# Patient Record
Sex: MALE | Race: WHITE | NOT HISPANIC OR LATINO
[De-identification: names, ages, dates, MRNs, and addresses within clinical notes are randomized per-mention and may not be internally consistent; named-entity substitution may affect disease eponyms.]

---

## 2017-09-18 ENCOUNTER — APPOINTMENT (OUTPATIENT)
Dept: INTERNAL MEDICINE | Facility: CLINIC | Age: 67
End: 2017-09-18

## 2017-12-09 ENCOUNTER — APPOINTMENT (OUTPATIENT)
Dept: INTERNAL MEDICINE | Facility: CLINIC | Age: 67
End: 2017-12-09
Payer: MEDICARE

## 2017-12-09 VITALS
HEART RATE: 90 BPM | HEIGHT: 72 IN | BODY MASS INDEX: 37.25 KG/M2 | SYSTOLIC BLOOD PRESSURE: 102 MMHG | RESPIRATION RATE: 18 BRPM | TEMPERATURE: 97.5 F | WEIGHT: 275 LBS | OXYGEN SATURATION: 91 % | DIASTOLIC BLOOD PRESSURE: 52 MMHG

## 2017-12-09 PROCEDURE — 99214 OFFICE O/P EST MOD 30 MIN: CPT | Mod: 25

## 2017-12-09 PROCEDURE — G0008: CPT

## 2017-12-09 PROCEDURE — 90662 IIV NO PRSV INCREASED AG IM: CPT

## 2017-12-09 PROCEDURE — 36415 COLL VENOUS BLD VENIPUNCTURE: CPT

## 2017-12-11 LAB
25(OH)D3 SERPL-MCNC: 20.1 NG/ML
ALBUMIN SERPL ELPH-MCNC: 3.7 G/DL
ALP BLD-CCNC: 128 U/L
ALT SERPL-CCNC: 19 U/L
ANION GAP SERPL CALC-SCNC: 19 MMOL/L
AST SERPL-CCNC: 16 U/L
BASOPHILS # BLD AUTO: 0.02 K/UL
BASOPHILS NFR BLD AUTO: 0.2 %
BILIRUB SERPL-MCNC: 0.3 MG/DL
BUN SERPL-MCNC: 18 MG/DL
CALCIUM SERPL-MCNC: 10.1 MG/DL
CHLORIDE SERPL-SCNC: 93 MMOL/L
CHOLEST SERPL-MCNC: 184 MG/DL
CHOLEST/HDLC SERPL: 3.8 RATIO
CO2 SERPL-SCNC: 27 MMOL/L
CREAT SERPL-MCNC: 1.01 MG/DL
EOSINOPHIL # BLD AUTO: 0.34 K/UL
EOSINOPHIL NFR BLD AUTO: 3.9
GLUCOSE SERPL-MCNC: 87 MG/DL
HBA1C MFR BLD HPLC: 5.2 %
HCT VFR BLD CALC: 39.6 %
HDLC SERPL-MCNC: 49 MG/DL
HGB BLD-MCNC: 12.8 G/DL
IMM GRANULOCYTES NFR BLD AUTO: 0.6 %
LDLC SERPL CALC-MCNC: 113 MG/DL
LYMPHOCYTES # BLD AUTO: 1.59 K/UL
LYMPHOCYTES NFR BLD AUTO: 18.2 %
MAN DIFF?: NORMAL
MCHC RBC-ENTMCNC: 27.9 PG
MCHC RBC-ENTMCNC: 32.3 GM/DL
MCV RBC AUTO: 86.5 FL
MONOCYTES # BLD AUTO: 0.69 K/UL
MONOCYTES NFR BLD AUTO: 7.9 %
NEUTROPHILS # BLD AUTO: 6.05 K/UL
NEUTROPHILS NFR BLD AUTO: 69.2 %
PLATELET # BLD AUTO: 356 K/UL
POTASSIUM SERPL-SCNC: 3.9 MMOL/L
PROT SERPL-MCNC: 7.9 G/DL
RBC # BLD: 4.58 M/UL
RBC # FLD: 13.5 %
SODIUM SERPL-SCNC: 139 MMOL/L
TRIGL SERPL-MCNC: 112 MG/DL
TSH SERPL-ACNC: 2.84 UIU/ML
WBC # FLD AUTO: 8.74 K/UL

## 2018-01-15 ENCOUNTER — RX RENEWAL (OUTPATIENT)
Age: 68
End: 2018-01-15

## 2018-01-31 ENCOUNTER — APPOINTMENT (OUTPATIENT)
Dept: INTERNAL MEDICINE | Facility: CLINIC | Age: 68
End: 2018-01-31
Payer: MEDICARE

## 2018-01-31 ENCOUNTER — RX RENEWAL (OUTPATIENT)
Age: 68
End: 2018-01-31

## 2018-01-31 VITALS
RESPIRATION RATE: 18 BRPM | WEIGHT: 272 LBS | HEART RATE: 70 BPM | SYSTOLIC BLOOD PRESSURE: 128 MMHG | OXYGEN SATURATION: 90 % | BODY MASS INDEX: 36.84 KG/M2 | DIASTOLIC BLOOD PRESSURE: 64 MMHG | HEIGHT: 72 IN

## 2018-01-31 PROCEDURE — 99214 OFFICE O/P EST MOD 30 MIN: CPT | Mod: 25

## 2018-01-31 PROCEDURE — 36415 COLL VENOUS BLD VENIPUNCTURE: CPT

## 2018-01-31 RX ORDER — LORATADINE 5 MG/5 ML
10 SOLUTION, ORAL ORAL
Refills: 0 | Status: DISCONTINUED | COMMUNITY
End: 2018-01-31

## 2018-01-31 RX ORDER — ACLIDINIUM BROMIDE 400 UG/1
400 POWDER, METERED RESPIRATORY (INHALATION)
Refills: 0 | Status: DISCONTINUED | COMMUNITY
End: 2017-12-09

## 2018-02-01 LAB
ANION GAP SERPL CALC-SCNC: 15 MMOL/L
BUN SERPL-MCNC: 20 MG/DL
CALCIUM SERPL-MCNC: 9.5 MG/DL
CHLORIDE SERPL-SCNC: 96 MMOL/L
CO2 SERPL-SCNC: 28 MMOL/L
CREAT SERPL-MCNC: 1.14 MG/DL
GLUCOSE SERPL-MCNC: 91 MG/DL
MAGNESIUM SERPL-MCNC: 1.7 MG/DL
POTASSIUM SERPL-SCNC: 4.5 MMOL/L
SODIUM SERPL-SCNC: 139 MMOL/L

## 2018-02-02 ENCOUNTER — RESULT REVIEW (OUTPATIENT)
Age: 68
End: 2018-02-02

## 2018-02-12 ENCOUNTER — MEDICATION RENEWAL (OUTPATIENT)
Age: 68
End: 2018-02-12

## 2018-02-14 ENCOUNTER — MEDICATION RENEWAL (OUTPATIENT)
Age: 68
End: 2018-02-14

## 2018-02-20 ENCOUNTER — MEDICATION RENEWAL (OUTPATIENT)
Age: 68
End: 2018-02-20

## 2018-02-23 ENCOUNTER — RX RENEWAL (OUTPATIENT)
Age: 68
End: 2018-02-23

## 2018-03-21 ENCOUNTER — MEDICATION RENEWAL (OUTPATIENT)
Age: 68
End: 2018-03-21

## 2018-03-26 ENCOUNTER — APPOINTMENT (OUTPATIENT)
Dept: INTERNAL MEDICINE | Facility: CLINIC | Age: 68
End: 2018-03-26
Payer: MEDICARE

## 2018-03-26 VITALS
SYSTOLIC BLOOD PRESSURE: 130 MMHG | OXYGEN SATURATION: 93 % | DIASTOLIC BLOOD PRESSURE: 74 MMHG | HEIGHT: 72 IN | RESPIRATION RATE: 20 BRPM | TEMPERATURE: 98.6 F | HEART RATE: 81 BPM

## 2018-03-26 PROCEDURE — 99214 OFFICE O/P EST MOD 30 MIN: CPT

## 2018-03-26 RX ORDER — CEPHALEXIN 250 MG/1
250 TABLET ORAL
Qty: 14 | Refills: 0 | Status: COMPLETED | COMMUNITY
Start: 2018-03-26 | End: 2018-04-02

## 2018-05-02 ENCOUNTER — RX RENEWAL (OUTPATIENT)
Age: 68
End: 2018-05-02

## 2018-05-14 ENCOUNTER — MEDICATION RENEWAL (OUTPATIENT)
Age: 68
End: 2018-05-14

## 2018-05-14 ENCOUNTER — RX RENEWAL (OUTPATIENT)
Age: 68
End: 2018-05-14

## 2018-05-24 ENCOUNTER — RX RENEWAL (OUTPATIENT)
Age: 68
End: 2018-05-24

## 2018-05-29 ENCOUNTER — MEDICATION RENEWAL (OUTPATIENT)
Age: 68
End: 2018-05-29

## 2018-06-04 ENCOUNTER — MEDICATION RENEWAL (OUTPATIENT)
Age: 68
End: 2018-06-04

## 2018-06-26 ENCOUNTER — APPOINTMENT (OUTPATIENT)
Dept: INTERNAL MEDICINE | Facility: CLINIC | Age: 68
End: 2018-06-26
Payer: MEDICARE

## 2018-06-26 VITALS
HEART RATE: 80 BPM | DIASTOLIC BLOOD PRESSURE: 78 MMHG | SYSTOLIC BLOOD PRESSURE: 132 MMHG | HEIGHT: 72 IN | BODY MASS INDEX: 37.25 KG/M2 | TEMPERATURE: 97.6 F | OXYGEN SATURATION: 94 % | WEIGHT: 275 LBS

## 2018-06-26 DIAGNOSIS — R21 RASH AND OTHER NONSPECIFIC SKIN ERUPTION: ICD-10-CM

## 2018-06-26 DIAGNOSIS — L74.0 MILIARIA RUBRA: ICD-10-CM

## 2018-06-26 DIAGNOSIS — L03.115 CELLULITIS OF RIGHT LOWER LIMB: ICD-10-CM

## 2018-06-26 PROCEDURE — 90670 PCV13 VACCINE IM: CPT

## 2018-06-26 PROCEDURE — 36415 COLL VENOUS BLD VENIPUNCTURE: CPT

## 2018-06-26 PROCEDURE — 99214 OFFICE O/P EST MOD 30 MIN: CPT | Mod: 25

## 2018-06-26 PROCEDURE — G0009: CPT

## 2018-06-26 RX ORDER — HALOBETASOL PROPIONATE 0.5 MG/G
0.05 OINTMENT TOPICAL
Qty: 50 | Refills: 0 | Status: COMPLETED | COMMUNITY
Start: 2018-05-02

## 2018-06-26 RX ORDER — CEPHALEXIN 250 MG/1
250 CAPSULE ORAL
Qty: 14 | Refills: 0 | Status: COMPLETED | COMMUNITY
Start: 2018-03-26

## 2018-06-26 RX ORDER — NYSTATIN 100000 [USP'U]/G
100000 CREAM TOPICAL TWICE DAILY
Qty: 2 | Refills: 3 | Status: DISCONTINUED | COMMUNITY
Start: 2017-12-09 | End: 2018-06-26

## 2018-06-26 RX ORDER — L. ACIDOPHILUS/L.BULGARICUS 1MM CELL
TABLET ORAL
Qty: 30 | Refills: 0 | Status: DISCONTINUED | COMMUNITY
Start: 2018-03-26 | End: 2018-06-26

## 2018-06-26 RX ORDER — NYSTATIN 100000 1/G
100000 POWDER TOPICAL
Qty: 1 | Refills: 2 | Status: DISCONTINUED | COMMUNITY
Start: 2018-01-31 | End: 2018-06-26

## 2018-06-26 RX ORDER — MUPIROCIN 20 MG/G
2 OINTMENT TOPICAL
Qty: 22 | Refills: 0 | Status: COMPLETED | COMMUNITY
Start: 2018-04-11

## 2018-06-26 RX ORDER — CLOTRIMAZOLE AND BETAMETHASONE DIPROPIONATE 10; .5 MG/G; MG/G
1-0.05 CREAM TOPICAL
Qty: 60 | Refills: 0 | Status: COMPLETED | COMMUNITY
Start: 2018-05-30

## 2018-06-26 RX ORDER — KETOCONAZOLE 20 MG/G
2 CREAM TOPICAL
Qty: 60 | Refills: 0 | Status: COMPLETED | COMMUNITY
Start: 2018-04-11

## 2018-06-26 NOTE — PLAN
[FreeTextEntry1] : 68 y.o. man with multiple medical comorbidities now clinically stable \par  - Continue with all current treatments.\par

## 2018-06-26 NOTE — REVIEW OF SYSTEMS
[Shortness Of Breath] : shortness of breath [Dyspnea on Exertion] : dyspnea on exertion [Joint Pain] : joint pain [Joint Stiffness] : joint stiffness [Joint Swelling] : joint swelling [Skin Rash] : skin rash [Negative] : Heme/Lymph [Muscle Pain] : no muscle pain [Muscle Weakness] : no muscle weakness [Back Pain] : no back pain [FreeTextEntry9] : Arthritis in the hands and wrist b/l, fingers "lock up" relieved with Aleve; Left decreased ROM [de-identified] : See HPI

## 2018-06-26 NOTE — HEALTH RISK ASSESSMENT
[No falls in past year] : Patient reported no falls in the past year [0] : 2) Feeling down, depressed, or hopeless: Not at all (0) [] : No [[id number]] : 0

## 2018-06-26 NOTE — HISTORY OF PRESENT ILLNESS
[Spouse] : spouse [FreeTextEntry1] : Follow up for chronic medical conditions  [de-identified] : Patient presents for follow up for his chronic medical conditions. He reports compliance with all prescribed medical therapy and dietary restrictions. No complaints at present.

## 2018-06-27 LAB
ANION GAP SERPL CALC-SCNC: 14 MMOL/L
BASOPHILS # BLD AUTO: 0.02 K/UL
BASOPHILS NFR BLD AUTO: 0.2 %
BUN SERPL-MCNC: 29 MG/DL
CALCIUM SERPL-MCNC: 9.7 MG/DL
CHLORIDE SERPL-SCNC: 95 MMOL/L
CO2 SERPL-SCNC: 29 MMOL/L
CREAT SERPL-MCNC: 1.14 MG/DL
EOSINOPHIL # BLD AUTO: 0.24 K/UL
EOSINOPHIL NFR BLD AUTO: 2.7 %
GLUCOSE SERPL-MCNC: 105 MG/DL
HCT VFR BLD CALC: 42.6 %
HGB BLD-MCNC: 12.9 G/DL
IMM GRANULOCYTES NFR BLD AUTO: 0.3 %
LYMPHOCYTES # BLD AUTO: 1.49 K/UL
LYMPHOCYTES NFR BLD AUTO: 16.7 %
MAN DIFF?: NORMAL
MCHC RBC-ENTMCNC: 26.8 PG
MCHC RBC-ENTMCNC: 30.3 GM/DL
MCV RBC AUTO: 88.6 FL
MONOCYTES # BLD AUTO: 0.54 K/UL
MONOCYTES NFR BLD AUTO: 6 %
NEUTROPHILS # BLD AUTO: 6.62 K/UL
NEUTROPHILS NFR BLD AUTO: 74.1 %
PLATELET # BLD AUTO: 303 K/UL
POTASSIUM SERPL-SCNC: 4.8 MMOL/L
RBC # BLD: 4.81 M/UL
RBC # FLD: 14.5 %
SODIUM SERPL-SCNC: 138 MMOL/L
WBC # FLD AUTO: 8.94 K/UL

## 2018-07-30 ENCOUNTER — MEDICATION RENEWAL (OUTPATIENT)
Age: 68
End: 2018-07-30

## 2018-07-31 ENCOUNTER — MEDICATION RENEWAL (OUTPATIENT)
Age: 68
End: 2018-07-31

## 2018-08-02 ENCOUNTER — RX RENEWAL (OUTPATIENT)
Age: 68
End: 2018-08-02

## 2018-08-15 ENCOUNTER — MEDICATION RENEWAL (OUTPATIENT)
Age: 68
End: 2018-08-15

## 2018-08-16 ENCOUNTER — RX RENEWAL (OUTPATIENT)
Age: 68
End: 2018-08-16

## 2018-09-05 ENCOUNTER — RX RENEWAL (OUTPATIENT)
Age: 68
End: 2018-09-05

## 2018-09-11 ENCOUNTER — RX RENEWAL (OUTPATIENT)
Age: 68
End: 2018-09-11

## 2018-09-12 ENCOUNTER — MEDICATION RENEWAL (OUTPATIENT)
Age: 68
End: 2018-09-12

## 2018-09-12 RX ORDER — MOMETASONE FUROATE 1 MG/G
0.1 CREAM TOPICAL
Qty: 1 | Refills: 0 | Status: ACTIVE | COMMUNITY
Start: 2017-12-09 | End: 1900-01-01

## 2018-09-26 ENCOUNTER — APPOINTMENT (OUTPATIENT)
Dept: INTERNAL MEDICINE | Facility: CLINIC | Age: 68
End: 2018-09-26
Payer: MEDICARE

## 2018-09-26 VITALS
SYSTOLIC BLOOD PRESSURE: 128 MMHG | HEART RATE: 94 BPM | TEMPERATURE: 97.6 F | BODY MASS INDEX: 39.14 KG/M2 | HEIGHT: 72 IN | DIASTOLIC BLOOD PRESSURE: 70 MMHG | WEIGHT: 289 LBS | RESPIRATION RATE: 18 BRPM | OXYGEN SATURATION: 93 %

## 2018-09-26 DIAGNOSIS — J44.9 CHRONIC OBSTRUCTIVE PULMONARY DISEASE, UNSPECIFIED: ICD-10-CM

## 2018-09-26 PROCEDURE — G0008: CPT | Mod: 59

## 2018-09-26 PROCEDURE — 90662 IIV NO PRSV INCREASED AG IM: CPT

## 2018-09-26 PROCEDURE — 90471 IMMUNIZATION ADMIN: CPT | Mod: GY

## 2018-09-26 PROCEDURE — 36415 COLL VENOUS BLD VENIPUNCTURE: CPT

## 2018-09-26 PROCEDURE — 99215 OFFICE O/P EST HI 40 MIN: CPT | Mod: 25

## 2018-09-26 PROCEDURE — 90715 TDAP VACCINE 7 YRS/> IM: CPT | Mod: GY

## 2018-09-26 NOTE — COUNSELING
[Weight management counseling provided] : Weight management [Target Wt Loss Goal ___] : Target weight loss goal [unfilled] lbs [Low Fat Diet] : Low fat diet [Decrease Portions] : Decrease food portions [Low Salt Diet] : Low salt diet [Walking] : Walking [None] : None [Good understanding] : Patient has a good understanding of lifestyle changes and the steps needed to achieve self management goals

## 2018-09-26 NOTE — HISTORY OF PRESENT ILLNESS
[FreeTextEntry1] : Follow up for chronic medical conditions  [de-identified] : Patient presents for follow up for his chronic medical conditions. He reports compliance with all prescribed medical therapy and poor compliance with dietary restrictions. Patient reports decrease physical activity.

## 2018-09-26 NOTE — REVIEW OF SYSTEMS
[Shortness Of Breath] : shortness of breath [Dyspnea on Exertion] : dyspnea on exertion [Joint Pain] : joint pain [Joint Stiffness] : joint stiffness [Joint Swelling] : joint swelling [Skin Rash] : skin rash [Negative] : Heme/Lymph [Muscle Pain] : no muscle pain [Muscle Weakness] : no muscle weakness [Back Pain] : no back pain [FreeTextEntry9] : Arthritis in the hands and wrist b/l, fingers "lock up" relieved with Aleve; Left decreased ROM [de-identified] : See HPI

## 2018-09-28 LAB
ANION GAP SERPL CALC-SCNC: 16 MMOL/L
BUN SERPL-MCNC: 18 MG/DL
CALCIUM SERPL-MCNC: 9.3 MG/DL
CHLORIDE SERPL-SCNC: 98 MMOL/L
CO2 SERPL-SCNC: 28 MMOL/L
CREAT SERPL-MCNC: 1.01 MG/DL
GLUCOSE SERPL-MCNC: 93 MG/DL
POTASSIUM SERPL-SCNC: 4.4 MMOL/L
SODIUM SERPL-SCNC: 142 MMOL/L

## 2018-10-29 ENCOUNTER — RX RENEWAL (OUTPATIENT)
Age: 68
End: 2018-10-29

## 2018-11-19 ENCOUNTER — RX RENEWAL (OUTPATIENT)
Age: 68
End: 2018-11-19

## 2018-11-20 ENCOUNTER — RX RENEWAL (OUTPATIENT)
Age: 68
End: 2018-11-20

## 2018-12-03 ENCOUNTER — RX RENEWAL (OUTPATIENT)
Age: 68
End: 2018-12-03

## 2018-12-11 ENCOUNTER — RX RENEWAL (OUTPATIENT)
Age: 68
End: 2018-12-11

## 2019-01-09 ENCOUNTER — APPOINTMENT (OUTPATIENT)
Dept: INTERNAL MEDICINE | Facility: CLINIC | Age: 69
End: 2019-01-09
Payer: MEDICARE

## 2019-01-09 VITALS
SYSTOLIC BLOOD PRESSURE: 110 MMHG | RESPIRATION RATE: 18 BRPM | HEIGHT: 72 IN | OXYGEN SATURATION: 92 % | HEART RATE: 86 BPM | TEMPERATURE: 97.8 F | DIASTOLIC BLOOD PRESSURE: 74 MMHG

## 2019-01-09 DIAGNOSIS — Z72.3 LACK OF PHYSICAL EXERCISE: ICD-10-CM

## 2019-01-09 PROCEDURE — 36415 COLL VENOUS BLD VENIPUNCTURE: CPT

## 2019-01-09 PROCEDURE — 99214 OFFICE O/P EST MOD 30 MIN: CPT | Mod: 25

## 2019-01-09 RX ORDER — ESOMEPRAZOLE MAGNESIUM 20 MG/1
20 CAPSULE, DELAYED RELEASE ORAL
Qty: 30 | Refills: 0 | Status: DISCONTINUED | COMMUNITY
Start: 2017-11-20 | End: 2019-01-09

## 2019-01-09 NOTE — PHYSICAL EXAM
[No Acute Distress] : no acute distress [Well Nourished] : well nourished [Well Developed] : well developed [Well-Appearing] : well-appearing [Normal Sclera/Conjunctiva] : normal sclera/conjunctiva [EOMI] : extraocular movements intact [Normal Outer Ear/Nose] : the outer ears and nose were normal in appearance [Normal Oropharynx] : the oropharynx was normal [No JVD] : no jugular venous distention [Supple] : supple [No Lymphadenopathy] : no lymphadenopathy [Thyroid Normal, No Nodules] : the thyroid was normal and there were no nodules present [No Respiratory Distress] : no respiratory distress  [Clear to Auscultation] : lungs were clear to auscultation bilaterally [No Accessory Muscle Use] : no accessory muscle use [Normal Rate] : normal rate  [Regular Rhythm] : with a regular rhythm [Normal S1, S2] : normal S1 and S2 [No Murmur] : no murmur heard [No Carotid Bruits] : no carotid bruits [No Abdominal Bruit] : a ~M bruit was not heard ~T in the abdomen [No Varicosities] : no varicosities [Pedal Pulses Present] : the pedal pulses are present [No Extremity Clubbing/Cyanosis] : no extremity clubbing/cyanosis [No Palpable Aorta] : no palpable aorta [Soft] : abdomen soft [Non Tender] : non-tender [No Masses] : no abdominal mass palpated [No HSM] : no HSM [Normal Bowel Sounds] : normal bowel sounds [Normal Supraclavicular Nodes] : no supraclavicular lymphadenopathy [Normal Posterior Cervical Nodes] : no posterior cervical lymphadenopathy [Normal Anterior Cervical Nodes] : no anterior cervical lymphadenopathy [No CVA Tenderness] : no CVA  tenderness [No Spinal Tenderness] : no spinal tenderness [No Joint Swelling] : no joint swelling [Grossly Normal Strength/Tone] : grossly normal strength/tone [No Rash] : no rash [Coordination Grossly Intact] : coordination grossly intact [No Focal Deficits] : no focal deficits [Speech Grossly Normal] : speech grossly normal [Memory Grossly Normal] : memory grossly normal [Normal Affect] : the affect was normal [Alert and Oriented x3] : oriented to person, place, and time [Normal Mood] : the mood was normal [Normal Insight/Judgement] : insight and judgment were intact [de-identified] : Obese [de-identified] : Supplemental O2 in place via nasal cannula [de-identified] : Patient on portable oxygen;  [de-identified] : Abdomen distended  [de-identified] : Generalized rash over left upper extremity. Right foot erythema between 4-5th toes. warmth.

## 2019-01-09 NOTE — HISTORY OF PRESENT ILLNESS
[FreeTextEntry1] : Follow up for chronic medical conditions  [de-identified] : Patient presents for follow up for his chronic medical conditions. He reports compliance with all prescribed medical therapy and dietary restrictions. He states that he is still attempting to lose weight. No complaints at present.

## 2019-01-09 NOTE — HEALTH RISK ASSESSMENT
[No falls in past year] : Patient reported no falls in the past year [0] : 2) Feeling down, depressed, or hopeless: Not at all (0) [] : No [[id number]] : 0

## 2019-01-16 LAB
ALBUMIN SERPL ELPH-MCNC: 4.4 G/DL
ALP BLD-CCNC: 136 U/L
ALT SERPL-CCNC: 11 U/L
ANION GAP SERPL CALC-SCNC: 14 MMOL/L
AST SERPL-CCNC: 13 U/L
BASOPHILS # BLD AUTO: 0.02 K/UL
BASOPHILS NFR BLD AUTO: 0.2 %
BILIRUB SERPL-MCNC: 0.2 MG/DL
BUN SERPL-MCNC: 24 MG/DL
CALCIUM SERPL-MCNC: 9.7 MG/DL
CHLORIDE SERPL-SCNC: 96 MMOL/L
CHOLEST SERPL-MCNC: 161 MG/DL
CHOLEST/HDLC SERPL: 2.3 RATIO
CO2 SERPL-SCNC: 30 MMOL/L
CREAT SERPL-MCNC: 0.99 MG/DL
EOSINOPHIL # BLD AUTO: 0.34 K/UL
EOSINOPHIL NFR BLD AUTO: 3.7 %
GLUCOSE SERPL-MCNC: 88 MG/DL
HCT VFR BLD CALC: 42 %
HDLC SERPL-MCNC: 70 MG/DL
HGB BLD-MCNC: 12.9 G/DL
IMM GRANULOCYTES NFR BLD AUTO: 0.4 %
LDLC SERPL CALC-MCNC: 76 MG/DL
LYMPHOCYTES # BLD AUTO: 1.98 K/UL
LYMPHOCYTES NFR BLD AUTO: 21.8 %
MAN DIFF?: NORMAL
MCHC RBC-ENTMCNC: 26.3 PG
MCHC RBC-ENTMCNC: 30.7 GM/DL
MCV RBC AUTO: 85.5 FL
MONOCYTES # BLD AUTO: 0.75 K/UL
MONOCYTES NFR BLD AUTO: 8.3 %
NEUTROPHILS # BLD AUTO: 5.95 K/UL
NEUTROPHILS NFR BLD AUTO: 65.6 %
PLATELET # BLD AUTO: 336 K/UL
POTASSIUM SERPL-SCNC: 5 MMOL/L
PROT SERPL-MCNC: 7.9 G/DL
RBC # BLD: 4.91 M/UL
RBC # FLD: 14.6 %
SODIUM SERPL-SCNC: 140 MMOL/L
TRIGL SERPL-MCNC: 73 MG/DL
WBC # FLD AUTO: 9.08 K/UL

## 2019-02-07 ENCOUNTER — MEDICATION RENEWAL (OUTPATIENT)
Age: 69
End: 2019-02-07

## 2019-04-09 ENCOUNTER — MEDICATION RENEWAL (OUTPATIENT)
Age: 69
End: 2019-04-09

## 2019-04-23 ENCOUNTER — APPOINTMENT (OUTPATIENT)
Dept: INTERNAL MEDICINE | Facility: CLINIC | Age: 69
End: 2019-04-23
Payer: MEDICARE

## 2019-04-23 VITALS
BODY MASS INDEX: 39.55 KG/M2 | HEART RATE: 84 BPM | SYSTOLIC BLOOD PRESSURE: 124 MMHG | RESPIRATION RATE: 18 BRPM | TEMPERATURE: 97.5 F | DIASTOLIC BLOOD PRESSURE: 70 MMHG | HEIGHT: 72 IN | OXYGEN SATURATION: 91 % | WEIGHT: 292 LBS

## 2019-04-23 VITALS — WEIGHT: 289 LBS | BODY MASS INDEX: 39.2 KG/M2

## 2019-04-23 PROCEDURE — 99214 OFFICE O/P EST MOD 30 MIN: CPT | Mod: 25

## 2019-04-23 PROCEDURE — 36415 COLL VENOUS BLD VENIPUNCTURE: CPT

## 2019-04-23 NOTE — HISTORY OF PRESENT ILLNESS
[Spouse] : spouse [FreeTextEntry1] : Follow up for chronic medical conditions  [de-identified] : Patient presents for follow up for his chronic medical conditions. He reports compliance with all prescribed medical therapy and poor compliance with dietary restrictions. No complaints at present.

## 2019-04-23 NOTE — PHYSICAL EXAM
[No Acute Distress] : no acute distress [Well Nourished] : well nourished [Well Developed] : well developed [Well-Appearing] : well-appearing [Normal Sclera/Conjunctiva] : normal sclera/conjunctiva [EOMI] : extraocular movements intact [Normal Outer Ear/Nose] : the outer ears and nose were normal in appearance [Normal Oropharynx] : the oropharynx was normal [No JVD] : no jugular venous distention [No Lymphadenopathy] : no lymphadenopathy [Supple] : supple [Thyroid Normal, No Nodules] : the thyroid was normal and there were no nodules present [No Respiratory Distress] : no respiratory distress  [Clear to Auscultation] : lungs were clear to auscultation bilaterally [No Accessory Muscle Use] : no accessory muscle use [Normal Rate] : normal rate  [Regular Rhythm] : with a regular rhythm [Normal S1, S2] : normal S1 and S2 [No Murmur] : no murmur heard [No Abdominal Bruit] : a ~M bruit was not heard ~T in the abdomen [No Carotid Bruits] : no carotid bruits [No Varicosities] : no varicosities [Pedal Pulses Present] : the pedal pulses are present [No Palpable Aorta] : no palpable aorta [No Extremity Clubbing/Cyanosis] : no extremity clubbing/cyanosis [Soft] : abdomen soft [Non Tender] : non-tender [No Masses] : no abdominal mass palpated [No HSM] : no HSM [Normal Supraclavicular Nodes] : no supraclavicular lymphadenopathy [Normal Bowel Sounds] : normal bowel sounds [Normal Posterior Cervical Nodes] : no posterior cervical lymphadenopathy [Normal Anterior Cervical Nodes] : no anterior cervical lymphadenopathy [No Joint Swelling] : no joint swelling [No CVA Tenderness] : no CVA  tenderness [No Spinal Tenderness] : no spinal tenderness [Grossly Normal Strength/Tone] : grossly normal strength/tone [No Rash] : no rash [Coordination Grossly Intact] : coordination grossly intact [No Focal Deficits] : no focal deficits [Speech Grossly Normal] : speech grossly normal [Memory Grossly Normal] : memory grossly normal [Normal Affect] : the affect was normal [Alert and Oriented x3] : oriented to person, place, and time [Normal Mood] : the mood was normal [Normal Insight/Judgement] : insight and judgment were intact [de-identified] : Obese [de-identified] : Supplemental O2 in place via nasal cannula [de-identified] : Abdomen distended  [de-identified] : Patient on portable oxygen;

## 2019-04-23 NOTE — HEALTH RISK ASSESSMENT
[No falls in past year] : Patient reported no falls in the past year [0] : 2) Feeling down, depressed, or hopeless: Not at all (0) [] : No [[id number]] : 0

## 2019-04-23 NOTE — COUNSELING
[Weight management counseling provided] : Weight management [Healthy eating counseling provided] : healthy eating [Activity counseling provided] : activity [Decrease Portions] : Decrease food portions [Target Wt Loss Goal ___] : Target weight loss goal [unfilled] lbs [___ min/wk activity recommended] : [unfilled] min/wk activity recommended [None] : None [Walking] : Walking [Good understanding] : Patient has a good understanding of lifestyle changes and the steps needed to achieve self management goals

## 2019-04-26 LAB
ALBUMIN SERPL ELPH-MCNC: 4.4 G/DL
ALP BLD-CCNC: 137 U/L
ALT SERPL-CCNC: 19 U/L
ANION GAP SERPL CALC-SCNC: 22 MMOL/L
AST SERPL-CCNC: 16 U/L
BILIRUB SERPL-MCNC: 0.2 MG/DL
BUN SERPL-MCNC: 21 MG/DL
CALCIUM SERPL-MCNC: 10 MG/DL
CHLORIDE SERPL-SCNC: 93 MMOL/L
CHOLEST SERPL-MCNC: 149 MG/DL
CHOLEST/HDLC SERPL: 2.1 RATIO
CO2 SERPL-SCNC: 27 MMOL/L
CREAT SERPL-MCNC: 1.04 MG/DL
GLUCOSE SERPL-MCNC: 103 MG/DL
HDLC SERPL-MCNC: 71 MG/DL
LDLC SERPL CALC-MCNC: 59 MG/DL
POTASSIUM SERPL-SCNC: 4.8 MMOL/L
PROT SERPL-MCNC: 7.9 G/DL
SODIUM SERPL-SCNC: 142 MMOL/L
TRIGL SERPL-MCNC: 93 MG/DL

## 2019-04-30 ENCOUNTER — RX RENEWAL (OUTPATIENT)
Age: 69
End: 2019-04-30

## 2019-06-03 ENCOUNTER — RX RENEWAL (OUTPATIENT)
Age: 69
End: 2019-06-03

## 2019-06-07 ENCOUNTER — MEDICATION RENEWAL (OUTPATIENT)
Age: 69
End: 2019-06-07

## 2019-06-21 ENCOUNTER — MEDICATION RENEWAL (OUTPATIENT)
Age: 69
End: 2019-06-21

## 2019-06-24 ENCOUNTER — RX RENEWAL (OUTPATIENT)
Age: 69
End: 2019-06-24

## 2019-06-26 ENCOUNTER — APPOINTMENT (OUTPATIENT)
Dept: INTERNAL MEDICINE | Facility: CLINIC | Age: 69
End: 2019-06-26
Payer: MEDICARE

## 2019-06-26 ENCOUNTER — OTHER (OUTPATIENT)
Age: 69
End: 2019-06-26

## 2019-06-26 VITALS — OXYGEN SATURATION: 93 %

## 2019-06-26 VITALS — DIASTOLIC BLOOD PRESSURE: 72 MMHG | OXYGEN SATURATION: 93 % | SYSTOLIC BLOOD PRESSURE: 124 MMHG

## 2019-06-26 VITALS
TEMPERATURE: 97.7 F | OXYGEN SATURATION: 86 % | HEIGHT: 72 IN | WEIGHT: 292 LBS | BODY MASS INDEX: 39.55 KG/M2 | HEART RATE: 84 BPM

## 2019-06-26 DIAGNOSIS — R07.89 OTHER CHEST PAIN: ICD-10-CM

## 2019-06-26 PROCEDURE — 99214 OFFICE O/P EST MOD 30 MIN: CPT | Mod: 25

## 2019-06-26 PROCEDURE — 93000 ELECTROCARDIOGRAM COMPLETE: CPT

## 2019-06-26 RX ORDER — AMLODIPINE BESYLATE 10 MG/1
10 TABLET ORAL
Qty: 90 | Refills: 1 | Status: DISCONTINUED | COMMUNITY
Start: 2018-09-05 | End: 2019-06-26

## 2019-06-26 NOTE — PHYSICAL EXAM
[No Acute Distress] : no acute distress [Well Nourished] : well nourished [Well Developed] : well developed [Well-Appearing] : well-appearing [Normal Sclera/Conjunctiva] : normal sclera/conjunctiva [EOMI] : extraocular movements intact [Normal Outer Ear/Nose] : the outer ears and nose were normal in appearance [Normal Oropharynx] : the oropharynx was normal [No JVD] : no jugular venous distention [Supple] : supple [No Lymphadenopathy] : no lymphadenopathy [Thyroid Normal, No Nodules] : the thyroid was normal and there were no nodules present [No Respiratory Distress] : no respiratory distress  [Clear to Auscultation] : lungs were clear to auscultation bilaterally [No Accessory Muscle Use] : no accessory muscle use [Normal Rate] : normal rate  [Regular Rhythm] : with a regular rhythm [Normal S1, S2] : normal S1 and S2 [No Murmur] : no murmur heard [No Carotid Bruits] : no carotid bruits [No Abdominal Bruit] : a ~M bruit was not heard ~T in the abdomen [No Varicosities] : no varicosities [Pedal Pulses Present] : the pedal pulses are present [No Extremity Clubbing/Cyanosis] : no extremity clubbing/cyanosis [No Palpable Aorta] : no palpable aorta [Soft] : abdomen soft [Non Tender] : non-tender [No Masses] : no abdominal mass palpated [No HSM] : no HSM [Normal Bowel Sounds] : normal bowel sounds [Normal Supraclavicular Nodes] : no supraclavicular lymphadenopathy [Normal Posterior Cervical Nodes] : no posterior cervical lymphadenopathy [Normal Anterior Cervical Nodes] : no anterior cervical lymphadenopathy [No CVA Tenderness] : no CVA  tenderness [No Spinal Tenderness] : no spinal tenderness [No Joint Swelling] : no joint swelling [Grossly Normal Strength/Tone] : grossly normal strength/tone [No Rash] : no rash [Coordination Grossly Intact] : coordination grossly intact [No Focal Deficits] : no focal deficits [Speech Grossly Normal] : speech grossly normal [Memory Grossly Normal] : memory grossly normal [Normal Affect] : the affect was normal [Alert and Oriented x3] : oriented to person, place, and time [Normal Mood] : the mood was normal [Normal Insight/Judgement] : insight and judgment were intact [de-identified] : Obese [de-identified] : Supplemental O2 in place via nasal cannula [de-identified] : Patient on portable oxygen;  [de-identified] : Abdomen distended

## 2019-06-26 NOTE — HISTORY OF PRESENT ILLNESS
[FreeTextEntry8] : Patient presents for evaluation for substernal chest pain of 4-5 days in duration. Pain is 4/10, aching in quality, non-radiating, no discernible alleviating factors, aggravated with coffee. Patient states that he had similar symptoms 6-8 months ago for 1 day. Today, he reports being asymptomatic. Pain is non-reproducible. No other complaints at present.

## 2019-06-26 NOTE — HEALTH RISK ASSESSMENT
[No falls in past year] : Patient reported no falls in the past year [No] : No [0] : 1) Little interest or pleasure doing things: Not at all (0) [] : No [[id number]] : 0

## 2019-06-26 NOTE — PLAN
[FreeTextEntry1] : - Will obtain lab work (CMP, CBC, Mag, TSH, T3 and T4)\par - Cardiology evaluation \par CHADSVASC score of 2

## 2019-07-02 ENCOUNTER — MOBILE ON CALL (OUTPATIENT)
Age: 69
End: 2019-07-02

## 2019-07-05 ENCOUNTER — INPATIENT (INPATIENT)
Facility: HOSPITAL | Age: 69
LOS: 11 days | Discharge: HOME | DRG: 225 | End: 2019-07-17
Attending: STUDENT IN AN ORGANIZED HEALTH CARE EDUCATION/TRAINING PROGRAM | Admitting: INTERNAL MEDICINE
Payer: MEDICARE

## 2019-07-05 VITALS
DIASTOLIC BLOOD PRESSURE: 79 MMHG | SYSTOLIC BLOOD PRESSURE: 125 MMHG | TEMPERATURE: 98 F | HEART RATE: 92 BPM | WEIGHT: 289.25 LBS | HEIGHT: 72 IN | OXYGEN SATURATION: 97 % | RESPIRATION RATE: 20 BRPM

## 2019-07-05 DIAGNOSIS — I10 ESSENTIAL (PRIMARY) HYPERTENSION: ICD-10-CM

## 2019-07-05 DIAGNOSIS — I49.9 CARDIAC ARRHYTHMIA, UNSPECIFIED: ICD-10-CM

## 2019-07-05 DIAGNOSIS — J44.9 CHRONIC OBSTRUCTIVE PULMONARY DISEASE, UNSPECIFIED: ICD-10-CM

## 2019-07-05 DIAGNOSIS — R55 SYNCOPE AND COLLAPSE: ICD-10-CM

## 2019-07-05 LAB
ALBUMIN SERPL ELPH-MCNC: 3.9 G/DL — SIGNIFICANT CHANGE UP (ref 3.3–5)
ALP SERPL-CCNC: 125 U/L — HIGH (ref 40–120)
ALT FLD-CCNC: 61 U/L — HIGH (ref 10–45)
ANION GAP SERPL CALC-SCNC: 14 MMOL/L — SIGNIFICANT CHANGE UP (ref 5–17)
APTT BLD: 26.1 SEC — LOW (ref 27.5–36.3)
AST SERPL-CCNC: 34 U/L — SIGNIFICANT CHANGE UP (ref 10–40)
BILIRUB SERPL-MCNC: 0.3 MG/DL — SIGNIFICANT CHANGE UP (ref 0.2–1.2)
BLD GP AB SCN SERPL QL: NEGATIVE — SIGNIFICANT CHANGE UP
BUN SERPL-MCNC: 31 MG/DL — HIGH (ref 7–23)
CALCIUM SERPL-MCNC: 9.2 MG/DL — SIGNIFICANT CHANGE UP (ref 8.4–10.5)
CHLORIDE SERPL-SCNC: 95 MMOL/L — LOW (ref 96–108)
CO2 SERPL-SCNC: 29 MMOL/L — SIGNIFICANT CHANGE UP (ref 22–31)
CREAT SERPL-MCNC: 0.93 MG/DL — SIGNIFICANT CHANGE UP (ref 0.5–1.3)
GLUCOSE SERPL-MCNC: 81 MG/DL — SIGNIFICANT CHANGE UP (ref 70–99)
HBA1C BLD-MCNC: 5.6 % — SIGNIFICANT CHANGE UP (ref 4–5.6)
HCT VFR BLD CALC: 44.3 % — SIGNIFICANT CHANGE UP (ref 39–50)
HGB BLD-MCNC: 13.9 G/DL — SIGNIFICANT CHANGE UP (ref 13–17)
INR BLD: 1.22 RATIO — HIGH (ref 0.88–1.16)
MAGNESIUM SERPL-MCNC: 2.2 MG/DL — SIGNIFICANT CHANGE UP (ref 1.6–2.6)
MCHC RBC-ENTMCNC: 26 PG — LOW (ref 27–34)
MCHC RBC-ENTMCNC: 31.4 GM/DL — LOW (ref 32–36)
MCV RBC AUTO: 82.7 FL — SIGNIFICANT CHANGE UP (ref 80–100)
PHOSPHATE SERPL-MCNC: 2.3 MG/DL — LOW (ref 2.5–4.5)
PLATELET # BLD AUTO: 345 K/UL — SIGNIFICANT CHANGE UP (ref 150–400)
POTASSIUM SERPL-MCNC: 4 MMOL/L — SIGNIFICANT CHANGE UP (ref 3.5–5.3)
POTASSIUM SERPL-SCNC: 4 MMOL/L — SIGNIFICANT CHANGE UP (ref 3.5–5.3)
PROT SERPL-MCNC: 8.3 G/DL — SIGNIFICANT CHANGE UP (ref 6–8.3)
PROTHROM AB SERPL-ACNC: 14 SEC — HIGH (ref 10–12.9)
RBC # BLD: 5.36 M/UL — SIGNIFICANT CHANGE UP (ref 4.2–5.8)
RBC # FLD: 13.5 % — SIGNIFICANT CHANGE UP (ref 10.3–14.5)
RH IG SCN BLD-IMP: POSITIVE — SIGNIFICANT CHANGE UP
SODIUM SERPL-SCNC: 138 MMOL/L — SIGNIFICANT CHANGE UP (ref 135–145)
TSH SERPL-MCNC: 2.26 UIU/ML — SIGNIFICANT CHANGE UP (ref 0.27–4.2)
WBC # BLD: 12.9 K/UL — HIGH (ref 3.8–10.5)
WBC # FLD AUTO: 12.9 K/UL — HIGH (ref 3.8–10.5)

## 2019-07-05 PROCEDURE — 99233 SBSQ HOSP IP/OBS HIGH 50: CPT

## 2019-07-05 RX ORDER — AMLODIPINE BESYLATE 2.5 MG/1
10 TABLET ORAL DAILY
Refills: 0 | Status: DISCONTINUED | OUTPATIENT
Start: 2019-07-05 | End: 2019-07-07

## 2019-07-05 RX ORDER — TAMSULOSIN HYDROCHLORIDE 0.4 MG/1
0.8 CAPSULE ORAL AT BEDTIME
Refills: 0 | Status: DISCONTINUED | OUTPATIENT
Start: 2019-07-05 | End: 2019-07-17

## 2019-07-05 RX ORDER — APIXABAN 2.5 MG/1
5 TABLET, FILM COATED ORAL EVERY 12 HOURS
Refills: 0 | Status: DISCONTINUED | OUTPATIENT
Start: 2019-07-05 | End: 2019-07-07

## 2019-07-05 RX ORDER — BUDESONIDE AND FORMOTEROL FUMARATE DIHYDRATE 160; 4.5 UG/1; UG/1
2 AEROSOL RESPIRATORY (INHALATION)
Refills: 0 | Status: DISCONTINUED | OUTPATIENT
Start: 2019-07-05 | End: 2019-07-17

## 2019-07-05 RX ORDER — HEPARIN SODIUM 5000 [USP'U]/ML
5000 INJECTION INTRAVENOUS; SUBCUTANEOUS EVERY 6 HOURS
Refills: 0 | Status: DISCONTINUED | OUTPATIENT
Start: 2019-07-05 | End: 2019-07-05

## 2019-07-05 RX ORDER — HEPARIN SODIUM 5000 [USP'U]/ML
10000 INJECTION INTRAVENOUS; SUBCUTANEOUS ONCE
Refills: 0 | Status: DISCONTINUED | OUTPATIENT
Start: 2019-07-05 | End: 2019-07-05

## 2019-07-05 RX ORDER — PREGABALIN 225 MG/1
1000 CAPSULE ORAL DAILY
Refills: 0 | Status: DISCONTINUED | OUTPATIENT
Start: 2019-07-05 | End: 2019-07-17

## 2019-07-05 RX ORDER — PANTOPRAZOLE SODIUM 20 MG/1
40 TABLET, DELAYED RELEASE ORAL
Refills: 0 | Status: DISCONTINUED | OUTPATIENT
Start: 2019-07-05 | End: 2019-07-17

## 2019-07-05 RX ORDER — METOPROLOL TARTRATE 50 MG
25 TABLET ORAL
Refills: 0 | Status: DISCONTINUED | OUTPATIENT
Start: 2019-07-05 | End: 2019-07-10

## 2019-07-05 RX ORDER — TIOTROPIUM BROMIDE 18 UG/1
1 CAPSULE ORAL; RESPIRATORY (INHALATION) DAILY
Refills: 0 | Status: DISCONTINUED | OUTPATIENT
Start: 2019-07-05 | End: 2019-07-17

## 2019-07-05 RX ORDER — FINASTERIDE 5 MG/1
1 TABLET, FILM COATED ORAL
Qty: 0 | Refills: 0 | DISCHARGE

## 2019-07-05 RX ORDER — APIXABAN 2.5 MG/1
5 TABLET, FILM COATED ORAL EVERY 12 HOURS
Refills: 0 | Status: DISCONTINUED | OUTPATIENT
Start: 2019-07-05 | End: 2019-07-05

## 2019-07-05 RX ORDER — ALBUTEROL 90 UG/1
2 AEROSOL, METERED ORAL EVERY 6 HOURS
Refills: 0 | Status: DISCONTINUED | OUTPATIENT
Start: 2019-07-05 | End: 2019-07-17

## 2019-07-05 RX ORDER — FUROSEMIDE 40 MG
40 TABLET ORAL DAILY
Refills: 0 | Status: DISCONTINUED | OUTPATIENT
Start: 2019-07-05 | End: 2019-07-17

## 2019-07-05 RX ORDER — ATORVASTATIN CALCIUM 80 MG/1
10 TABLET, FILM COATED ORAL AT BEDTIME
Refills: 0 | Status: DISCONTINUED | OUTPATIENT
Start: 2019-07-05 | End: 2019-07-17

## 2019-07-05 RX ORDER — FINASTERIDE 5 MG/1
5 TABLET, FILM COATED ORAL DAILY
Refills: 0 | Status: DISCONTINUED | OUTPATIENT
Start: 2019-07-05 | End: 2019-07-17

## 2019-07-05 RX ORDER — HEPARIN SODIUM 5000 [USP'U]/ML
INJECTION INTRAVENOUS; SUBCUTANEOUS
Qty: 25000 | Refills: 0 | Status: DISCONTINUED | OUTPATIENT
Start: 2019-07-05 | End: 2019-07-05

## 2019-07-05 RX ADMIN — BUDESONIDE AND FORMOTEROL FUMARATE DIHYDRATE 2 PUFF(S): 160; 4.5 AEROSOL RESPIRATORY (INHALATION) at 21:34

## 2019-07-05 RX ADMIN — APIXABAN 5 MILLIGRAM(S): 2.5 TABLET, FILM COATED ORAL at 21:04

## 2019-07-05 RX ADMIN — TAMSULOSIN HYDROCHLORIDE 0.8 MILLIGRAM(S): 0.4 CAPSULE ORAL at 21:04

## 2019-07-05 RX ADMIN — Medication 25 MILLIGRAM(S): at 18:07

## 2019-07-05 RX ADMIN — ATORVASTATIN CALCIUM 10 MILLIGRAM(S): 80 TABLET, FILM COATED ORAL at 21:04

## 2019-07-05 RX ADMIN — Medication 62.5 MILLIMOLE(S): at 20:14

## 2019-07-05 NOTE — H&P ADULT - ASSESSMENT
70 y/o M w/ pmhx of HTN, HLD, MORRO (on CPAP), recently diagnosed afib (on cardizem and Eliquis) and Gout presented to [facility] after having one syncopal episode at home on 6/29.  At [facility], Trops negative and pt had ? vtach vs SVT episode (No EKG available) and now transferred to [facility] for possible EPS.

## 2019-07-05 NOTE — H&P ADULT - PROBLEM SELECTOR PLAN 2
Hx of COPD and MORRO  mild expiratory wheezing R>L  Continue home neb tx  - Cont. Advair, proventil and spiriva

## 2019-07-05 NOTE — H&P ADULT - NSHPREVIEWOFSYSTEMS_GEN_ALL_CORE
CONSTITUTIONAL: No weakness, fevers or chills  EYES/ENT: No visual changes;  No vertigo or throat pain   NECK: No pain or stiffness  RESPIRATORY: No cough, wheezing, hemoptysis; No shortness of breath  CARDIOVASCULAR: No chest pain or palpitations  GASTROINTESTINAL: No abdominal or epigastric pain. No nausea, vomiting, or hematemesis; No diarrhea or constipation. No melena or hematochezia.  GENITOURINARY: No dysuria, frequency or hematuria  NEUROLOGICAL: No numbness or weakness  SKIN: No itching, rashes  All other negative                                CAPILLARY BLOOD GLUCOSE CONSTITUTIONAL: No weakness, fevers or chills  EYES/ENT: No visual changes;  No vertigo or throat pain   NECK: No pain or stiffness  RESPIRATORY: No cough, wheezing, hemoptysis; No shortness of breath  CARDIOVASCULAR: No chest pain or palpitations  GASTROINTESTINAL: No abdominal or epigastric pain. No nausea, vomiting, or hematemesis; No diarrhea or constipation. No melena or hematochezia.  GENITOURINARY: No dysuria, frequency or hematuria  NEUROLOGICAL: No numbness or weakness  SKIN: No itching, rashes  All other negative

## 2019-07-05 NOTE — H&P ADULT - HISTORY OF PRESENT ILLNESS
70 y/o M w/ pmhx of HTN, HLD, MORRO (on CPAP), recently diagnosed afib (on cardizem and Eliquis) and Gout presented to [place] after having one syncopal episode at home on 6/29.  At [facility], Trops negative and pt had ? vtach vs SVT episode (No EKG available) and now transferred to [facility] for possible EPS. Denies CP, SOB, HA, dizziness or fatigue 68 y/o M w/ pmhx of HTN, HLD, MORRO (on CPAP, Home O2 2L), recently diagnosed afib (on cardizem and Eliquis) and Gout presented to [facility] after having one syncopal episode at home on 6/29.  At [facility], Trops negative and pt had ? vtach vs SVT episode (No EKG available) and now transferred to [facility] for possible EPS. Denies CP, SOB, HA, dizziness or fatigue

## 2019-07-05 NOTE — H&P ADULT - NSHPSOCIALHISTORY_GEN_ALL_CORE
Denies Drinkg  Denies smoking  Denies recreational drug uses Denies Drinkg  Quit smoking 18 years ago when he was diagnosed w/ COPD (Home o2 2L 24hrs a day 7days a week )  Denies recreational drug uses

## 2019-07-05 NOTE — H&P ADULT - NSHPPHYSICALEXAM_GEN_ALL_CORE
PHYSICAL EXAM  Appearance: Normal, NAD  HEAD:  Atraumatic, Normocephalic  EYES:  PERRLA, conjunctiva and sclera clear  NECK: Supple, No JVD  CHEST/LUNG: expiratory wheezing on right >left  HEART: Normal S1, S2. No murmurs, rubs, or gallops  ABDOMEN: + Bowel sounds, Soft, NT, ND   EXTREMITIES:  2+ Peripheral Pulses, No clubbing, cyanosis, or edema  NEUROLOGY: non-focal, aaox3  Lymphatic: No lymphadenopathy  SKIN: No rashes or lesions

## 2019-07-05 NOTE — H&P ADULT - NSICDXPASTMEDICALHX_GEN_ALL_CORE_FT
PAST MEDICAL HISTORY:  COPD (chronic obstructive pulmonary disease)     Essential hypertension     HTN (hypertension)     Simple chronic bronchitis

## 2019-07-05 NOTE — H&P ADULT - NSHPLABSRESULTS_GEN_ALL_CORE
PENDING   ECHO (7/1) LVEF 60-65% Normal LV systolic function with moderate concentric LV hypertrophy Mitral valve midly sclerotic. Pulmonary vein doppler and tissue doppler suggest grade II diastrolic dysfunction with elevated left arterial pressure

## 2019-07-05 NOTE — H&P ADULT - PROBLEM SELECTOR PLAN 1
s/p syncopal episode  w/ hx of recently diagnosed w/ afib   CT brain done at [facility] showed chronic encephalomalacia of frontal lobes No acute changes  currently afib on tele w/ vent rate 'S  - Tele  - EP consult for possible EP STUDY  - Cont. Diltiazem 180 mg po daily s/p syncopal episode  w/ hx of recently diagnosed w/ afib   CT brain done at [facility] showed chronic encephalomalacia of frontal lobes No acute changes  currently afib on tele w/ vent rate 'S  - Tele  - EP consult for possible EP STUDY  - Cont. BB   - Eliquis to hep gtt for ischemic work up Monday by Dr. uHdson (7/8)

## 2019-07-05 NOTE — CHART NOTE - NSCHARTNOTEFT_GEN_A_CORE
====================  CCU MIDNIGHT ROUNDS  ====================    ALICE JUNIOR  [id number]    ====================  SUMMARY: HPI:  70 y/o M w/ pmhx of HTN, HLD, MORRO (on CPAP, Home O2 2L), recently diagnosed afib (on cardizem and Eliquis) and Gout presented to [facility] after having one syncopal episode at home on 6/29.  At [facility], Trops negative and pt had ? vtach vs SVT episode (No EKG available) and now transferred to [facility] for possible EPS. Denies CP, SOB, HA, dizziness or fatigue (05 Jul 2019 12:41)    ====================        ====================  NEW EVENTS:  ====================  Remains rate controlled AF, no events on tele.      ====================  VITALS (Last 12 hrs):  ====================    T(C): 36.4 (07-05-19 @ 19:00), Max: 36.7 (07-05-19 @ 11:33)  HR: 96 (07-05-19 @ 19:00) (81 - 113)  BP: 92/63 (07-05-19 @ 19:00) (92/63 - 129/75)  BP(mean): 70 (07-05-19 @ 19:00) (70 - 96)  RR: 18 (07-05-19 @ 19:00) (15 - 28)  SpO2: 95% (07-05-19 @ 19:00) (92% - 100%)      TELEMETRY: AF rate 80-110s      I&O's Summary      ====================  PLAN:  ====================    #Ventricular tachycardia  - s/p syncopal episode w/ hx of recently diagnosed afib  - CT brain done at [facility] showed chronic encephalomalacia of frontal lobes, no acute changes  - currently afib on tele w/ vent rate 's  - EP consult for possible EP study  - Cont metoprolol 25mg bid  - Cont Eliquis for now until 10am Sunday with plan to switch to heparin gtt 10pm Sunday for ischemic work up on Monday with Dr. Hudson (7/8)    #COPD  - Cont Advair, Proventil and Spiriva  - bipap overnight for MORRO    Chu Stauffer, CCU PA-C  #[id number] ====================  CCU MIDNIGHT ROUNDS  ====================    ALICE JUNIOR  [id number]    ====================  SUMMARY: HPI:  70 y/o M w/ pmhx of HTN, HLD, MORRO (on CPAP, Home O2 2L), recently diagnosed afib (on cardizem and Eliquis) and Gout presented to [place] after having one syncopal episode at home on 6/29.  At [place], Trops negative and pt had ? vtach vs SVT episode (No EKG available) and now transferred to [facility] for possible EPS. Denies CP, SOB, HA, dizziness or fatigue (05 Jul 2019 12:41)    ====================        ====================  NEW EVENTS:  ====================  Remains rate controlled AF, no events on tele.      ====================  VITALS (Last 12 hrs):  ====================    T(C): 36.4 (07-05-19 @ 19:00), Max: 36.7 (07-05-19 @ 11:33)  HR: 96 (07-05-19 @ 19:00) (81 - 113)  BP: 92/63 (07-05-19 @ 19:00) (92/63 - 129/75)  BP(mean): 70 (07-05-19 @ 19:00) (70 - 96)  RR: 18 (07-05-19 @ 19:00) (15 - 28)  SpO2: 95% (07-05-19 @ 19:00) (92% - 100%)      TELEMETRY: AF rate 80-110s      I&O's Summary      ====================  PLAN:  ====================    #Ventricular tachycardia  - s/p syncopal episode w/ hx of recently diagnosed afib  - CT brain done at [facility] showed chronic encephalomalacia of frontal lobes, no acute changes  - currently afib on tele w/ vent rate 's  - EP consult for possible EP study  - Cont metoprolol 25mg bid  - F/u echo  - Cont Eliquis for now until 10am Sunday with plan to switch to heparin gtt 10pm Sunday for ischemic work up on Monday with Dr. Hudson (7/8)    #COPD  - Cont Advair, Proventil and Spiriva  - bipap overnight for MORRO    Chu Stauffer, CCU PA-C  #[id number]

## 2019-07-06 DIAGNOSIS — R06.02 SHORTNESS OF BREATH: ICD-10-CM

## 2019-07-06 DIAGNOSIS — I47.2 VENTRICULAR TACHYCARDIA: ICD-10-CM

## 2019-07-06 DIAGNOSIS — R55 SYNCOPE AND COLLAPSE: ICD-10-CM

## 2019-07-06 LAB
ALBUMIN SERPL ELPH-MCNC: 3.3 G/DL — SIGNIFICANT CHANGE UP (ref 3.3–5)
ALP SERPL-CCNC: 111 U/L — SIGNIFICANT CHANGE UP (ref 40–120)
ALT FLD-CCNC: 75 U/L — HIGH (ref 10–45)
ANION GAP SERPL CALC-SCNC: 12 MMOL/L — SIGNIFICANT CHANGE UP (ref 5–17)
APTT BLD: 28.7 SEC — SIGNIFICANT CHANGE UP (ref 27.5–36.3)
AST SERPL-CCNC: 54 U/L — HIGH (ref 10–40)
BILIRUB SERPL-MCNC: 0.2 MG/DL — SIGNIFICANT CHANGE UP (ref 0.2–1.2)
BUN SERPL-MCNC: 36 MG/DL — HIGH (ref 7–23)
CALCIUM SERPL-MCNC: 8.4 MG/DL — SIGNIFICANT CHANGE UP (ref 8.4–10.5)
CHLORIDE SERPL-SCNC: 98 MMOL/L — SIGNIFICANT CHANGE UP (ref 96–108)
CHOLEST SERPL-MCNC: 117 MG/DL — SIGNIFICANT CHANGE UP (ref 10–199)
CO2 SERPL-SCNC: 31 MMOL/L — SIGNIFICANT CHANGE UP (ref 22–31)
CREAT SERPL-MCNC: 0.91 MG/DL — SIGNIFICANT CHANGE UP (ref 0.5–1.3)
GLUCOSE SERPL-MCNC: 110 MG/DL — HIGH (ref 70–99)
HCT VFR BLD CALC: 39.2 % — SIGNIFICANT CHANGE UP (ref 39–50)
HCV AB S/CO SERPL IA: 0.19 S/CO — SIGNIFICANT CHANGE UP (ref 0–0.99)
HCV AB SERPL-IMP: SIGNIFICANT CHANGE UP
HDLC SERPL-MCNC: 55 MG/DL — SIGNIFICANT CHANGE UP
HGB BLD-MCNC: 12.9 G/DL — LOW (ref 13–17)
INR BLD: 1.19 RATIO — HIGH (ref 0.88–1.16)
LIPID PNL WITH DIRECT LDL SERPL: 48 MG/DL — SIGNIFICANT CHANGE UP
MAGNESIUM SERPL-MCNC: 2.3 MG/DL — SIGNIFICANT CHANGE UP (ref 1.6–2.6)
MCHC RBC-ENTMCNC: 27.1 PG — SIGNIFICANT CHANGE UP (ref 27–34)
MCHC RBC-ENTMCNC: 32.8 GM/DL — SIGNIFICANT CHANGE UP (ref 32–36)
MCV RBC AUTO: 82.7 FL — SIGNIFICANT CHANGE UP (ref 80–100)
PHOSPHATE SERPL-MCNC: 4 MG/DL — SIGNIFICANT CHANGE UP (ref 2.5–4.5)
PLATELET # BLD AUTO: 333 K/UL — SIGNIFICANT CHANGE UP (ref 150–400)
POTASSIUM SERPL-MCNC: 4.3 MMOL/L — SIGNIFICANT CHANGE UP (ref 3.5–5.3)
POTASSIUM SERPL-SCNC: 4.3 MMOL/L — SIGNIFICANT CHANGE UP (ref 3.5–5.3)
PROT SERPL-MCNC: 6.7 G/DL — SIGNIFICANT CHANGE UP (ref 6–8.3)
PROTHROM AB SERPL-ACNC: 13.6 SEC — HIGH (ref 10–12.9)
RBC # BLD: 4.74 M/UL — SIGNIFICANT CHANGE UP (ref 4.2–5.8)
RBC # FLD: 13.4 % — SIGNIFICANT CHANGE UP (ref 10.3–14.5)
SODIUM SERPL-SCNC: 141 MMOL/L — SIGNIFICANT CHANGE UP (ref 135–145)
TOTAL CHOLESTEROL/HDL RATIO MEASUREMENT: 2.1 RATIO — LOW (ref 3.4–9.6)
TRIGL SERPL-MCNC: 68 MG/DL — SIGNIFICANT CHANGE UP (ref 10–149)
WBC # BLD: 11.5 K/UL — HIGH (ref 3.8–10.5)
WBC # FLD AUTO: 11.5 K/UL — HIGH (ref 3.8–10.5)

## 2019-07-06 PROCEDURE — 93306 TTE W/DOPPLER COMPLETE: CPT | Mod: 26

## 2019-07-06 PROCEDURE — 71045 X-RAY EXAM CHEST 1 VIEW: CPT | Mod: 26

## 2019-07-06 PROCEDURE — 99233 SBSQ HOSP IP/OBS HIGH 50: CPT

## 2019-07-06 PROCEDURE — 93010 ELECTROCARDIOGRAM REPORT: CPT

## 2019-07-06 RX ORDER — CHLORHEXIDINE GLUCONATE 213 G/1000ML
1 SOLUTION TOPICAL DAILY
Refills: 0 | Status: DISCONTINUED | OUTPATIENT
Start: 2019-07-06 | End: 2019-07-17

## 2019-07-06 RX ADMIN — APIXABAN 5 MILLIGRAM(S): 2.5 TABLET, FILM COATED ORAL at 10:35

## 2019-07-06 RX ADMIN — FINASTERIDE 5 MILLIGRAM(S): 5 TABLET, FILM COATED ORAL at 11:51

## 2019-07-06 RX ADMIN — APIXABAN 5 MILLIGRAM(S): 2.5 TABLET, FILM COATED ORAL at 22:08

## 2019-07-06 RX ADMIN — BUDESONIDE AND FORMOTEROL FUMARATE DIHYDRATE 2 PUFF(S): 160; 4.5 AEROSOL RESPIRATORY (INHALATION) at 06:21

## 2019-07-06 RX ADMIN — PANTOPRAZOLE SODIUM 40 MILLIGRAM(S): 20 TABLET, DELAYED RELEASE ORAL at 06:25

## 2019-07-06 RX ADMIN — Medication 25 MILLIGRAM(S): at 18:26

## 2019-07-06 RX ADMIN — BUDESONIDE AND FORMOTEROL FUMARATE DIHYDRATE 2 PUFF(S): 160; 4.5 AEROSOL RESPIRATORY (INHALATION) at 17:20

## 2019-07-06 RX ADMIN — TIOTROPIUM BROMIDE 1 CAPSULE(S): 18 CAPSULE ORAL; RESPIRATORY (INHALATION) at 13:15

## 2019-07-06 RX ADMIN — ATORVASTATIN CALCIUM 10 MILLIGRAM(S): 80 TABLET, FILM COATED ORAL at 22:08

## 2019-07-06 RX ADMIN — Medication 40 MILLIGRAM(S): at 06:25

## 2019-07-06 RX ADMIN — Medication 25 MILLIGRAM(S): at 06:24

## 2019-07-06 RX ADMIN — AMLODIPINE BESYLATE 10 MILLIGRAM(S): 2.5 TABLET ORAL at 06:24

## 2019-07-06 RX ADMIN — PREGABALIN 1000 MICROGRAM(S): 225 CAPSULE ORAL at 11:51

## 2019-07-06 RX ADMIN — TAMSULOSIN HYDROCHLORIDE 0.8 MILLIGRAM(S): 0.4 CAPSULE ORAL at 22:08

## 2019-07-06 NOTE — PROGRESS NOTE ADULT - SUBJECTIVE AND OBJECTIVE BOX
Admission date:  CHIEF COMPLAINT:  HPI:  70 y/o M w/ pmhx of HTN, HLD, MORRO (on CPAP, Home O2 2L), recently diagnosed afib (on cardizem and Eliquis) and Gout presented to [facility] after having one syncopal episode at home on .  At [place], Trops negative and pt had ? vtach vs SVT episode (No EKG available) and now transferred to [facility] for possible EPS. Denies CP, SOB, HA, dizziness or fatigue (2019 12:41)    INTERVAL HISTORY:    REVIEW OF SYSTEMS:    CONSTITUTIONAL: No weakness, fevers or chills  EYES/ENT: No visual changes;  No vertigo or throat pain   NECK: No pain or stiffness  RESPIRATORY: No cough, wheezing, hemoptysis; No shortness of breath  CARDIOVASCULAR: No chest pain or palpitations  GASTROINTESTINAL: No abdominal or epigastric pain. No nausea, vomiting, or hematemesis; No diarrhea or constipation. No melena or hematochezia.  GENITOURINARY: No dysuria, frequency or hematuria  NEUROLOGICAL: No numbness or weakness  SKIN: No itching, rashes      MEDICATIONS  (STANDING):  amLODIPine   Tablet 10 milliGRAM(s) Oral daily  apixaban 5 milliGRAM(s) Oral every 12 hours  atorvastatin 10 milliGRAM(s) Oral at bedtime  buDESOnide 160 MICROgram(s)/formoterol 4.5 MICROgram(s) Inhaler 2 Puff(s) Inhalation two times a day  cyanocobalamin 1000 MICROGram(s) Oral daily  finasteride 5 milliGRAM(s) Oral daily  furosemide    Tablet 40 milliGRAM(s) Oral daily  metoprolol tartrate 25 milliGRAM(s) Oral two times a day  pantoprazole    Tablet 40 milliGRAM(s) Oral before breakfast  tamsulosin 0.8 milliGRAM(s) Oral at bedtime  tiotropium 18 MICROgram(s) Capsule 1 Capsule(s) Inhalation daily    MEDICATIONS  (PRN):  ALBUTerol    90 MICROgram(s) HFA Inhaler 2 Puff(s) Inhalation every 6 hours PRN Shortness of Breath and/or Wheezing      Objective:  ICU Vital Signs Last 24 Hrs  T(C): 37 (2019 04:00), Max: 37.1 (2019 00:00)  T(F): 98.6 (2019 04:00), Max: 98.8 (2019 00:00)  HR: 62 (2019 06:22) (62 - 113)  BP: 106/56 (2019 06:00) (82/58 - 129/89)  BP(mean): 73 (2019 06:00) (66 - 96)  ABP: --  ABP(mean): --  RR: 13 (2019 06:00) (11 - 28)  SpO2: 97% (2019 06:22) (92% - 100%)      07- @ 07:01  -  - @ 07:00  --------------------------------------------------------  IN: 740 mL / OUT: 750 mL / NET: -10 mL      Daily Height in cm: 182.88 (2019 11:33)    Daily Weight in k.3 (2019 03:00)    PHYSICAL EXAM:    General: WN/WD NAD  Neurology: Awake, nonfocal, MATHIS x 4  Eyes: Scleras clear, PERRLA/ EOMI, Gross vision intact  ENT:Gross hearing intact, grossly patent pharynx, no stridor  Neck: Neck supple, trachea midline, No JVD,   Respiratory: CTA B/L, No wheezing, rales, rhonchi  CV: RRR, S1S2, no murmurs, rubs or gallops  Abdominal: Soft, NT, ND +BS,   Extremities: No edema, + peripheral pulses  Skin: No Rashes, Hematoma, Ecchymosis  Lymphatic: No Neck, axilla, groin LAD  Psych: Oriented x 3, normal affect  Incisions:   Tubes:      TELEMETRY:     EKG:   < from: 12 Lead ECG (16 @ 18:35) >  Ventricular Rate 83 BPM    Atrial Rate 83 BPM    P-R Interval 174 ms    QRS Duration 98 ms     ms    QTc 455 ms    P Axis 23 degrees    R Axis 52 degrees    T Axis 50 degrees    Diagnosis Line NORMAL SINUS RHYTHM  NORMAL ECG      IMAGING:  < from: Transthoracic Echocardiogram (16 @ 23:58) >  Conclusions:  1. Endocardium not well visualized; grossly normal left  ventricular systolic function.  Endocardial visualization  enhanced with intravenous injection of echo contrast  (Definity).  2. Mild-moderate  diastolic dysfunction.  3. The right ventricle is not well visualized; grossly  normal right ventricular systolic function.  4. Pericardial fat pad seen.  *** No previous Echo exam.    < end of copied text >    Labs:                          12.9   11.5  )-----------( 333      ( 2019 04:31 )             39.2     07-06    141  |  98  |  36<H>  ----------------------------<  110<H>  4.3   |  31  |  0.91    Ca    8.4      2019 04:31  Phos  4.0     07-  Mg     2.3     07-06    TPro  6.7  /  Alb  3.3  /  TBili  0.2  /  DBili  x   /  AST  54<H>  /  ALT  75<H>  /  AlkPhos  111  07-06    LIVER FUNCTIONS - ( 2019 04:31 )  Alb: 3.3 g/dL / Pro: 6.7 g/dL / ALK PHOS: 111 U/L / ALT: 75 U/L / AST: 54 U/L / GGT: x           PT/INR - ( 2019 04:31 )   PT: 13.6 sec;   INR: 1.19 ratio         PTT - ( 2019 04:31 )  PTT:28.7 sec      HEALTH ISSUES - PROBLEM Dx:  HTN (hypertension): HTN (hypertension)  COPD (chronic obstructive pulmonary disease): COPD (chronic obstructive pulmonary disease)  Syncope: Syncope

## 2019-07-06 NOTE — PROGRESS NOTE ADULT - SUBJECTIVE AND OBJECTIVE BOX
CHIEF COMPLAINT: 69 year male  h/o HTN former smoker , COPD, new onset AFIB , syncope, WCT @200 BPM, cardiomyopathy      PAST MEDICAL & SURGICAL HISTORY:  COPD (chronic obstructive pulmonary disease)  HTN (hypertension)  Simple chronic bronchitis  Essential hypertension  No significant past surgical history  No significant past surgical history      HPI: 69 year male , HTN,  former cigarette smoker, COPD, no prior cardiac evaluation, presented to primary care physician ( Dr Pooja Gupta) with progressive dyspnea, found to be in atrial fibrillation with rapid ventricular response, started on cardizem and eliquis, presented to [facility]   episode of syncope. Patient was exiting bathroom and thereafter , heard a " buzz" in his head, and then had abrupt loss of consciousness Found in AFIB HR  -160 mmHg, rule out MI, troponin negative, BNP mildly elevated  (approx 495 )  and CXR  bilateral interstital prominence, consistent with  CHF. Telemetry on  demonstrated approx 50 sec wide complex tachycardia, regularized at 200 BPM, then a few seconds of atrial fibrillation then again an episode WCT @ 200 BPM @ 40 sec, then muliple shorter runs noted ( strips placed in front of chart). I was asked to evaluate. Cardizem discontinued and patient was started on metoprolol, Patient was noted tachypnic at rest, and treated with Lasix 40 IV 1 12, and prednisone with improvement in respiratory status, followup CXR improved. ECHO with definity at [place] noted with anterior septal / anterior apical hypokinesis. Transferred to [facility], echo demonstrating EF 45%, septal hypokinesis. Patient is scheduled for [facility] with Dr Islas on Monday, EPS evaluation.  ( I will be out of town as of  afternoon- Dr Cox aware and covering )                PREVIOUS DIAGNOSTIC TESTING:      ECHO  FINDINGS:    STRESS  FINDINGS:    CATHETERIZATION  FINDINGS:    ELECTROPHYSIOLOGY STUDY  FINDINGS:    CAROTID ULTRASOUND:  FINDINGS    VENOUS DUPLEX SCAN:  FINDINGS:    CHEST CT PULMONARY ANGIO with IV Contrast:  FINDINGS:  MEDICATIONS  (STANDING):  amLODIPine   Tablet 10 milliGRAM(s) Oral daily  apixaban 5 milliGRAM(s) Oral every 12 hours  atorvastatin 10 milliGRAM(s) Oral at bedtime  buDESOnide 160 MICROgram(s)/formoterol 4.5 MICROgram(s) Inhaler 2 Puff(s) Inhalation two times a day  chlorhexidine 2% Cloths 1 Application(s) Topical daily  cyanocobalamin 1000 MICROGram(s) Oral daily  finasteride 5 milliGRAM(s) Oral daily  furosemide    Tablet 40 milliGRAM(s) Oral daily  metoprolol tartrate 25 milliGRAM(s) Oral two times a day  pantoprazole    Tablet 40 milliGRAM(s) Oral before breakfast  tamsulosin 0.8 milliGRAM(s) Oral at bedtime  tiotropium 18 MICROgram(s) Capsule 1 Capsule(s) Inhalation daily    MEDICATIONS  (PRN):  ALBUTerol    90 MICROgram(s) HFA Inhaler 2 Puff(s) Inhalation every 6 hours PRN Shortness of Breath and/or Wheezing      FAMILY HISTORY:  No pertinent family history in first degree relatives  No pertinent family history in first degree relatives      SOCIAL HISTORY:    CIGARETTES:    ALCOHOL:    REVIEW OF SYSTEMS:    CONSTITUTIONAL: No fever, weight loss, chills, shakes, or fatigue  EYES: No eye pain, visual disturbances, or discharge  ENMT:  No difficulty hearing, tinnitus, vertigo; No sinus or throat pain  NECK: No pain or stiffness  BREASTS: No pain, masses, or nipple discharge  RESPIRATORY: No cough, wheezing, hemoptysis, . Chronic dyspnea, improved  CARDIOVASCULAR: No chest pain,   GASTROINTESTINAL: No abdominal  or epigastric pain, nausea, vomiting, hematemesis, diarrhea, constipation, melena or bright red blood.  GENITOURINARY: No dysuria, nocturia, hematuria, or urinary incontinence  NEUROLOGICAL: No headaches, memory loss, slurred speech, limb weakness, loss of strength, numbness, or tremors  SKIN: No itching, burning, rashes, or lesions   LYMPH NODES: No enlarged glands  ENDOCRINE: No heat or cold intolerance, or hair loss  MUSCULOSKELETAL: No joint pain or swelling, muscle, back, or extremity pain  PSYCHIATRIC: No depression, anxiety, or difficulty sleeping  HEME/LYMPH: No easy bruising or bleeding gums  ALLERY AND IMMUNOLOGIC: No hives or rash.      Vital Signs Last 24 Hrs  T(C): 36.6 (2019 17:00), Max: 37.1 (2019 00:00)  T(F): 97.8 (2019 17:00), Max: 98.8 (2019 00:00)  HR: 79 (2019 18:00) (62 - 97)  BP: 103/60 (2019 18:00) (82/58 - 132/90)  BP(mean): 73 (2019 18:00) (62 - 106)  RR: 16 (2019 18:00) (11 - 28)  SpO2: 97% (2019 18:00) (91% - 98%)  Daily     Daily Weight in k.3 (2019 03:00)     @ 07: @ 07:00  --------------------------------------------------------  IN: 740 mL / OUT: 750 mL / NET: -10 mL     @ :  -   @ 18:28  --------------------------------------------------------  IN: 800 mL / OUT: 625 mL / NET: 175 mL          PHYSICAL EXAM:    GENERAL: In no apparent distress, well nourished, and hydrated.  HEAD:  Atraumatic, Normocephalic  EYES: EOMI, PERRLA, conjunctiva and sclera clear  ENMT: No tonsillar erythema, exudates, or enlargement; Moist mucous membranes, Good dentition, No lesions  NECK: Supple and normal thyroid.  No JVD or carotid bruit.  Carotid pulse is 2+ bilaterally.  HEART:  irregularly irregular No murmurs, rubs, or gallops.  PULMONARY: Clear to auscultation and perfusion.  No rales, wheezing, or rhonchi bilaterally.  ABDOMEN: Soft, Nontender, Nondistended; Bowel sounds present  EXTREMITIES:   No clubbing, cyanosis, or edema  LYMPH: No lymphadenopathy noted  NEUROLOGICAL: Grossly nonfocal          INTERPRETATION OF TELEMETRY:    ECG:    I&O's Detail    2019 07:01  -  2019 07:00  --------------------------------------------------------  IN:    IV PiggyBack: 250 mL    Oral Fluid: 490 mL  Total IN: 740 mL    OUT:    Voided: 750 mL  Total OUT: 750 mL    Total NET: -10 mL      2019 07:  -  2019 18:28  --------------------------------------------------------  IN:    Oral Fluid: 800 mL  Total IN: 800 mL    OUT:    Voided: 625 mL  Total OUT: 625 mL    Total NET: 175 mL          LABS:                        12.9   11.5  )-----------( 333      ( 2019 04:31 )             39.2     -    141  |  98  |  36<H>  ----------------------------<  110<H>  4.3   |  31  |  0.91    Ca    8.4      2019 04:31  Phos  4.0       Mg     2.3         TPro  6.7  /  Alb  3.3  /  TBili  0.2  /  DBili  x   /  AST  54<H>  /  ALT  75<H>  /  AlkPhos  111          PT/INR - ( 2019 04:31 )   PT: 13.6 sec;   INR: 1.19 ratio         PTT - ( 2019 04:31 )  PTT:28.7 sec    BNP  I&O's Detail    2019 07:  -  2019 07:00  --------------------------------------------------------  IN:    IV PiggyBack: 250 mL    Oral Fluid: 490 mL  Total IN: 740 mL    OUT:    Voided: 750 mL  Total OUT: 750 mL    Total NET: -10 mL      2019 07:  -  2019 18:28  --------------------------------------------------------  IN:    Oral Fluid: 800 mL  Total IN: 800 mL    OUT:    Voided: 625 mL  Total OUT: 625 mL    Total NET: 175 mL        Daily     Daily Weight in k.3 (2019 03:00)    RADIOLOGY & ADDITIONAL STUDIES:

## 2019-07-06 NOTE — PROGRESS NOTE ADULT - ASSESSMENT
69 year male with history of HTN ,  COPD smoker, AFIB persistent of relatively new onset, progressive dyspnea,  with syncope.  Wide complex tachycardia, concern for VT in setting of regional wall motion abnormality, suggestive of underlying CAD. Improved on beta blocker and steroids, as well as furosemide

## 2019-07-06 NOTE — PROGRESS NOTE ADULT - PROBLEM SELECTOR PLAN 1
s/p syncopal episode  w/ hx of recently diagnosed w/ afib   CT brain done at [place] showed chronic encephalomalacia of frontal lobes No acute changes  currently afib on tele w/ vent rate 'S  - Tele  - EP consult for possible EP STUDY  - Cont. BB   - Eliquis to hep gtt for ischemic work up Monday by Dr. Hudson (7/8)

## 2019-07-06 NOTE — PROGRESS NOTE ADULT - PROBLEM SELECTOR PLAN 1
Right and left heart cath, continue metoprolol, last dose eliquis 5 mg tonight. last dose Eliquis tonight , start heparin in AM if possible, EPS to decide further evaluation thereafter

## 2019-07-06 NOTE — CHART NOTE - NSCHARTNOTEFT_GEN_A_CORE
CCU Transfer Note    Transfer from: CCU    Transfer to:    (  x) Telemetry        Accepting Physician:     Signout given to:     CCU COURSE:  70 y/o M w/ pmhx of HTN, HLD, MORRO (on CPAP), recently diagnosed afib (on cardizem and Eliquis) and Gout presented to [facility] after having one syncopal episode at home on 6/29.  At [facility], Trops negative and pt had ? vtach vs SVT episode (No EKG available) and now transferred to [facility] for possible EPS and R/LHC.      REVIEW OF SYSTEMS:    CONSTITUTIONAL: No weakness, fevers or chills  EYES/ENT: No visual changes;  No vertigo or throat pain   NECK: No pain or stiffness  RESPIRATORY: No cough, wheezing, hemoptysis; No shortness of breath  CARDIOVASCULAR: No chest pain or palpitations  GASTROINTESTINAL: No abdominal or epigastric pain. No nausea, vomiting, or hematemesis; No diarrhea or constipation. No melena or hematochezia.  GENITOURINARY: No dysuria, frequency or hematuria  NEUROLOGICAL: No numbness or weakness  SKIN: No itching, rashes      PAST MEDICAL & SURGICAL HISTORY:  COPD (chronic obstructive pulmonary disease)  HTN (hypertension)  Simple chronic bronchitis  Essential hypertension  No significant past surgical history  No significant past surgical history      Vital Signs Last 24 Hrs  T(C): 36.7 (06 Jul 2019 13:17), Max: 37.1 (06 Jul 2019 00:00)  T(F): 98.1 (06 Jul 2019 13:17), Max: 98.8 (06 Jul 2019 00:00)  HR: 80 (06 Jul 2019 17:16) (62 - 113)  BP: 115/59 (06 Jul 2019 16:00) (82/58 - 132/90)  BP(mean): 77 (06 Jul 2019 16:00) (66 - 106)  RR: 21 (06 Jul 2019 16:00) (11 - 28)  SpO2: 91% (06 Jul 2019 17:16) (91% - 98%)  I&O's Summary    05 Jul 2019 07:01  -  06 Jul 2019 07:00  --------------------------------------------------------  IN: 740 mL / OUT: 750 mL / NET: -10 mL    06 Jul 2019 07:01  -  06 Jul 2019 17:40  --------------------------------------------------------  IN: 520 mL / OUT: 375 mL / NET: 145 mL      Allergies    No Known Allergies    Intolerances      MEDICATIONS  (STANDING):  amLODIPine   Tablet 10 milliGRAM(s) Oral daily  apixaban 5 milliGRAM(s) Oral every 12 hours  atorvastatin 10 milliGRAM(s) Oral at bedtime  buDESOnide 160 MICROgram(s)/formoterol 4.5 MICROgram(s) Inhaler 2 Puff(s) Inhalation two times a day  chlorhexidine 2% Cloths 1 Application(s) Topical daily  cyanocobalamin 1000 MICROGram(s) Oral daily  finasteride 5 milliGRAM(s) Oral daily  furosemide    Tablet 40 milliGRAM(s) Oral daily  metoprolol tartrate 25 milliGRAM(s) Oral two times a day  pantoprazole    Tablet 40 milliGRAM(s) Oral before breakfast  tamsulosin 0.8 milliGRAM(s) Oral at bedtime  tiotropium 18 MICROgram(s) Capsule 1 Capsule(s) Inhalation daily    MEDICATIONS  (PRN):  ALBUTerol    90 MICROgram(s) HFA Inhaler 2 Puff(s) Inhalation every 6 hours PRN Shortness of Breath and/or Wheezing      Adult Advanced Hemodynamics Last 24 Hrs  CVP(mm Hg): --  CVP(cm H2O): --  CO: --  CI: --  PA: --  PA(mean): --  PCWP: --  SVR: --  SVRI: --  PVR: --  PVRI: --                              12.9   11.5  )-----------( 333      ( 06 Jul 2019 04:31 )             39.2     07-06    141  |  98  |  36<H>  ----------------------------<  110<H>  4.3   |  31  |  0.91    Ca    8.4      06 Jul 2019 04:31  Phos  4.0     07-06  Mg     2.3     07-06    TPro  6.7  /  Alb  3.3  /  TBili  0.2  /  DBili  x   /  AST  54<H>  /  ALT  75<H>  /  AlkPhos  111  07-06    PT/INR - ( 06 Jul 2019 04:31 )   PT: 13.6 sec;   INR: 1.19 ratio         PTT - ( 06 Jul 2019 04:31 )  PTT:28.7 sec      PHYSICAL EXAM:    General: WN/WD NAD  Neurology: Awake, nonfocal, MATHIS x 4  Eyes: Scleras clear, PERRLA/ EOMI, Gross vision intact  ENT:Gross hearing intact, grossly patent pharynx, no stridor  Neck: Neck supple, trachea midline, No JVD,   Respiratory: CTA B/L, No wheezing, rales, rhonchi  CV: RRR, S1S2, no murmurs, rubs or gallops  Abdominal: Soft, NT, ND +BS,   Extremities: No edema, + peripheral pulses  Skin: No Rashes, Hematoma, Ecchymosis  Lymphatic: No Neck, axilla, groin LAD  Psych: Oriented x 3, normal affect      Lactate:    Ekg:  < from: 12 Lead ECG (07.06.19 @ 08:10) >    Ventricular Rate 72 BPM    Atrial Rate 357 BPM    QRS Duration 84 ms    Q-T Interval 404 ms    QTC Calculation(Bezet) 442 ms    R Axis 92 degrees    T Axis 87 degrees    Diagnosis Line ATRIAL FIBRILLATION  RIGHTWARD AXIS  NONSPECIFIC T WAVE ABNORMALITY , PROBABLY DIGITALIS EFFECT  ABNORMAL ECG    < end of copied text >      Telemetry:  Echo:  < from: TTE with Doppler (w/Cont) (07.06.19 @ 10:06) >    Conclusions: EF 43%  1. The aortic valve leaflets are mildly thickened.  There  is aortic sclerosis but no stenosis. No aortic valve  regurgitation seen.  2. The left ventricular size is normal  3. There is septal hypokineisis.   The LVEF is about 45%.  4. The right ventricle appears moderately dilated with  mildly reduced function.  5. There is no significantmitral or tricuspid  regurgitation.  6. There is no pericardial effusion.    < end of copied text >    Cardiac Cath:  Stress Test:  Imaging:    ASSESSMENT & PLAN:     · Assessment	  70 y/o M w/ pmhx of HTN, HLD, MORRO (on CPAP), recently diagnosed afib (on cardizem and Eliquis) and Gout presented to [facility] after having one syncopal episode at home on 6/29.  At [facility], Trops negative and pt had ? vtach vs SVT episode (No EKG available) and now transferred to [facility] for possible EPS.     Problem/Plan - 1:  ·  Problem: Syncope.  Plan: s/p syncopal episode  w/ hx of recently diagnosed w/ afib   CT brain done at [facility] showed chronic encephalomalacia of frontal lobes No acute changes  currently afib on tele w/ vent rate 'S  - Tele  - EP consult for possible EP STUDY  - Cont. BB   - Eliquis to hep gtt for ischemic work up Monday by Dr. Hudson (7/8).   - 7/6 TTE EF 45%,  There is aortic sclerosis but no stenosis. There is septal hypokinesis.   The right ventricle appears moderately dilated with mildly reduced function.       Problem/Plan - 2:  ·  Problem: COPD (chronic obstructive pulmonary disease).  Plan: Hx of COPD and MORRO  mild expiratory wheezing R>L  Continue home neb tx  - Cont. Advair, proventil and spiriva.      Problem/Plan - 3:  ·  Problem: HTN (hypertension).       FOR FOLLOW UP:  - EP consult for possible EP STUDY  - Continue Eliquis until Sunday morning 10am then to hep gtt for 7/7/19 10pm for 7/8 [facility]  Monday by Dr. Hudson (7/8). CCU Transfer Note    Transfer from: CCU    Transfer to:    (  x) Telemetry        Accepting Physician: Haleigh Michelle MD    Signout given to: Haleigh Michelle MD    CCU COURSE:  68 y/o M w/ pmhx of HTN, HLD, MORRO (on CPAP), recently diagnosed afib (on cardizem and Eliquis) and Gout presented to [place] after having one syncopal episode at home on 6/29.  At [place], Trops negative and pt had ? vtach vs SVT episode (No EKG available) and now transferred to [facility] for possible EPS and R/LHC.      REVIEW OF SYSTEMS:    CONSTITUTIONAL: No weakness, fevers or chills  EYES/ENT: No visual changes;  No vertigo or throat pain   NECK: No pain or stiffness  RESPIRATORY: No cough, wheezing, hemoptysis; No shortness of breath  CARDIOVASCULAR: No chest pain or palpitations  GASTROINTESTINAL: No abdominal or epigastric pain. No nausea, vomiting, or hematemesis; No diarrhea or constipation. No melena or hematochezia.  GENITOURINARY: No dysuria, frequency or hematuria  NEUROLOGICAL: No numbness or weakness  SKIN: No itching, rashes      PAST MEDICAL & SURGICAL HISTORY:  COPD (chronic obstructive pulmonary disease)  HTN (hypertension)  Simple chronic bronchitis  Essential hypertension  No significant past surgical history  No significant past surgical history      Vital Signs Last 24 Hrs  T(C): 36.7 (06 Jul 2019 13:17), Max: 37.1 (06 Jul 2019 00:00)  T(F): 98.1 (06 Jul 2019 13:17), Max: 98.8 (06 Jul 2019 00:00)  HR: 80 (06 Jul 2019 17:16) (62 - 113)  BP: 115/59 (06 Jul 2019 16:00) (82/58 - 132/90)  BP(mean): 77 (06 Jul 2019 16:00) (66 - 106)  RR: 21 (06 Jul 2019 16:00) (11 - 28)  SpO2: 91% (06 Jul 2019 17:16) (91% - 98%)  I&O's Summary    05 Jul 2019 07:01  -  06 Jul 2019 07:00  --------------------------------------------------------  IN: 740 mL / OUT: 750 mL / NET: -10 mL    06 Jul 2019 07:01  -  06 Jul 2019 17:40  --------------------------------------------------------  IN: 520 mL / OUT: 375 mL / NET: 145 mL      Allergies    No Known Allergies    Intolerances      MEDICATIONS  (STANDING):  amLODIPine   Tablet 10 milliGRAM(s) Oral daily  apixaban 5 milliGRAM(s) Oral every 12 hours  atorvastatin 10 milliGRAM(s) Oral at bedtime  buDESOnide 160 MICROgram(s)/formoterol 4.5 MICROgram(s) Inhaler 2 Puff(s) Inhalation two times a day  chlorhexidine 2% Cloths 1 Application(s) Topical daily  cyanocobalamin 1000 MICROGram(s) Oral daily  finasteride 5 milliGRAM(s) Oral daily  furosemide    Tablet 40 milliGRAM(s) Oral daily  metoprolol tartrate 25 milliGRAM(s) Oral two times a day  pantoprazole    Tablet 40 milliGRAM(s) Oral before breakfast  tamsulosin 0.8 milliGRAM(s) Oral at bedtime  tiotropium 18 MICROgram(s) Capsule 1 Capsule(s) Inhalation daily    MEDICATIONS  (PRN):  ALBUTerol    90 MICROgram(s) HFA Inhaler 2 Puff(s) Inhalation every 6 hours PRN Shortness of Breath and/or Wheezing      Adult Advanced Hemodynamics Last 24 Hrs  CVP(mm Hg): --  CVP(cm H2O): --  CO: --  CI: --  PA: --  PA(mean): --  PCWP: --  SVR: --  SVRI: --  PVR: --  PVRI: --                              12.9   11.5  )-----------( 333      ( 06 Jul 2019 04:31 )             39.2     07-06    141  |  98  |  36<H>  ----------------------------<  110<H>  4.3   |  31  |  0.91    Ca    8.4      06 Jul 2019 04:31  Phos  4.0     07-06  Mg     2.3     07-06    TPro  6.7  /  Alb  3.3  /  TBili  0.2  /  DBili  x   /  AST  54<H>  /  ALT  75<H>  /  AlkPhos  111  07-06    PT/INR - ( 06 Jul 2019 04:31 )   PT: 13.6 sec;   INR: 1.19 ratio         PTT - ( 06 Jul 2019 04:31 )  PTT:28.7 sec      PHYSICAL EXAM:    General: WN/WD NAD  Neurology: Awake, nonfocal, MATHIS x 4  Eyes: Scleras clear, PERRLA/ EOMI, Gross vision intact  ENT:Gross hearing intact, grossly patent pharynx, no stridor  Neck: Neck supple, trachea midline, No JVD,   Respiratory: CTA B/L, No wheezing, rales, rhonchi  CV: RRR, S1S2, no murmurs, rubs or gallops  Abdominal: Soft, NT, ND +BS,   Extremities: No edema, + peripheral pulses  Skin: No Rashes, Hematoma, Ecchymosis  Lymphatic: No Neck, axilla, groin LAD  Psych: Oriented x 3, normal affect      Lactate:    Ekg:  < from: 12 Lead ECG (07.06.19 @ 08:10) >    Ventricular Rate 72 BPM    Atrial Rate 357 BPM    QRS Duration 84 ms    Q-T Interval 404 ms    QTC Calculation(Bezet) 442 ms    R Axis 92 degrees    T Axis 87 degrees    Diagnosis Line ATRIAL FIBRILLATION  RIGHTWARD AXIS  NONSPECIFIC T WAVE ABNORMALITY , PROBABLY DIGITALIS EFFECT  ABNORMAL ECG    < end of copied text >      Telemetry:  Echo:  < from: TTE with Doppler (w/Cont) (07.06.19 @ 10:06) >    Conclusions: EF 43%  1. The aortic valve leaflets are mildly thickened.  There  is aortic sclerosis but no stenosis. No aortic valve  regurgitation seen.  2. The left ventricular size is normal  3. There is septal hypokineisis.   The LVEF is about 45%.  4. The right ventricle appears moderately dilated with  mildly reduced function.  5. There is no significantmitral or tricuspid  regurgitation.  6. There is no pericardial effusion.    < end of copied text >    Cardiac Cath:  Stress Test:  Imaging:    ASSESSMENT & PLAN:     · Assessment	  68 y/o M w/ pmhx of HTN, HLD, MORRO (on CPAP), recently diagnosed afib (on cardizem and Eliquis) and Gout presented to [place] after having one syncopal episode at home on 6/29.  At [place], Trops negative and pt had ? vtach vs SVT episode (No EKG available) and now transferred to [facility] for possible EPS.     Problem/Plan - 1:  ·  Problem: Syncope.  Plan: s/p syncopal episode  w/ hx of recently diagnosed w/ afib   CT brain done at [place] showed chronic encephalomalacia of frontal lobes No acute changes  currently afib on tele w/ vent rate 'S  - Tele  - EP consult for possible EP STUDY  - Cont. BB   - Eliquis to hep gtt for ischemic work up Monday by Dr. Hudson (7/8).   - 7/6 TTE EF 45%,  There is aortic sclerosis but no stenosis. There is septal hypokinesis.   The right ventricle appears moderately dilated with mildly reduced function.       Problem/Plan - 2:  ·  Problem: COPD (chronic obstructive pulmonary disease).  Plan: Hx of COPD and MORRO  mild expiratory wheezing R>L  Continue home neb tx  - Cont. Advair, proventil and spiriva.      Problem/Plan - 3:  ·  Problem: HTN (hypertension).       FOR FOLLOW UP:  - EP consult for possible EP STUDY  - Continue Eliquis until Sunday morning 10am then to hep gtt for 7/7/19 10pm for 7/8 [facility]  Monday by Dr. Hudson (7/8). CCU Transfer Note    Transfer from: CCU    Transfer to:    (  x) Telemetry        Accepting Physician: Haleigh Michelle MD    Signout given to: Haleigh Michelle MD    CCU COURSE:  70 y/o M w/ pmhx of HTN, HLD, MORRO (on CPAP), recently diagnosed afib (on cardizem and Eliquis) and Gout presented to [place] after having one syncopal episode at home on 6/29.  At [place], Trops negative and pt had ? vtach vs SVT episode (No EKG available) and now transferred to [facility] for possible EPS and R/LHC.      REVIEW OF SYSTEMS:    CONSTITUTIONAL: No weakness, fevers or chills  EYES/ENT: No visual changes;  No vertigo or throat pain   NECK: No pain or stiffness  RESPIRATORY: No cough, wheezing, hemoptysis; No shortness of breath  CARDIOVASCULAR: No chest pain or palpitations  GASTROINTESTINAL: No abdominal or epigastric pain. No nausea, vomiting, or hematemesis; No diarrhea or constipation. No melena or hematochezia.  GENITOURINARY: No dysuria, frequency or hematuria  NEUROLOGICAL: No numbness or weakness  SKIN: No itching, rashes      PAST MEDICAL & SURGICAL HISTORY:  COPD (chronic obstructive pulmonary disease)  HTN (hypertension)  Simple chronic bronchitis  Essential hypertension  No significant past surgical history  No significant past surgical history      Vital Signs Last 24 Hrs  T(C): 36.7 (06 Jul 2019 13:17), Max: 37.1 (06 Jul 2019 00:00)  T(F): 98.1 (06 Jul 2019 13:17), Max: 98.8 (06 Jul 2019 00:00)  HR: 80 (06 Jul 2019 17:16) (62 - 113)  BP: 115/59 (06 Jul 2019 16:00) (82/58 - 132/90)  BP(mean): 77 (06 Jul 2019 16:00) (66 - 106)  RR: 21 (06 Jul 2019 16:00) (11 - 28)  SpO2: 91% (06 Jul 2019 17:16) (91% - 98%)  I&O's Summary    05 Jul 2019 07:01  -  06 Jul 2019 07:00  --------------------------------------------------------  IN: 740 mL / OUT: 750 mL / NET: -10 mL    06 Jul 2019 07:01  -  06 Jul 2019 17:40  --------------------------------------------------------  IN: 520 mL / OUT: 375 mL / NET: 145 mL      Allergies    No Known Allergies    Intolerances      MEDICATIONS  (STANDING):  amLODIPine   Tablet 10 milliGRAM(s) Oral daily  apixaban 5 milliGRAM(s) Oral every 12 hours  atorvastatin 10 milliGRAM(s) Oral at bedtime  buDESOnide 160 MICROgram(s)/formoterol 4.5 MICROgram(s) Inhaler 2 Puff(s) Inhalation two times a day  chlorhexidine 2% Cloths 1 Application(s) Topical daily  cyanocobalamin 1000 MICROGram(s) Oral daily  finasteride 5 milliGRAM(s) Oral daily  furosemide    Tablet 40 milliGRAM(s) Oral daily  metoprolol tartrate 25 milliGRAM(s) Oral two times a day  pantoprazole    Tablet 40 milliGRAM(s) Oral before breakfast  tamsulosin 0.8 milliGRAM(s) Oral at bedtime  tiotropium 18 MICROgram(s) Capsule 1 Capsule(s) Inhalation daily    MEDICATIONS  (PRN):  ALBUTerol    90 MICROgram(s) HFA Inhaler 2 Puff(s) Inhalation every 6 hours PRN Shortness of Breath and/or Wheezing      Adult Advanced Hemodynamics Last 24 Hrs  CVP(mm Hg): --  CVP(cm H2O): --  CO: --  CI: --  PA: --  PA(mean): --  PCWP: --  SVR: --  SVRI: --  PVR: --  PVRI: --                              12.9   11.5  )-----------( 333      ( 06 Jul 2019 04:31 )             39.2     07-06    141  |  98  |  36<H>  ----------------------------<  110<H>  4.3   |  31  |  0.91    Ca    8.4      06 Jul 2019 04:31  Phos  4.0     07-06  Mg     2.3     07-06    TPro  6.7  /  Alb  3.3  /  TBili  0.2  /  DBili  x   /  AST  54<H>  /  ALT  75<H>  /  AlkPhos  111  07-06    PT/INR - ( 06 Jul 2019 04:31 )   PT: 13.6 sec;   INR: 1.19 ratio         PTT - ( 06 Jul 2019 04:31 )  PTT:28.7 sec      PHYSICAL EXAM:    General: WN/WD NAD  Neurology: Awake, nonfocal, MATHIS x 4  Eyes: Scleras clear, PERRLA/ EOMI, Gross vision intact  ENT:Gross hearing intact, grossly patent pharynx, no stridor  Neck: Neck supple, trachea midline, No JVD,   Respiratory: CTA B/L, No wheezing, rales, rhonchi  CV: RRR, S1S2, no murmurs, rubs or gallops  Abdominal: Soft, NT, ND +BS,   Extremities: No edema, + peripheral pulses  Skin: No Rashes, Hematoma, Ecchymosis  Lymphatic: No Neck, axilla, groin LAD  Psych: Oriented x 3, normal affect      Lactate:    Ekg:  < from: 12 Lead ECG (07.06.19 @ 08:10) >    Ventricular Rate 72 BPM    Atrial Rate 357 BPM    QRS Duration 84 ms    Q-T Interval 404 ms    QTC Calculation(Bezet) 442 ms    R Axis 92 degrees    T Axis 87 degrees    Diagnosis Line ATRIAL FIBRILLATION  RIGHTWARD AXIS  NONSPECIFIC T WAVE ABNORMALITY , PROBABLY DIGITALIS EFFECT  ABNORMAL ECG    < end of copied text >      Telemetry:  Echo:  < from: TTE with Doppler (w/Cont) (07.06.19 @ 10:06) >    Conclusions: EF 43%  1. The aortic valve leaflets are mildly thickened.  There  is aortic sclerosis but no stenosis. No aortic valve  regurgitation seen.  2. The left ventricular size is normal  3. There is septal hypokineisis.   The LVEF is about 45%.  4. The right ventricle appears moderately dilated with  mildly reduced function.  5. There is no significantmitral or tricuspid  regurgitation.  6. There is no pericardial effusion.    < end of copied text >    Cardiac Cath:  Stress Test:  Imaging:    ASSESSMENT & PLAN:     · Assessment	  70 y/o M w/ pmhx of HTN, HLD, MORRO (on CPAP), recently diagnosed afib (on cardizem and Eliquis) and Gout presented to [place] after having one syncopal episode at home on 6/29.  At [place], Trops negative and pt had ? vtach vs SVT episode (No EKG available) and now transferred to [facility] for possible EPS.     Problem/Plan - 1:  ·  Problem: Syncope.  Plan: s/p syncopal episode  w/ hx of recently diagnosed w/ afib   CT brain done at [place] showed chronic encephalomalacia of frontal lobes No acute changes  currently afib on tele w/ vent rate 'S  - Tele  - EP consult for possible EP STUDY  - Cont. BB   - Eliquis to hep gtt for ischemic work up Monday by Dr. Hudson (7/8).   - 7/6 TTE EF 45%,  There is aortic sclerosis but no stenosis. There is septal hypokinesis.   The right ventricle appears moderately dilated with mildly reduced function.       Problem/Plan - 2:  ·  Problem: COPD (chronic obstructive pulmonary disease).  Plan: Hx of COPD and MORRO  mild expiratory wheezing R>L  Continue home neb tx  - Cont. Advair, proventil and spiriva.      Problem/Plan - 3:  ·  Problem: HTN (hypertension).       FOR FOLLOW UP:  - EP consult for possible EP STUDY  - Continue Eliquis last dose tonight at 10pm  then to hep gtt for 7/7/19 10am for 7/8 [facility]  Monday by Dr. Hudson (7/8).

## 2019-07-06 NOTE — PROGRESS NOTE ADULT - ASSESSMENT
70 y/o M w/ pmhx of HTN, HLD, MORRO (on CPAP), recently diagnosed afib (on cardizem and Eliquis) and Gout presented to [place] after having one syncopal episode at home on 6/29.  At [place], Trops negative and pt had ? vtach vs SVT episode (No EKG available) and now transferred to [facility] for possible EPS.

## 2019-07-07 DIAGNOSIS — N40.0 BENIGN PROSTATIC HYPERPLASIA WITHOUT LOWER URINARY TRACT SYMPTOMS: ICD-10-CM

## 2019-07-07 DIAGNOSIS — E66.09 OTHER OBESITY DUE TO EXCESS CALORIES: ICD-10-CM

## 2019-07-07 DIAGNOSIS — I48.91 UNSPECIFIED ATRIAL FIBRILLATION: ICD-10-CM

## 2019-07-07 DIAGNOSIS — I10 ESSENTIAL (PRIMARY) HYPERTENSION: ICD-10-CM

## 2019-07-07 DIAGNOSIS — E78.5 HYPERLIPIDEMIA, UNSPECIFIED: ICD-10-CM

## 2019-07-07 DIAGNOSIS — G47.33 OBSTRUCTIVE SLEEP APNEA (ADULT) (PEDIATRIC): ICD-10-CM

## 2019-07-07 DIAGNOSIS — I50.20 UNSPECIFIED SYSTOLIC (CONGESTIVE) HEART FAILURE: ICD-10-CM

## 2019-07-07 LAB
ANION GAP SERPL CALC-SCNC: 9 MMOL/L — SIGNIFICANT CHANGE UP (ref 5–17)
APTT BLD: 124.8 SEC — CRITICAL HIGH (ref 27.5–36.3)
APTT BLD: 138.1 SEC — CRITICAL HIGH (ref 27.5–36.3)
BASOPHILS # BLD AUTO: 0.05 K/UL — SIGNIFICANT CHANGE UP (ref 0–0.2)
BASOPHILS NFR BLD AUTO: 0.5 % — SIGNIFICANT CHANGE UP (ref 0–2)
BUN SERPL-MCNC: 29 MG/DL — HIGH (ref 7–23)
CALCIUM SERPL-MCNC: 8.5 MG/DL — SIGNIFICANT CHANGE UP (ref 8.4–10.5)
CHLORIDE SERPL-SCNC: 99 MMOL/L — SIGNIFICANT CHANGE UP (ref 96–108)
CO2 SERPL-SCNC: 30 MMOL/L — SIGNIFICANT CHANGE UP (ref 22–31)
CREAT SERPL-MCNC: 0.92 MG/DL — SIGNIFICANT CHANGE UP (ref 0.5–1.3)
EOSINOPHIL # BLD AUTO: 0.59 K/UL — HIGH (ref 0–0.5)
EOSINOPHIL NFR BLD AUTO: 5.6 % — SIGNIFICANT CHANGE UP (ref 0–6)
GLUCOSE SERPL-MCNC: 82 MG/DL — SIGNIFICANT CHANGE UP (ref 70–99)
HCT VFR BLD CALC: 38.5 % — LOW (ref 39–50)
HCT VFR BLD CALC: 45.2 % — SIGNIFICANT CHANGE UP (ref 39–50)
HGB BLD-MCNC: 11.8 G/DL — LOW (ref 13–17)
HGB BLD-MCNC: 14.6 G/DL — SIGNIFICANT CHANGE UP (ref 13–17)
IMM GRANULOCYTES NFR BLD AUTO: 0.8 % — SIGNIFICANT CHANGE UP (ref 0–1.5)
LYMPHOCYTES # BLD AUTO: 2.56 K/UL — SIGNIFICANT CHANGE UP (ref 1–3.3)
LYMPHOCYTES # BLD AUTO: 24.4 % — SIGNIFICANT CHANGE UP (ref 13–44)
MAGNESIUM SERPL-MCNC: 2 MG/DL — SIGNIFICANT CHANGE UP (ref 1.6–2.6)
MCHC RBC-ENTMCNC: 25.4 PG — LOW (ref 27–34)
MCHC RBC-ENTMCNC: 26.2 PG — LOW (ref 27–34)
MCHC RBC-ENTMCNC: 30.6 GM/DL — LOW (ref 32–36)
MCHC RBC-ENTMCNC: 32.2 GM/DL — SIGNIFICANT CHANGE UP (ref 32–36)
MCV RBC AUTO: 81.4 FL — SIGNIFICANT CHANGE UP (ref 80–100)
MCV RBC AUTO: 82.8 FL — SIGNIFICANT CHANGE UP (ref 80–100)
MONOCYTES # BLD AUTO: 0.94 K/UL — HIGH (ref 0–0.9)
MONOCYTES NFR BLD AUTO: 9 % — SIGNIFICANT CHANGE UP (ref 2–14)
NEUTROPHILS # BLD AUTO: 6.28 K/UL — SIGNIFICANT CHANGE UP (ref 1.8–7.4)
NEUTROPHILS NFR BLD AUTO: 59.7 % — SIGNIFICANT CHANGE UP (ref 43–77)
PHOSPHATE SERPL-MCNC: 2.7 MG/DL — SIGNIFICANT CHANGE UP (ref 2.5–4.5)
PLATELET # BLD AUTO: 326 K/UL — SIGNIFICANT CHANGE UP (ref 150–400)
PLATELET # BLD AUTO: 361 K/UL — SIGNIFICANT CHANGE UP (ref 150–400)
POTASSIUM SERPL-MCNC: 4.2 MMOL/L — SIGNIFICANT CHANGE UP (ref 3.5–5.3)
POTASSIUM SERPL-SCNC: 4.2 MMOL/L — SIGNIFICANT CHANGE UP (ref 3.5–5.3)
RBC # BLD: 4.65 M/UL — SIGNIFICANT CHANGE UP (ref 4.2–5.8)
RBC # BLD: 5.55 M/UL — SIGNIFICANT CHANGE UP (ref 4.2–5.8)
RBC # FLD: 13.6 % — SIGNIFICANT CHANGE UP (ref 10.3–14.5)
RBC # FLD: 14.3 % — SIGNIFICANT CHANGE UP (ref 10.3–14.5)
SODIUM SERPL-SCNC: 138 MMOL/L — SIGNIFICANT CHANGE UP (ref 135–145)
WBC # BLD: 10.5 K/UL — SIGNIFICANT CHANGE UP (ref 3.8–10.5)
WBC # BLD: 12.1 K/UL — HIGH (ref 3.8–10.5)
WBC # FLD AUTO: 10.5 K/UL — SIGNIFICANT CHANGE UP (ref 3.8–10.5)
WBC # FLD AUTO: 12.1 K/UL — HIGH (ref 3.8–10.5)

## 2019-07-07 PROCEDURE — 99232 SBSQ HOSP IP/OBS MODERATE 35: CPT

## 2019-07-07 PROCEDURE — 99233 SBSQ HOSP IP/OBS HIGH 50: CPT

## 2019-07-07 RX ORDER — HEPARIN SODIUM 5000 [USP'U]/ML
10000 INJECTION INTRAVENOUS; SUBCUTANEOUS EVERY 6 HOURS
Refills: 0 | Status: DISCONTINUED | OUTPATIENT
Start: 2019-07-07 | End: 2019-07-08

## 2019-07-07 RX ORDER — AMLODIPINE BESYLATE 2.5 MG/1
5 TABLET ORAL DAILY
Refills: 0 | Status: DISCONTINUED | OUTPATIENT
Start: 2019-07-08 | End: 2019-07-11

## 2019-07-07 RX ORDER — HEPARIN SODIUM 5000 [USP'U]/ML
INJECTION INTRAVENOUS; SUBCUTANEOUS
Qty: 25000 | Refills: 0 | Status: DISCONTINUED | OUTPATIENT
Start: 2019-07-07 | End: 2019-07-08

## 2019-07-07 RX ORDER — HEPARIN SODIUM 5000 [USP'U]/ML
5000 INJECTION INTRAVENOUS; SUBCUTANEOUS EVERY 6 HOURS
Refills: 0 | Status: DISCONTINUED | OUTPATIENT
Start: 2019-07-07 | End: 2019-07-08

## 2019-07-07 RX ADMIN — TAMSULOSIN HYDROCHLORIDE 0.8 MILLIGRAM(S): 0.4 CAPSULE ORAL at 21:12

## 2019-07-07 RX ADMIN — Medication 25 MILLIGRAM(S): at 17:06

## 2019-07-07 RX ADMIN — TIOTROPIUM BROMIDE 1 CAPSULE(S): 18 CAPSULE ORAL; RESPIRATORY (INHALATION) at 17:07

## 2019-07-07 RX ADMIN — PANTOPRAZOLE SODIUM 40 MILLIGRAM(S): 20 TABLET, DELAYED RELEASE ORAL at 05:49

## 2019-07-07 RX ADMIN — BUDESONIDE AND FORMOTEROL FUMARATE DIHYDRATE 2 PUFF(S): 160; 4.5 AEROSOL RESPIRATORY (INHALATION) at 17:06

## 2019-07-07 RX ADMIN — AMLODIPINE BESYLATE 10 MILLIGRAM(S): 2.5 TABLET ORAL at 08:54

## 2019-07-07 RX ADMIN — HEPARIN SODIUM 0 UNIT(S)/HR: 5000 INJECTION INTRAVENOUS; SUBCUTANEOUS at 23:58

## 2019-07-07 RX ADMIN — CHLORHEXIDINE GLUCONATE 1 APPLICATION(S): 213 SOLUTION TOPICAL at 13:01

## 2019-07-07 RX ADMIN — FINASTERIDE 5 MILLIGRAM(S): 5 TABLET, FILM COATED ORAL at 12:54

## 2019-07-07 RX ADMIN — Medication 40 MILLIGRAM(S): at 08:54

## 2019-07-07 RX ADMIN — PREGABALIN 1000 MICROGRAM(S): 225 CAPSULE ORAL at 12:54

## 2019-07-07 RX ADMIN — ATORVASTATIN CALCIUM 10 MILLIGRAM(S): 80 TABLET, FILM COATED ORAL at 21:12

## 2019-07-07 RX ADMIN — Medication 25 MILLIGRAM(S): at 08:54

## 2019-07-07 RX ADMIN — BUDESONIDE AND FORMOTEROL FUMARATE DIHYDRATE 2 PUFF(S): 160; 4.5 AEROSOL RESPIRATORY (INHALATION) at 05:48

## 2019-07-07 RX ADMIN — HEPARIN SODIUM 2400 UNIT(S)/HR: 5000 INJECTION INTRAVENOUS; SUBCUTANEOUS at 10:22

## 2019-07-07 RX ADMIN — HEPARIN SODIUM 2100 UNIT(S)/HR: 5000 INJECTION INTRAVENOUS; SUBCUTANEOUS at 17:07

## 2019-07-07 NOTE — PROGRESS NOTE ADULT - ASSESSMENT
Pt is a 70 y/o M w/ a PMHx of HTN, HLD, MORRO (on CPAP), recently diagnosed afib (on cardizem and Eliquis) and gout presented to [place] after having one syncopal episode at home on 6/29.  At [place], Trops negative and pt had ?vtach vs SVT episode (No EKG available) and now transferred to [facility] for cath + possible EPS.     -D/c apixaban, start heparin gtt this AM for cath tomorrow (7/8).  -Continue metoprolol tartrate  -Continue atorvastatin  -Continue lasix 40 mg PO daily, strict I/Os, daily standing weights  -Keep K >4, Mg >2  -Continue amlodipine 10 mg for HTN  -Followed by Dr. Corina Vivar for EP- plan for possible EP study after ischemic workup  -Continue telemetry monitoring    Juventino Lo MD  Cardiology Fellow  All cardiology service information can be found 24/7 on [email or web address], password: [email or web address]

## 2019-07-07 NOTE — PROGRESS NOTE ADULT - ASSESSMENT
68 y/o M w/ a PMHx of HTN, HLD, MORRO (on CPAP), recently diagnosed afib (on cardizem and Eliquis) and gout presented to [place] after having one syncopal episode at home on 6/29.  At [place], Trops negative and pt had ?vtach vs SVT episode (No EKG available) and now transferred to [facility] for cath + possible EPS

## 2019-07-07 NOTE — PROGRESS NOTE ADULT - PROBLEM SELECTOR PLAN 1
Pt had syncopal episode at home on 6/29, was subsequently admitted to [facility] and underwent workup where pt may have had SVT vs VT, unclear as no EKG. Pt transferred to [facility] for further eval Pt had syncopal episode at home on 6/29, was subsequently admitted to [facility] and underwent workup where pt may have had SVT vs VT, unclear as no EKG. Pt transferred to [facility] for further eval initially monitored in CCU and subsequently transferred to [facility]  - Plan for ischemic workup with [facility] and [facility] tomorrow  - EP Dr. Valentin following. Plan for potential EPS after cath  - Monitor on tele  - check orthostatics

## 2019-07-07 NOTE — PROGRESS NOTE ADULT - PROBLEM SELECTOR PLAN 2
A fib on tele. On Rate controll with Metoprolol. A fib on tele. On Rate control with Metoprolol.  - Transitioned from Eliquis to Heparin GGT this am in anticipation for cath tomorrow

## 2019-07-07 NOTE — PROGRESS NOTE ADULT - SUBJECTIVE AND OBJECTIVE BOX
Phillip Alva MD  Division of Hospital Medicine  Pager [phone]  If no response or off hours page: [phone]  ---------------------------------------------------------    ALICE JUNIOR  69y  Male      Patient is a 69y old  Male who presents with a chief complaint of syncopal episode (07 Jul 2019 07:55)      INTERVAL HPI/OVERNIGHT EVENTS:  Seen at [facility], transferred from CCU. Slept well, offers no complaints      REVIEW OF SYSTEMS: 14 point ROS negative unless listed above    T(C): 36.3 (07-07-19 @ 11:18), Max: 36.7 (07-06-19 @ 19:00)  HR: 68 (07-07-19 @ 11:18) (68 - 109)  BP: 104/60 (07-07-19 @ 11:18) (91/50 - 116/74)  RR: 17 (07-07-19 @ 11:18) (15 - 25)  SpO2: 94% (07-07-19 @ 11:18) (85% - 98%)  Wt(kg): --Vital Signs Last 24 Hrs  T(C): 36.3 (07 Jul 2019 11:18), Max: 36.7 (06 Jul 2019 19:00)  T(F): 97.4 (07 Jul 2019 11:18), Max: 98 (06 Jul 2019 19:00)  HR: 68 (07 Jul 2019 11:18) (68 - 109)  BP: 104/60 (07 Jul 2019 11:18) (91/50 - 116/74)  BP(mean): 74 (06 Jul 2019 21:00) (62 - 85)  RR: 17 (07 Jul 2019 11:18) (15 - 25)  SpO2: 94% (07 Jul 2019 11:18) (85% - 98%)    PHYSICAL EXAM:  GENERAL: NAD, well-groomed, well-developed, obese  HEAD:  Atraumatic, Normocephalic  EYES: EOMI, PERRLA, conjunctiva and sclera clear  NECK: Supple, No JVD  CHEST/LUNG: Clear to auscultation bilaterally; No rales, rhonchi, wheezing, or rubs  HEART: Irregular rate and rhythm; No murmurs, rubs, or gallops  ABDOMEN: Soft, Nontender, Nondistended; Bowel sounds present.    EXTREMITIES:  2+ Peripheral Pulses, No clubbing, cyanosis, or edema  PSYCH: Alert & Oriented x3  NERVOUS SYSTEM: Motor Strength 5/5 B/L upper and lower extremities.  Sensory intact    Consultant(s) Notes Reviewed:  [x ] YES  [ ] NO  Care Discussed with Consultants/Other Providers [ x] YES  [ ] NO    LABS:                        11.8   10.50 )-----------( 326      ( 07 Jul 2019 09:08 )             38.5     07-07    138  |  99  |  29<H>  ----------------------------<  82  4.2   |  30  |  0.92    Ca    8.5      07 Jul 2019 06:06  Phos  2.7     07-07  Mg     2.0     07-07    TPro  6.7  /  Alb  3.3  /  TBili  0.2  /  DBili  x   /  AST  54<H>  /  ALT  75<H>  /  AlkPhos  111  07-06    PT/INR - ( 06 Jul 2019 04:31 )   PT: 13.6 sec;   INR: 1.19 ratio         PTT - ( 06 Jul 2019 04:31 )  PTT:28.7 sec    CAPILLARY BLOOD GLUCOSE                RADIOLOGY & ADDITIONAL TESTS:    Imaging Personally Reviewed:  [x ] YES  [ ] NO

## 2019-07-07 NOTE — CHART NOTE - NSCHARTNOTEFT_GEN_A_CORE
Patient is a 69 year old man with history of HTN, HLD, MORRO (on CPAP), recently diagnosed Afib (on Cardizem and Eliquis) and gout initially presented to [place] after having one syncopal episode at home on 6/29.  At [place], trops negative and patient had possible VT vs SVT episode and now transferred to [facility] CCU for possible EPS workup.     In the CCU, EP was consulted. Plan to continue with Metoprolol and transition from Eliquis to Heparin drip on 7/7.     For follow up:  - Follow up EP recs  - Transition to Heparin drip for Afib on the am of 7/7  - Possible LHC on 7/8 by Dr. Tristan Astorga PGY-3  Internal medicine MAR Patient is a 69 year old man with history of HTN, HLD, MORRO (on CPAP), recently diagnosed Afib (on cardizem and Eliquis) and gout initially presented to [place] after having one syncopal episode at home on 6/29.  At [place], trops negative and patient had possible VT vs SVT episode and now transferred to [facility] CCU for possible EPS workup.     In the CCU, EP was consulted. TTE was performed on 7/6 which showed EF 45%, aortic slcerosis but no stenosis, septal hypokinesis, mildly reduced RV function. Plan to continue with Metoprolol and transition from Eliquis to Heparin drip on 7/7.     For follow up:  - Follow up EP recs  - Transition to Heparin drip for Afib on the am of 7/7  - Possible LHC on 7/8 by Dr. Tristan Astorga PGY-3  Internal medicine MAR Patient is a 69 year old man with history of HTN, HLD, MORRO (on CPAP), recently diagnosed Afib (on cardizem and Eliquis) and gout initially presented to [place] after having one syncopal episode at home on 6/29.  At [place], trops negative and patient had possible VT vs SVT episode and now transferred to [facility] CCU for possible EPS workup.     In the CCU, EP was consulted. TTE was performed on 7/6 which showed EF 45%, aortic sclerosis but no stenosis, septal hypokinesis, mildly reduced RV function. Plan to continue with Metoprolol and transition from Eliquis to Heparin drip on 7/7.     Patient was seen on the medicine floor. Currently sleeping comfortably, on BIPAP 10/5 30%. He has no active complaints at this time.     For follow up:  - Follow up EP recs  - Transition to Heparin drip for Afib on the am of 7/7  - Possible LHC on 7/8 by Dr. Tristan Astorga PGY-3  Internal medicine MAR

## 2019-07-07 NOTE — PROGRESS NOTE ADULT - SUBJECTIVE AND OBJECTIVE BOX
Patient seen and examined at bedside.    Overnight Events:   Pt transferred out of CCU2 last night.    Briefly pt is a 70 y/o man with a PMHx of HTN, HLD, MORRO (on CPAP), recently diagnosed afib (on Cardizem and Eliquis) and Gout presented to [place] after having one syncopal episode at home on .  At [place], Trops negative and pt had ?vtach vs SVT episode (no EKG available) and was transferred to [facility] for possible EPS and R/LHC.    Review Of Systems:  CONSTITUTIONAL: No weakness, fevers or chills  EYES/ENT: No visual changes;  No vertigo or throat pain   NECK: No pain or stiffness  RESPIRATORY: No cough, wheezing, hemoptysis; No shortness of breath  CARDIOVASCULAR: No chest pain or palpitations  GASTROINTESTINAL: No abdominal or epigastric pain. No nausea, vomiting, or hematemesis; No diarrhea or constipation. No melena or hematochezia.  GENITOURINARY: No dysuria, frequency or hematuria  NEUROLOGICAL: No numbness or weakness  SKIN: No itching, rashes         Medications:  ALBUTerol    90 MICROgram(s) HFA Inhaler 2 Puff(s) Inhalation every 6 hours PRN  amLODIPine   Tablet 10 milliGRAM(s) Oral daily  apixaban 5 milliGRAM(s) Oral every 12 hours  atorvastatin 10 milliGRAM(s) Oral at bedtime  buDESOnide 160 MICROgram(s)/formoterol 4.5 MICROgram(s) Inhaler 2 Puff(s) Inhalation two times a day  chlorhexidine 2% Cloths 1 Application(s) Topical daily  cyanocobalamin 1000 MICROGram(s) Oral daily  finasteride 5 milliGRAM(s) Oral daily  furosemide    Tablet 40 milliGRAM(s) Oral daily  metoprolol tartrate 25 milliGRAM(s) Oral two times a day  pantoprazole    Tablet 40 milliGRAM(s) Oral before breakfast  tamsulosin 0.8 milliGRAM(s) Oral at bedtime  tiotropium 18 MICROgram(s) Capsule 1 Capsule(s) Inhalation daily      PAST MEDICAL & SURGICAL HISTORY:  COPD (chronic obstructive pulmonary disease)  HTN (hypertension)  Simple chronic bronchitis  Essential hypertension  No significant past surgical history  No significant past surgical history      Vitals:  T(F): 97.3 (-), Max: 98.2 (07-06)  HR: 80 () (70 - 109)  BP: 98/66 () (91/50 - 132/90)  RR: 16 ()  SpO2: 94% ()  I&O's Summary    2019 07:01  -  2019 07:00  --------------------------------------------------------  IN: 800 mL / OUT: 1025 mL / NET: -225 mL        Physical Exam:  Appearance: No acute distress; well appearing  Eyes: PERRL, EOMI, pink conjunctiva  HENT: Normal oral muscosa  Cardiovascular: RRR, S1, S2, no murmurs, rubs, or gallops; no edema; no JVD  Respiratory: Clear to auscultation bilaterally  Gastrointestinal: soft, non-tender, non-distended with normal bowel sounds  Musculoskeletal: No clubbing; no joint deformity   Neurologic: Non-focal  Lymphatic: No lymphadenopathy  Psychiatry: AAOx3, mood & affect appropriate  Skin: No rashes, ecchymoses, or cyanosis                          12.9   11.5  )-----------( 333      ( 2019 04:31 )             39.2     -    138  |  99  |  29<H>  ----------------------------<  82  4.2   |  30  |  0.92    Ca    8.5      2019 06:06  Phos  2.7     -  Mg     2.0     -    TPro  6.7  /  Alb  3.3  /  TBili  0.2  /  DBili  x   /  AST  54<H>  /  ALT  75<H>  /  AlkPhos  111  07-06    PT/INR - ( 2019 04:31 )   PT: 13.6 sec;   INR: 1.19 ratio         PTT - ( 2019 04:31 )  PTT:28.7 sec        Total Cholesterol: 117  LDL: 48  HDL: 55  T        New ECG(s): Personally reviewed    Echo:  < from: Transthoracic Echocardiogram (16 @ 23:58) >  Conclusions:  1. Endocardium not well visualized; grossly normal left  ventricular systolic function.  Endocardial visualization  enhanced with intravenous injection of echo contrast  (Definity).  2. Mild-moderate  diastolic dysfunction.  3. The right ventricle is not well visualized; grossly  normal right ventricular systolic function.  4. Pericardial fat pad seen.  *** No previous Echo exam.    < end of copied text >    Stress Testing: No previous stress test in [place]    Cath: No previous cath in [place]    Interpretation of Telemetry: Afib 80-100s Patient seen and examined at bedside.    Overnight Events:   Pt transferred out of CCU2 last night.    Briefly pt is a 68 y/o man with a PMHx of HTN, HLD, MORRO (on CPAP), recently diagnosed afib (on Cardizem and Eliquis) and Gout presented to [place] after having one syncopal episode at home on .  At [place], Trops negative and pt had ?vtach vs SVT episode (no EKG available) and was transferred to [facility] for possible EPS and R/LHC.    Review Of Systems:  CONSTITUTIONAL: No weakness, fevers or chills  EYES/ENT: No visual changes;  No vertigo or throat pain   NECK: No pain or stiffness  RESPIRATORY: No cough, wheezing, hemoptysis; No shortness of breath  CARDIOVASCULAR: No chest pain or palpitations  GASTROINTESTINAL: No abdominal or epigastric pain. No nausea, vomiting, or hematemesis; No diarrhea or constipation. No melena or hematochezia.  GENITOURINARY: No dysuria, frequency or hematuria  NEUROLOGICAL: No numbness or weakness  SKIN: No itching, rashes         Medications:  ALBUTerol    90 MICROgram(s) HFA Inhaler 2 Puff(s) Inhalation every 6 hours PRN  amLODIPine   Tablet 10 milliGRAM(s) Oral daily  apixaban 5 milliGRAM(s) Oral every 12 hours  atorvastatin 10 milliGRAM(s) Oral at bedtime  buDESOnide 160 MICROgram(s)/formoterol 4.5 MICROgram(s) Inhaler 2 Puff(s) Inhalation two times a day  chlorhexidine 2% Cloths 1 Application(s) Topical daily  cyanocobalamin 1000 MICROGram(s) Oral daily  finasteride 5 milliGRAM(s) Oral daily  furosemide    Tablet 40 milliGRAM(s) Oral daily  metoprolol tartrate 25 milliGRAM(s) Oral two times a day  pantoprazole    Tablet 40 milliGRAM(s) Oral before breakfast  tamsulosin 0.8 milliGRAM(s) Oral at bedtime  tiotropium 18 MICROgram(s) Capsule 1 Capsule(s) Inhalation daily      PAST MEDICAL & SURGICAL HISTORY:  COPD (chronic obstructive pulmonary disease)  HTN (hypertension)  Simple chronic bronchitis  Essential hypertension  No significant past surgical history  No significant past surgical history      Vitals:  T(F): 97.3 (-), Max: 98.2 (07-06)  HR: 80 () (70 - 109)  BP: 98/66 () (91/50 - 132/90)  RR: 16 ()  SpO2: 94% ()  I&O's Summary    2019 07:01  -  2019 07:00  --------------------------------------------------------  IN: 800 mL / OUT: 1025 mL / NET: -225 mL        Physical Exam:  Appearance: No acute distress; well appearing  Eyes: PERRL, EOMI, pink conjunctiva  HENT: Normal oral muscosa  Cardiovascular: RRR, S1, S2, no murmurs, rubs, or gallops; no edema; no JVD  Respiratory: Clear to auscultation bilaterally  Gastrointestinal: soft, non-tender, non-distended with normal bowel sounds  Musculoskeletal: No clubbing; no joint deformity   Neurologic: Non-focal  Lymphatic: No lymphadenopathy  Psychiatry: AAOx3, mood & affect appropriate  Skin: No rashes, ecchymoses, or cyanosis                          12.9   11.5  )-----------( 333      ( 2019 04:31 )             39.2         138  |  99  |  29<H>  ----------------------------<  82  4.2   |  30  |  0.92    Ca    8.5      2019 06:06  Phos  2.7       Mg     2.0         TPro  6.7  /  Alb  3.3  /  TBili  0.2  /  DBili  x   /  AST  54<H>  /  ALT  75<H>  /  AlkPhos  111  07-06    PT/INR - ( 2019 04:31 )   PT: 13.6 sec;   INR: 1.19 ratio         PTT - ( 2019 04:31 )  PTT:28.7 sec        Total Cholesterol: 117  LDL: 48  HDL: 55  T        New ECG(s): Personally reviewed    Echo:  TTE: 2019  ------------------------------------------------------------------------  Dimensions:    Normal Values:  LA:     4.7    2.0 - 4.0 cm  Ao:     3.2    2.0 - 3.8 cm  SEPTUM: 1.4    0.6 - 1.2 cm  PWT:    1.2    0.6 - 1.1 cm  LVIDd:  5.4    3.0 - 5.6 cm  LVIDs:  4.2    1.8 - 4.0 cm  Derived variables:  LVMI: 119 g/m2  RWT: 0.44  EF (Teicholtz): 43 %  Doppler Peak Velocity (m/sec): MV=1.0 AoV=1.3  ------------------------------------------------------------------------  Observations:  Mitral Valve: There is mitral annular calcification. There  is no mitral regurgitation. Peak mitral valve gradient  equals 4 mm Hg, mean transmitral valve gradient equals 1 mm  Hg.  Aortic Valve/Aorta: The aortic valve leaflets are mildly  thickened.  There is aortic sclerosis but no stenosis. Peak  transaortic valve gradient equals 7 mm Hg, mean transaortic  valve gradient equals 3 mm Hg, aortic valve velocity time  integral equals 24 cm, estimated aortic valve area equals  2.3 sqcm. No aortic valve regurgitation seen. Peak left  ventricular outflow tract gradient equals 2 mm Hg, LVOT  velocity time integral equals 13 cm.  Aortic Root: 3.2 cm.  Ascending Aorta: 3.2 cm.  LVOT diameter: 2.4 cm.  Left Atrium: Mildly dilated left atrium.  LA volume index =  39 cc/m2.  Left Ventricle: There is septal hypokineisis.   The LVEF is  about 45%. The left ventricular size is normal  Right Heart: The right atrium is severely dilated.  The  right atrial area= 30cm2. The right ventricle appears  moderately dilated with mildly reduced function.  Minimal  tricuspid regurgitation.  No pulmonic regurgitation.  Pericardium/Pleura: There is no pericardial effusion.  Hemodynamic: Estimated right ventricular systolic pressure  equals 26 mm Hg, assuming right atrial pressure equals 8 mm  Hg, consistent with normal pulmonary pressures.  ------------------------------------------------------------------------  Conclusions:  1. The aortic valve leaflets are mildly thickened.  There  is aortic sclerosis but no stenosis. No aortic valve  regurgitation seen.  2. The left ventricular size is normal  3. There is septal hypokinesis The LVEF is about 45%.  4. The right ventricle appears moderately dilated with  mildly reduced function.  5. There is no significant or tricuspid  regurgitation.  6. There is no pericardial effusion.  ------------------------------------------------------------------------  Confirmed on  2019 - 15:44:18 by Bridget Tavarez M.D.  ------------------------------------------------------------------------      Stress Testing: No previous stress test in [place]    Cath: No previous cath in [place]    Interpretation of Telemetry: Afib 80-100s Patient seen and examined at bedside.    Overnight Events:   Pt transferred out of CCU2 last night.    Briefly pt is a 68 y/o man with a PMHx of HTN, HLD, MORRO (on CPAP), recently diagnosed afib (on Cardizem and Eliquis) and Gout presented to [place] after having one syncopal episode at home on .  At [place], trops negative and pt had ?vtach vs SVT episode (no EKG available) and was transferred to [facility] for possible EPS and R/LHC.    Review Of Systems:  CONSTITUTIONAL: No weakness, fevers or chills  EYES/ENT: No visual changes;  No vertigo or throat pain   NECK: No pain or stiffness  RESPIRATORY: No cough, wheezing, hemoptysis; No shortness of breath  CARDIOVASCULAR: No chest pain or palpitations  GASTROINTESTINAL: No abdominal or epigastric pain. No nausea, vomiting, or hematemesis; No diarrhea or constipation. No melena or hematochezia.  GENITOURINARY: No dysuria, frequency or hematuria  NEUROLOGICAL: No numbness or weakness  SKIN: No itching, rashes         Medications:  ALBUTerol    90 MICROgram(s) HFA Inhaler 2 Puff(s) Inhalation every 6 hours PRN  amLODIPine   Tablet 10 milliGRAM(s) Oral daily  apixaban 5 milliGRAM(s) Oral every 12 hours  atorvastatin 10 milliGRAM(s) Oral at bedtime  buDESOnide 160 MICROgram(s)/formoterol 4.5 MICROgram(s) Inhaler 2 Puff(s) Inhalation two times a day  chlorhexidine 2% Cloths 1 Application(s) Topical daily  cyanocobalamin 1000 MICROGram(s) Oral daily  finasteride 5 milliGRAM(s) Oral daily  furosemide    Tablet 40 milliGRAM(s) Oral daily  metoprolol tartrate 25 milliGRAM(s) Oral two times a day  pantoprazole    Tablet 40 milliGRAM(s) Oral before breakfast  tamsulosin 0.8 milliGRAM(s) Oral at bedtime  tiotropium 18 MICROgram(s) Capsule 1 Capsule(s) Inhalation daily      PAST MEDICAL & SURGICAL HISTORY:  COPD (chronic obstructive pulmonary disease)  HTN (hypertension)  Simple chronic bronchitis  Essential hypertension  No significant past surgical history  No significant past surgical history      Vitals:  T(F): 97.3 (-), Max: 98.2 (07-06)  HR: 80 () (70 - 109)  BP: 98/66 () (91/50 - 132/90)  RR: 16 ()  SpO2: 94% ()  I&O's Summary    2019 07:01  -  2019 07:00  --------------------------------------------------------  IN: 800 mL / OUT: 1025 mL / NET: -225 mL        Physical Exam:  Appearance: No acute distress; well appearing  Eyes: PERRL, EOMI, pink conjunctiva  HENT: Normal oral muscosa  Cardiovascular: RRR, S1, S2, no murmurs, rubs, or gallops; no edema; no JVD  Respiratory: Clear to auscultation bilaterally  Gastrointestinal: soft, non-tender, non-distended with normal bowel sounds  Musculoskeletal: No clubbing; no joint deformity   Neurologic: Non-focal  Lymphatic: No lymphadenopathy  Psychiatry: AAOx3, mood & affect appropriate  Skin: No rashes, ecchymoses, or cyanosis                          12.9   11.5  )-----------( 333      ( 2019 04:31 )             39.2         138  |  99  |  29<H>  ----------------------------<  82  4.2   |  30  |  0.92    Ca    8.5      2019 06:06  Phos  2.7       Mg     2.0         TPro  6.7  /  Alb  3.3  /  TBili  0.2  /  DBili  x   /  AST  54<H>  /  ALT  75<H>  /  AlkPhos  111  07-06    PT/INR - ( 2019 04:31 )   PT: 13.6 sec;   INR: 1.19 ratio         PTT - ( 2019 04:31 )  PTT:28.7 sec        Total Cholesterol: 117  LDL: 48  HDL: 55  T        New ECG(s): Personally reviewed    Echo:  TTE: 2019  ------------------------------------------------------------------------  Dimensions:    Normal Values:  LA:     4.7    2.0 - 4.0 cm  Ao:     3.2    2.0 - 3.8 cm  SEPTUM: 1.4    0.6 - 1.2 cm  PWT:    1.2    0.6 - 1.1 cm  LVIDd:  5.4    3.0 - 5.6 cm  LVIDs:  4.2    1.8 - 4.0 cm  Derived variables:  LVMI: 119 g/m2  RWT: 0.44  EF (Teicholtz): 43 %  Doppler Peak Velocity (m/sec): MV=1.0 AoV=1.3  ------------------------------------------------------------------------  Observations:  Mitral Valve: There is mitral annular calcification. There  is no mitral regurgitation. Peak mitral valve gradient  equals 4 mm Hg, mean transmitral valve gradient equals 1 mm  Hg.  Aortic Valve/Aorta: The aortic valve leaflets are mildly  thickened.  There is aortic sclerosis but no stenosis. Peak  transaortic valve gradient equals 7 mm Hg, mean transaortic  valve gradient equals 3 mm Hg, aortic valve velocity time  integral equals 24 cm, estimated aortic valve area equals  2.3 sqcm. No aortic valve regurgitation seen. Peak left  ventricular outflow tract gradient equals 2 mm Hg, LVOT  velocity time integral equals 13 cm.  Aortic Root: 3.2 cm.  Ascending Aorta: 3.2 cm.  LVOT diameter: 2.4 cm.  Left Atrium: Mildly dilated left atrium.  LA volume index =  39 cc/m2.  Left Ventricle: There is septal hypokineisis.   The LVEF is  about 45%. The left ventricular size is normal  Right Heart: The right atrium is severely dilated.  The  right atrial area= 30cm2. The right ventricle appears  moderately dilated with mildly reduced function.  Minimal  tricuspid regurgitation.  No pulmonic regurgitation.  Pericardium/Pleura: There is no pericardial effusion.  Hemodynamic: Estimated right ventricular systolic pressure  equals 26 mm Hg, assuming right atrial pressure equals 8 mm  Hg, consistent with normal pulmonary pressures.  ------------------------------------------------------------------------  Conclusions:  1. The aortic valve leaflets are mildly thickened.  There  is aortic sclerosis but no stenosis. No aortic valve  regurgitation seen.  2. The left ventricular size is normal  3. There is septal hypokinesis The LVEF is about 45%.  4. The right ventricle appears moderately dilated with  mildly reduced function.  5. There is no significant or tricuspid  regurgitation.  6. There is no pericardial effusion.  ------------------------------------------------------------------------  Confirmed on  2019 - 15:44:18 by Bridget Tavarez M.D.  ------------------------------------------------------------------------      Stress Testing: No previous stress test in [place]    Cath: No previous cath in [place]    Interpretation of Telemetry: Afib 80-100s

## 2019-07-08 LAB
APTT BLD: 67 SEC — HIGH (ref 27.5–36.3)
HCT VFR BLD CALC: 41.4 % — SIGNIFICANT CHANGE UP (ref 39–50)
HGB BLD-MCNC: 12.5 G/DL — LOW (ref 13–17)
MCHC RBC-ENTMCNC: 24.4 PG — LOW (ref 27–34)
MCHC RBC-ENTMCNC: 30.2 GM/DL — LOW (ref 32–36)
MCV RBC AUTO: 80.9 FL — SIGNIFICANT CHANGE UP (ref 80–100)
PLATELET # BLD AUTO: 319 K/UL — SIGNIFICANT CHANGE UP (ref 150–400)
RBC # BLD: 5.12 M/UL — SIGNIFICANT CHANGE UP (ref 4.2–5.8)
RBC # FLD: 14 % — SIGNIFICANT CHANGE UP (ref 10.3–14.5)
WBC # BLD: 10.28 K/UL — SIGNIFICANT CHANGE UP (ref 3.8–10.5)
WBC # FLD AUTO: 10.28 K/UL — SIGNIFICANT CHANGE UP (ref 3.8–10.5)

## 2019-07-08 PROCEDURE — 93454 CORONARY ARTERY ANGIO S&I: CPT | Mod: 26

## 2019-07-08 PROCEDURE — 99233 SBSQ HOSP IP/OBS HIGH 50: CPT

## 2019-07-08 PROCEDURE — 99152 MOD SED SAME PHYS/QHP 5/>YRS: CPT

## 2019-07-08 RX ORDER — HEPARIN SODIUM 5000 [USP'U]/ML
1700 INJECTION INTRAVENOUS; SUBCUTANEOUS
Qty: 25000 | Refills: 0 | Status: DISCONTINUED | OUTPATIENT
Start: 2019-07-08 | End: 2019-07-12

## 2019-07-08 RX ADMIN — BUDESONIDE AND FORMOTEROL FUMARATE DIHYDRATE 2 PUFF(S): 160; 4.5 AEROSOL RESPIRATORY (INHALATION) at 18:05

## 2019-07-08 RX ADMIN — HEPARIN SODIUM 1700 UNIT(S)/HR: 5000 INJECTION INTRAVENOUS; SUBCUTANEOUS at 23:09

## 2019-07-08 RX ADMIN — PREGABALIN 1000 MICROGRAM(S): 225 CAPSULE ORAL at 12:16

## 2019-07-08 RX ADMIN — TAMSULOSIN HYDROCHLORIDE 0.8 MILLIGRAM(S): 0.4 CAPSULE ORAL at 21:03

## 2019-07-08 RX ADMIN — AMLODIPINE BESYLATE 5 MILLIGRAM(S): 2.5 TABLET ORAL at 05:34

## 2019-07-08 RX ADMIN — Medication 40 MILLIGRAM(S): at 05:34

## 2019-07-08 RX ADMIN — HEPARIN SODIUM 1700 UNIT(S)/HR: 5000 INJECTION INTRAVENOUS; SUBCUTANEOUS at 09:38

## 2019-07-08 RX ADMIN — PANTOPRAZOLE SODIUM 40 MILLIGRAM(S): 20 TABLET, DELAYED RELEASE ORAL at 05:34

## 2019-07-08 RX ADMIN — ATORVASTATIN CALCIUM 10 MILLIGRAM(S): 80 TABLET, FILM COATED ORAL at 21:03

## 2019-07-08 RX ADMIN — Medication 25 MILLIGRAM(S): at 18:05

## 2019-07-08 RX ADMIN — Medication 25 MILLIGRAM(S): at 05:34

## 2019-07-08 RX ADMIN — FINASTERIDE 5 MILLIGRAM(S): 5 TABLET, FILM COATED ORAL at 12:16

## 2019-07-08 RX ADMIN — TIOTROPIUM BROMIDE 1 CAPSULE(S): 18 CAPSULE ORAL; RESPIRATORY (INHALATION) at 12:16

## 2019-07-08 RX ADMIN — BUDESONIDE AND FORMOTEROL FUMARATE DIHYDRATE 2 PUFF(S): 160; 4.5 AEROSOL RESPIRATORY (INHALATION) at 05:34

## 2019-07-08 RX ADMIN — HEPARIN SODIUM 1700 UNIT(S)/HR: 5000 INJECTION INTRAVENOUS; SUBCUTANEOUS at 01:01

## 2019-07-08 NOTE — PROGRESS NOTE ADULT - SUBJECTIVE AND OBJECTIVE BOX
Seble Alvarado MD  Attending Physician (Hospitalist)  pager: [phone]    Patient is a 69y old  Male who presents with a chief complaint of syncopal episode (08 Jul 2019 08:56)        SUBJECTIVE / OVERNIGHT EVENTS:  No acute issues overnight. afebrile. denies any complaints.       MEDICATIONS  (STANDING):  amLODIPine   Tablet 5 milliGRAM(s) Oral daily  atorvastatin 10 milliGRAM(s) Oral at bedtime  buDESOnide 160 MICROgram(s)/formoterol 4.5 MICROgram(s) Inhaler 2 Puff(s) Inhalation two times a day  chlorhexidine 2% Cloths 1 Application(s) Topical daily  cyanocobalamin 1000 MICROGram(s) Oral daily  finasteride 5 milliGRAM(s) Oral daily  furosemide    Tablet 40 milliGRAM(s) Oral daily  heparin  Infusion.  Unit(s)/Hr (24 mL/Hr) IV Continuous <Continuous>  metoprolol tartrate 25 milliGRAM(s) Oral two times a day  pantoprazole    Tablet 40 milliGRAM(s) Oral before breakfast  tamsulosin 0.8 milliGRAM(s) Oral at bedtime  tiotropium 18 MICROgram(s) Capsule 1 Capsule(s) Inhalation daily    MEDICATIONS  (PRN):  ALBUTerol    90 MICROgram(s) HFA Inhaler 2 Puff(s) Inhalation every 6 hours PRN Shortness of Breath and/or Wheezing  heparin  Injectable 92219 Unit(s) IV Push every 6 hours PRN For aPTT less than 40  heparin  Injectable 5000 Unit(s) IV Push every 6 hours PRN For aPTT between 40 - 57      Vital Signs Last 24 Hrs  T(C): 36.2 (08 Jul 2019 11:23), Max: 36.9 (07 Jul 2019 21:11)  T(F): 97.2 (08 Jul 2019 11:23), Max: 98.4 (07 Jul 2019 21:11)  HR: 81 (08 Jul 2019 11:23) (70 - 88)  BP: 100/66 (08 Jul 2019 11:23) (93/56 - 118/75)  BP(mean): --  RR: 17 (08 Jul 2019 11:23) (17 - 18)  SpO2: 96% (08 Jul 2019 11:23) (95% - 99%)  CAPILLARY BLOOD GLUCOSE        I&O's Summary    07 Jul 2019 07:01  -  08 Jul 2019 07:00  --------------------------------------------------------  IN: 1035 mL / OUT: 3275 mL / NET: -2240 mL    08 Jul 2019 07:01  -  08 Jul 2019 14:48  --------------------------------------------------------  IN: 360 mL / OUT: 1200 mL / NET: -840 mL          PHYSICAL EXAM  GENERAL: NAD, well-developed  HEAD:  Atraumatic, Normocephalic  EYES: EOMI, conjunctiva and sclera clear  NECK: Supple, No JVD  CHEST/LUNG: Clear to auscultation bilaterally; No wheeze  HEART: irregular; No murmurs, rubs, or gallops  ABDOMEN: Soft, Nontender, Nondistended; Bowel sounds present  EXTREMITIES:  2+ Peripheral Pulses, LE edema  PSYCH: AAOx3  SKIN: No rashes or lesions    LABS:                        12.5   10.28 )-----------( 319      ( 08 Jul 2019 07:38 )             41.4     07-07    138  |  99  |  29<H>  ----------------------------<  82  4.2   |  30  |  0.92    Ca    8.5      07 Jul 2019 06:06  Phos  2.7     07-07  Mg     2.0     07-07      PTT - ( 08 Jul 2019 07:19 )  PTT:67.0 sec            RADIOLOGY & ADDITIONAL TESTS:    Imaging Personally Reviewed:  Consultant(s) Notes Reviewed:    Care Discussed with Consultants/Other Providers:

## 2019-07-08 NOTE — PROGRESS NOTE ADULT - ASSESSMENT
68 y/o M w/ a PMHx of HTN, HLD, MORRO (on CPAP), recently diagnosed afib (on cardizem and Eliquis) and gout presented to [place] after having one syncopal episode at home on 6/29.  At [place], Trops negative and pt had ?vtach vs SVT episode (No EKG available) and now transferred to [facility] for cath + possible EPS

## 2019-07-08 NOTE — PROGRESS NOTE ADULT - ASSESSMENT
Pt is a 68 y/o M w/ a PMHx of HTN, HLD, MORRO (on CPAP), recently diagnosed afib (on cardizem and Eliquis) and gout presented to [place] after having one syncopal episode at home on 6/29.  At [place], Trops negative and pt had ?vtach vs SVT episode (No EKG available) and now transferred to [facility] for cath + possible EPS.     -D/c apixaban, start heparin gtt this AM for cath tomorrow (7/8).  -Continue metoprolol tartrate  -Continue atorvastatin  -Continue lasix 40 mg PO daily, strict I/Os, daily standing weights  -Keep K >4, Mg >2  -Continue amlodipine 10 mg for HTN  -Followed by Dr. Corina Vivar for EP- plan for possible EP study after ischemic workup  -Continue telemetry monitoring    Juventino Lo MD  Cardiology Fellow  All cardiology service information can be found 24/7 on [email or web address], password: [email or web address] Pt is a 70 y/o M w/ a PMHx of HTN, HLD, MORRO (on CPAP), recently diagnosed afib (on cardizem and Eliquis) and gout presented to [place] after having one syncopal episode at home on 6/29.  At [place], trops negative and pt had questionable Vtach vs. SVT (No EKG available) and now transferred to [facility] for cath today + possible EPS.     -D/c apixaban, start heparin gtt this AM for cath today 07/08/2019  -Continue metoprolol tartrate  -Continue atorvastatin  -Continue lasix 40 mg PO daily, strict I/Os, daily standing weights  -Keep K >4, Mg >2  -Continue amlodipine 10 mg for HTN  -Followed by Dr. Corina Vivar for EP- plan for possible EP study after ischemic workup  -Continue telemetry monitoring

## 2019-07-08 NOTE — CHART NOTE - NSCHARTNOTEFT_GEN_A_CORE
[id number]  ALICE JUNIOR    Notified by RN patient with critical lab result of aPTT 138.1 sec. Currently Heparin drip is at 21mL/hour therefore will hold Heparin drip for 1 hour and restart Heparin Drip at 17mL/hour. Repeat PT/aPTT in 6 hours.   No signs of active bleeding noted. Vital signs hemodynamically stable. Per RN, sample was drawn from opposite arm. Discussed with RN to obtain new standing weight. Will continue to closely monitor patient and discuss with primary team in AM.       Vital Signs Last 24 Hrs  T(C): 36.9 (07 Jul 2019 21:11), Max: 36.9 (07 Jul 2019 21:11)  T(F): 98.4 (07 Jul 2019 21:11), Max: 98.4 (07 Jul 2019 21:11)  HR: 88 (07 Jul 2019 23:01) (68 - 88)  BP: 118/75 (07 Jul 2019 23:01) (93/56 - 118/75)  RR: 18 (07 Jul 2019 21:11) (16 - 18)  SpO2: 95% (07 Jul 2019 23:01) (93% - 96%)      PT/INR - ( 06 Jul 2019 04:31 )   PT: 13.6 sec;   INR: 1.19 ratio    PTT - ( 07 Jul 2019 23:17 )  PTT:138.1 sec      Vitaliy Zarate PA-C  Dept of Medicine  [id number]

## 2019-07-08 NOTE — PROGRESS NOTE ADULT - PROBLEM SELECTOR PLAN 2
A fib on tele. On Rate control with Metoprolol.  - Transitioned from Eliquis to Heparin GGT for cath today  - will resume back on eliquis for d/c

## 2019-07-08 NOTE — PROGRESS NOTE ADULT - SUBJECTIVE AND OBJECTIVE BOX
Triny Leachng  Pager #- [phone]    CC: 69 y.o M with PMH of HTN, HLD, MORRO (on CPAP and home oxygen) with recently diagnosed Afib (on Cardizem and Eliquis) who presented to [facility] after syncopal episode.   No acute events overnight. Patient seen and examined at bedside. Pt denies CP, SOB, palpitations, n/v. He is awaiting R/LHC today.       Review Of Systems:  CONSTITUTIONAL: No weakness, fevers or chills  EYES/ENT: No visual changes;  No vertigo or throat pain   NECK: No pain or stiffness  RESPIRATORY: No cough, wheezing, hemoptysis; No shortness of breath  CARDIOVASCULAR: No chest pain or palpitations  GASTROINTESTINAL: No abdominal or epigastric pain. No nausea, vomiting, or hematemesis; No diarrhea or constipation  GENITOURINARY: No dysuria  NEUROLOGICAL: No numbness or weakness  SKIN: No itching, rashes         Medications:  ALBUTerol    90 MICROgram(s) HFA Inhaler 2 Puff(s) Inhalation every 6 hours PRN  amLODIPine   Tablet 10 milliGRAM(s) Oral daily  apixaban 5 milliGRAM(s) Oral every 12 hours  atorvastatin 10 milliGRAM(s) Oral at bedtime  buDESOnide 160 MICROgram(s)/formoterol 4.5 MICROgram(s) Inhaler 2 Puff(s) Inhalation two times a day  chlorhexidine 2% Cloths 1 Application(s) Topical daily  cyanocobalamin 1000 MICROGram(s) Oral daily  finasteride 5 milliGRAM(s) Oral daily  furosemide    Tablet 40 milliGRAM(s) Oral daily  metoprolol tartrate 25 milliGRAM(s) Oral two times a day  pantoprazole    Tablet 40 milliGRAM(s) Oral before breakfast  tamsulosin 0.8 milliGRAM(s) Oral at bedtime  tiotropium 18 MICROgram(s) Capsule 1 Capsule(s) Inhalation daily      PAST MEDICAL & SURGICAL HISTORY:  COPD (chronic obstructive pulmonary disease)  HTN (hypertension)  Simple chronic bronchitis  Essential hypertension    Vitals:  T(F): 97.3 (-), Max: 98.2 (-)  HR: 80 () (70 - 109)  BP: 98/66 () (91/50 - 132/90)  RR: 16 ()  SpO2: 94% ()  I&O's Summary    2019 07:01  -  2019 07:00  --------------------------------------------------------  IN: 800 mL / OUT: 1025 mL / NET: -225 mL        Physical Exam:  Appearance: obese man sleeping comfortably in bed in NAD  HENT: NC/AT, Normal oral muscosa  Cardiovascular: distant heart sounds, RRR, no murmurs, gallops or rubs  Respiratory: poor inspiratory effort, clear to auscultation bilaterally  Gastrointestinal: Soft, NT, ND, BS+  Musculoskeletal: No clubbing; no joint deformity   Neurologic: Non-focal  Lymphatic: No lymphadenopathy  Psychiatry: AAOx3, mood & affect appropriate  Skin: No rashes, ecchymoses, or cyanosis                          12.9   11.5  )-----------( 333      ( 2019 04:31 )             39.2     07-    138  |  99  |  29<H>  ----------------------------<  82  4.2   |  30  |  0.92    Ca    8.5      2019 06:06  Phos  2.7       Mg     2.0     -    TPro  6.7  /  Alb  3.3  /  TBili  0.2  /  DBili  x   /  AST  54<H>  /  ALT  75<H>  /  AlkPhos  111  07-    PT/INR - ( 2019 04:31 )   PT: 13.6 sec;   INR: 1.19 ratio         PTT - ( 2019 04:31 )  PTT:28.7 sec        Total Cholesterol: 117  LDL: 48  HDL: 55  T        New ECG(s): Personally reviewed    Echo:  TTE: 2019  ------------------------------------------------------------------------  Dimensions:    Normal Values:  LA:     4.7    2.0 - 4.0 cm  Ao:     3.2    2.0 - 3.8 cm  SEPTUM: 1.4    0.6 - 1.2 cm  PWT:    1.2    0.6 - 1.1 cm  LVIDd:  5.4    3.0 - 5.6 cm  LVIDs:  4.2    1.8 - 4.0 cm  Derived variables:  LVMI: 119 g/m2  RWT: 0.44  EF (Teicholtz): 43 %  Doppler Peak Velocity (m/sec): MV=1.0 AoV=1.3  ------------------------------------------------------------------------  Observations:  Mitral Valve: There is mitral annular calcification. There  is no mitral regurgitation. Peak mitral valve gradient  equals 4 mm Hg, mean transmitral valve gradient equals 1 mm  Hg.  Aortic Valve/Aorta: The aortic valve leaflets are mildly  thickened.  There is aortic sclerosis but no stenosis. Peak  transaortic valve gradient equals 7 mm Hg, mean transaortic  valve gradient equals 3 mm Hg, aortic valve velocity time  integral equals 24 cm, estimated aortic valve area equals  2.3 sqcm. No aortic valve regurgitation seen. Peak left  ventricular outflow tract gradient equals 2 mm Hg, LVOT  velocity time integral equals 13 cm.  Aortic Root: 3.2 cm.  Ascending Aorta: 3.2 cm.  LVOT diameter: 2.4 cm.  Left Atrium: Mildly dilated left atrium.  LA volume index =  39 cc/m2.  Left Ventricle: There is septal hypokineisis.   The LVEF is  about 45%. The left ventricular size is normal  Right Heart: The right atrium is severely dilated.  The  right atrial area= 30cm2. The right ventricle appears  moderately dilated with mildly reduced function.  Minimal  tricuspid regurgitation.  No pulmonic regurgitation.  Pericardium/Pleura: There is no pericardial effusion.  Hemodynamic: Estimated right ventricular systolic pressure  equals 26 mm Hg, assuming right atrial pressure equals 8 mm  Hg, consistent with normal pulmonary pressures.  ------------------------------------------------------------------------  Conclusions:  1. The aortic valve leaflets are mildly thickened.  There  is aortic sclerosis but no stenosis. No aortic valve  regurgitation seen.  2. The left ventricular size is normal  3. There is septal hypokinesis The LVEF is about 45%.  4. The right ventricle appears moderately dilated with  mildly reduced function.  5. There is no significant or tricuspid  regurgitation.  6. There is no pericardial effusion.  ------------------------------------------------------------------------  Confirmed on  2019 - 15:44:18 by Bridget Tavarez M.D.  ------------------------------------------------------------------------      Stress Testing: No previous stress test in [place]    Cath: No previous cath in [place]    Interpretation of Telemetry: Afib 80-100s Triny Leachng  Pager #- [phone]    CC: 69 y.o M with PMH of HTN, HLD, MORRO (on CPAP and home oxygen) with recently diagnosed Afib (on Cardizem and Eliquis) who presented to [facility] after syncopal episode.     No acute events overnight. Patient seen and examined at bedside. Pt denies CP, SOB, palpitations, n/v. He is awaiting R/LHC today.   Telemetry: PVCs and couplets (80s-100s)      Review Of Systems:  CONSTITUTIONAL: No weakness, fevers or chills  EYES/ENT: No visual changes;  No vertigo or throat pain   NECK: No pain or stiffness  RESPIRATORY: No cough, wheezing, hemoptysis; No shortness of breath  CARDIOVASCULAR: No chest pain or palpitations  GASTROINTESTINAL: No abdominal or epigastric pain. No nausea, vomiting, or hematemesis; No diarrhea or constipation  GENITOURINARY: No dysuria  NEUROLOGICAL: No numbness or weakness  SKIN: No itching, rashes         Medications:  ALBUTerol    90 MICROgram(s) HFA Inhaler 2 Puff(s) Inhalation every 6 hours PRN  amLODIPine   Tablet 10 milliGRAM(s) Oral daily  apixaban 5 milliGRAM(s) Oral every 12 hours  atorvastatin 10 milliGRAM(s) Oral at bedtime  buDESOnide 160 MICROgram(s)/formoterol 4.5 MICROgram(s) Inhaler 2 Puff(s) Inhalation two times a day  chlorhexidine 2% Cloths 1 Application(s) Topical daily  cyanocobalamin 1000 MICROGram(s) Oral daily  finasteride 5 milliGRAM(s) Oral daily  furosemide    Tablet 40 milliGRAM(s) Oral daily  metoprolol tartrate 25 milliGRAM(s) Oral two times a day  pantoprazole    Tablet 40 milliGRAM(s) Oral before breakfast  tamsulosin 0.8 milliGRAM(s) Oral at bedtime  tiotropium 18 MICROgram(s) Capsule 1 Capsule(s) Inhalation daily      PAST MEDICAL & SURGICAL HISTORY:  COPD (chronic obstructive pulmonary disease)  HTN (hypertension)  Simple chronic bronchitis  Essential hypertension    Vitals:  T(F): 97.3 (-), Max: 98.2 (-)  HR: 80 (-) (70 - 109)  BP: 98/66 (-07) (91/50 - 132/90)  RR: 16 ()  SpO2: 94% ()  I&O's Summary    2019 07:01  -  2019 07:00  --------------------------------------------------------  IN: 800 mL / OUT: 1025 mL / NET: -225 mL        Physical Exam:  Appearance: obese man sleeping comfortably in bed in NAD  HENT: NC/AT, Normal oral muscosa  Cardiovascular: distant heart sounds, RRR, no murmurs, gallops or rubs  Respiratory: poor inspiratory effort, clear to auscultation bilaterally  Gastrointestinal: Soft, NT, ND, BS+  Musculoskeletal: No clubbing; no joint deformity   Neurologic: Non-focal  Lymphatic: No lymphadenopathy  Psychiatry: AAOx3, mood & affect appropriate  Skin: No rashes, ecchymoses, or cyanosis               Labs                        12.5   10.28 )-----------( 319      ( 2019 07:38 )             41.4     07-07    138  |  99  |  29<H>  ----------------------------<  82  4.2   |  30  |  0.92    Ca    8.5      2019 06:06  Phos  2.7     07-07  Mg     2.0     07-07      TPro  6.7  /  Alb  3.3  /  TBili  0.2  /  DBili  x   /  AST  54<H>  /  ALT  75<H>  /  AlkPhos  111  07-06    PTT - ( 2019 07:19 )  PTT:67.0 sec  PT/INR - ( 2019 04:31 )   PT: 13.6 sec;   INR: 1.19 ratio    PTT - ( 2019 04:31 )  PTT:28.7 sec        Total Cholesterol: 117  LDL: 48  HDL: 55  T      Echo:  TTE: 2019  ------------------------------------------------------------------------  Dimensions:    Normal Values:  LA:     4.7    2.0 - 4.0 cm  Ao:     3.2    2.0 - 3.8 cm  SEPTUM: 1.4    0.6 - 1.2 cm  PWT:    1.2    0.6 - 1.1 cm  LVIDd:  5.4    3.0 - 5.6 cm  LVIDs:  4.2    1.8 - 4.0 cm  Derived variables:  LVMI: 119 g/m2  RWT: 0.44  EF (Teicholtz): 43 %  Doppler Peak Velocity (m/sec): MV=1.0 AoV=1.3  ------------------------------------------------------------------------  Observations:  Mitral Valve: There is mitral annular calcification. There  is no mitral regurgitation. Peak mitral valve gradient  equals 4 mm Hg, mean transmitral valve gradient equals 1 mm  Hg.  Aortic Valve/Aorta: The aortic valve leaflets are mildly  thickened.  There is aortic sclerosis but no stenosis. Peak  transaortic valve gradient equals 7 mm Hg, mean transaortic  valve gradient equals 3 mm Hg, aortic valve velocity time  integral equals 24 cm, estimated aortic valve area equals  2.3 sqcm. No aortic valve regurgitation seen. Peak left  ventricular outflow tract gradient equals 2 mm Hg, LVOT  velocity time integral equals 13 cm.  Aortic Root: 3.2 cm.  Ascending Aorta: 3.2 cm.  LVOT diameter: 2.4 cm.  Left Atrium: Mildly dilated left atrium.  LA volume index =  39 cc/m2.  Left Ventricle: There is septal hypokineisis.   The LVEF is  about 45%. The left ventricular size is normal  Right Heart: The right atrium is severely dilated.  The  right atrial area= 30cm2. The right ventricle appears  moderately dilated with mildly reduced function.  Minimal  tricuspid regurgitation.  No pulmonic regurgitation.  Pericardium/Pleura: There is no pericardial effusion.  Hemodynamic: Estimated right ventricular systolic pressure  equals 26 mm Hg, assuming right atrial pressure equals 8 mm  Hg, consistent with normal pulmonary pressures.  ------------------------------------------------------------------------  Conclusions:  1. The aortic valve leaflets are mildly thickened.  There  is aortic sclerosis but no stenosis. No aortic valve  regurgitation seen.  2. The left ventricular size is normal  3. There is septal hypokinesis The LVEF is about 45%.  4. The right ventricle appears moderately dilated with  mildly reduced function.  5. There is no significant or tricuspid  regurgitation.  6. There is no pericardial effusion.  ------------------------------------------------------------------------  Confirmed on  2019 - 15:44:18 by Bridget Tavarez M.D.  ------------------------------------------------------------------------      Stress Testing: No previous stress test in [place]    Cath: No previous cath in [place]    Interpretation of Telemetry: PVCs and couplets (80s-100s)

## 2019-07-08 NOTE — PROGRESS NOTE ADULT - PROBLEM SELECTOR PLAN 1
Pt had syncopal episode at home on 6/29, was subsequently admitted to [facility] and underwent workup where pt may have had SVT vs VT, unclear as no EKG. Pt transferred to [facility] for further eval initially monitored in CCU and subsequently transferred to [facility]  - Plan for ischemic workup with LHC and RHC today  - EP Dr. Valentin following. Plan for potential EPS after cath  - Monitor on tele

## 2019-07-09 ENCOUNTER — RX RENEWAL (OUTPATIENT)
Age: 69
End: 2019-07-09

## 2019-07-09 LAB
ALBUMIN SERPL ELPH-MCNC: 3.3 G/DL — SIGNIFICANT CHANGE UP (ref 3.3–5)
ALP SERPL-CCNC: 138 U/L — HIGH (ref 40–120)
ALT FLD-CCNC: 44 U/L — SIGNIFICANT CHANGE UP (ref 10–45)
ANION GAP SERPL CALC-SCNC: 13 MMOL/L — SIGNIFICANT CHANGE UP (ref 5–17)
ANION GAP SERPL CALC-SCNC: 9 MMOL/L — SIGNIFICANT CHANGE UP (ref 5–17)
APTT BLD: 46.3 SEC — HIGH (ref 27.5–36.3)
APTT BLD: 73.8 SEC — HIGH (ref 27.5–36.3)
APTT BLD: 81.4 SEC — HIGH (ref 27.5–36.3)
AST SERPL-CCNC: 21 U/L — SIGNIFICANT CHANGE UP (ref 10–40)
BASE EXCESS BLDV CALC-SCNC: 8.5 MMOL/L — HIGH (ref -2–2)
BILIRUB SERPL-MCNC: 0.4 MG/DL — SIGNIFICANT CHANGE UP (ref 0.2–1.2)
BUN SERPL-MCNC: 19 MG/DL — SIGNIFICANT CHANGE UP (ref 7–23)
BUN SERPL-MCNC: 19 MG/DL — SIGNIFICANT CHANGE UP (ref 7–23)
CALCIUM SERPL-MCNC: 8.9 MG/DL — SIGNIFICANT CHANGE UP (ref 8.4–10.5)
CALCIUM SERPL-MCNC: 8.9 MG/DL — SIGNIFICANT CHANGE UP (ref 8.4–10.5)
CHLORIDE SERPL-SCNC: 91 MMOL/L — LOW (ref 96–108)
CHLORIDE SERPL-SCNC: 91 MMOL/L — LOW (ref 96–108)
CK MB CFR SERPL CALC: 1 NG/ML — SIGNIFICANT CHANGE UP (ref 0–6.7)
CK SERPL-CCNC: 26 U/L — LOW (ref 30–200)
CO2 BLDV-SCNC: 35 MMOL/L — HIGH (ref 22–30)
CO2 SERPL-SCNC: 32 MMOL/L — HIGH (ref 22–31)
CO2 SERPL-SCNC: 34 MMOL/L — HIGH (ref 22–31)
CREAT SERPL-MCNC: 0.84 MG/DL — SIGNIFICANT CHANGE UP (ref 0.5–1.3)
CREAT SERPL-MCNC: 0.91 MG/DL — SIGNIFICANT CHANGE UP (ref 0.5–1.3)
GAS PNL BLDV: SIGNIFICANT CHANGE UP
GAS PNL BLDV: SIGNIFICANT CHANGE UP
GLUCOSE BLDC GLUCOMTR-MCNC: 116 MG/DL — HIGH (ref 70–99)
GLUCOSE SERPL-MCNC: 104 MG/DL — HIGH (ref 70–99)
GLUCOSE SERPL-MCNC: 111 MG/DL — HIGH (ref 70–99)
HCO3 BLDV-SCNC: 34 MMOL/L — HIGH (ref 21–29)
HCT VFR BLD CALC: 40.7 % — SIGNIFICANT CHANGE UP (ref 39–50)
HCT VFR BLD CALC: 42.3 % — SIGNIFICANT CHANGE UP (ref 39–50)
HGB BLD-MCNC: 13.5 G/DL — SIGNIFICANT CHANGE UP (ref 13–17)
HGB BLD-MCNC: 13.7 G/DL — SIGNIFICANT CHANGE UP (ref 13–17)
INR BLD: 1.26 RATIO — HIGH (ref 0.88–1.16)
MAGNESIUM SERPL-MCNC: 1.8 MG/DL — SIGNIFICANT CHANGE UP (ref 1.6–2.6)
MCHC RBC-ENTMCNC: 25.6 PG — LOW (ref 27–34)
MCHC RBC-ENTMCNC: 26.8 PG — LOW (ref 27–34)
MCHC RBC-ENTMCNC: 32 GM/DL — SIGNIFICANT CHANGE UP (ref 32–36)
MCHC RBC-ENTMCNC: 33.6 GM/DL — SIGNIFICANT CHANGE UP (ref 32–36)
MCV RBC AUTO: 79.6 FL — LOW (ref 80–100)
MCV RBC AUTO: 80.2 FL — SIGNIFICANT CHANGE UP (ref 80–100)
PCO2 BLDV: 50 MMHG — SIGNIFICANT CHANGE UP (ref 35–50)
PH BLDV: 7.45 — SIGNIFICANT CHANGE UP (ref 7.35–7.45)
PHOSPHATE SERPL-MCNC: 2.4 MG/DL — LOW (ref 2.5–4.5)
PLATELET # BLD AUTO: 284 K/UL — SIGNIFICANT CHANGE UP (ref 150–400)
PLATELET # BLD AUTO: 295 K/UL — SIGNIFICANT CHANGE UP (ref 150–400)
PO2 BLDV: 75 MMHG — HIGH (ref 25–45)
POTASSIUM SERPL-MCNC: 3.5 MMOL/L — SIGNIFICANT CHANGE UP (ref 3.5–5.3)
POTASSIUM SERPL-MCNC: 3.6 MMOL/L — SIGNIFICANT CHANGE UP (ref 3.5–5.3)
POTASSIUM SERPL-SCNC: 3.5 MMOL/L — SIGNIFICANT CHANGE UP (ref 3.5–5.3)
POTASSIUM SERPL-SCNC: 3.6 MMOL/L — SIGNIFICANT CHANGE UP (ref 3.5–5.3)
PROT SERPL-MCNC: 7.2 G/DL — SIGNIFICANT CHANGE UP (ref 6–8.3)
PROTHROM AB SERPL-ACNC: 14.5 SEC — HIGH (ref 10–12.9)
RBC # BLD: 5.11 M/UL — SIGNIFICANT CHANGE UP (ref 4.2–5.8)
RBC # BLD: 5.27 M/UL — SIGNIFICANT CHANGE UP (ref 4.2–5.8)
RBC # FLD: 13.4 % — SIGNIFICANT CHANGE UP (ref 10.3–14.5)
RBC # FLD: 13.5 % — SIGNIFICANT CHANGE UP (ref 10.3–14.5)
SAO2 % BLDV: 95 % — HIGH (ref 67–88)
SODIUM SERPL-SCNC: 134 MMOL/L — LOW (ref 135–145)
SODIUM SERPL-SCNC: 136 MMOL/L — SIGNIFICANT CHANGE UP (ref 135–145)
TROPONIN T, HIGH SENSITIVITY RESULT: 8 NG/L — SIGNIFICANT CHANGE UP (ref 0–51)
WBC # BLD: 9.5 K/UL — SIGNIFICANT CHANGE UP (ref 3.8–10.5)
WBC # BLD: 9.6 K/UL — SIGNIFICANT CHANGE UP (ref 3.8–10.5)
WBC # FLD AUTO: 9.5 K/UL — SIGNIFICANT CHANGE UP (ref 3.8–10.5)
WBC # FLD AUTO: 9.6 K/UL — SIGNIFICANT CHANGE UP (ref 3.8–10.5)

## 2019-07-09 PROCEDURE — 99233 SBSQ HOSP IP/OBS HIGH 50: CPT

## 2019-07-09 RX ORDER — MAGNESIUM SULFATE 500 MG/ML
2 VIAL (ML) INJECTION ONCE
Refills: 0 | Status: COMPLETED | OUTPATIENT
Start: 2019-07-09 | End: 2019-07-09

## 2019-07-09 RX ORDER — AMIODARONE HYDROCHLORIDE 400 MG/1
400 TABLET ORAL
Refills: 0 | Status: DISCONTINUED | OUTPATIENT
Start: 2019-07-09 | End: 2019-07-10

## 2019-07-09 RX ORDER — SENNA PLUS 8.6 MG/1
2 TABLET ORAL AT BEDTIME
Refills: 0 | Status: DISCONTINUED | OUTPATIENT
Start: 2019-07-09 | End: 2019-07-17

## 2019-07-09 RX ORDER — ALBUTEROL SULFATE 90 UG/1
108 (90 BASE) AEROSOL, METERED RESPIRATORY (INHALATION)
Qty: 3 | Refills: 1 | Status: ACTIVE | COMMUNITY
Start: 2018-07-31 | End: 1900-01-01

## 2019-07-09 RX ORDER — POLYETHYLENE GLYCOL 3350 17 G/17G
17 POWDER, FOR SOLUTION ORAL DAILY
Refills: 0 | Status: DISCONTINUED | OUTPATIENT
Start: 2019-07-09 | End: 2019-07-17

## 2019-07-09 RX ORDER — DOCUSATE SODIUM 100 MG
100 CAPSULE ORAL THREE TIMES A DAY
Refills: 0 | Status: DISCONTINUED | OUTPATIENT
Start: 2019-07-09 | End: 2019-07-17

## 2019-07-09 RX ORDER — POTASSIUM CHLORIDE 20 MEQ
40 PACKET (EA) ORAL ONCE
Refills: 0 | Status: COMPLETED | OUTPATIENT
Start: 2019-07-09 | End: 2019-07-09

## 2019-07-09 RX ADMIN — Medication 40 MILLIEQUIVALENT(S): at 12:04

## 2019-07-09 RX ADMIN — TIOTROPIUM BROMIDE 1 CAPSULE(S): 18 CAPSULE ORAL; RESPIRATORY (INHALATION) at 12:05

## 2019-07-09 RX ADMIN — Medication 50 GRAM(S): at 16:11

## 2019-07-09 RX ADMIN — ATORVASTATIN CALCIUM 10 MILLIGRAM(S): 80 TABLET, FILM COATED ORAL at 21:23

## 2019-07-09 RX ADMIN — PREGABALIN 1000 MICROGRAM(S): 225 CAPSULE ORAL at 12:04

## 2019-07-09 RX ADMIN — Medication 25 MILLIGRAM(S): at 05:07

## 2019-07-09 RX ADMIN — BUDESONIDE AND FORMOTEROL FUMARATE DIHYDRATE 2 PUFF(S): 160; 4.5 AEROSOL RESPIRATORY (INHALATION) at 17:27

## 2019-07-09 RX ADMIN — AMLODIPINE BESYLATE 5 MILLIGRAM(S): 2.5 TABLET ORAL at 05:07

## 2019-07-09 RX ADMIN — TAMSULOSIN HYDROCHLORIDE 0.8 MILLIGRAM(S): 0.4 CAPSULE ORAL at 21:23

## 2019-07-09 RX ADMIN — HEPARIN SODIUM 2000 UNIT(S)/HR: 5000 INJECTION INTRAVENOUS; SUBCUTANEOUS at 13:58

## 2019-07-09 RX ADMIN — BUDESONIDE AND FORMOTEROL FUMARATE DIHYDRATE 2 PUFF(S): 160; 4.5 AEROSOL RESPIRATORY (INHALATION) at 05:07

## 2019-07-09 RX ADMIN — AMIODARONE HYDROCHLORIDE 400 MILLIGRAM(S): 400 TABLET ORAL at 21:23

## 2019-07-09 RX ADMIN — FINASTERIDE 5 MILLIGRAM(S): 5 TABLET, FILM COATED ORAL at 12:04

## 2019-07-09 RX ADMIN — CHLORHEXIDINE GLUCONATE 1 APPLICATION(S): 213 SOLUTION TOPICAL at 12:08

## 2019-07-09 RX ADMIN — Medication 25 MILLIGRAM(S): at 17:27

## 2019-07-09 RX ADMIN — PANTOPRAZOLE SODIUM 40 MILLIGRAM(S): 20 TABLET, DELAYED RELEASE ORAL at 06:53

## 2019-07-09 RX ADMIN — HEPARIN SODIUM 2000 UNIT(S)/HR: 5000 INJECTION INTRAVENOUS; SUBCUTANEOUS at 07:43

## 2019-07-09 RX ADMIN — Medication 40 MILLIGRAM(S): at 05:07

## 2019-07-09 NOTE — PROGRESS NOTE ADULT - PROBLEM SELECTOR PLAN 1
Pt had syncopal episode at home on 6/29, was subsequently admitted to [facility] and underwent workup where pt may have had SVT vs VT, unclear as no EKG. Pt transferred to [facility] for further eval initially monitored in CCU and subsequently transferred to [facility]  - s/p cardiac cath without dz.   - another episode of spontaenous VT today. need EP follow up and likely further EP study. D/w Dr. Vivar EP. [place] followed by Dr. Cox.   - Monitor on tele. keep K>4.0 and Mg>2.0

## 2019-07-09 NOTE — CHART NOTE - NSCHARTNOTEFT_GEN_A_CORE
s/p RRT for V-tach for 3 min, broke itself. NAD, Alert and oriented X3, follows commands, denies chest pain, palpitations, SOB, dizziness. now A.fib, HR 70s on tele. Was asymptomatic while he was having V-tach  as per pt. EP was called, [facility] was called by RRT team, cardiology Dr. Guevara aware of episode. Will follow up with cardiology and EP recommendation. Continue monitor on tele. Potassium and Magnesium supplemented     Nai Irvin NP-C  #[id number]

## 2019-07-09 NOTE — PROGRESS NOTE ADULT - ASSESSMENT
Assessment: Pt is a 70 y/o M w/ a PMHx of HTN, HLD, MORRO (on CPAP), recently diagnosed afib (on cardizem and Eliquis) and gout presented to [place] after having one syncopal episode at home on 6/29.  At [place], trops negative and pt had questionable Vtach vs. SVT (No EKG available) and now transferred to [facility] for cath today + possible EPS.     #Syncopal episode  - Results of cardiac cath was normal  - c/w telemetry monitoring (currently A fib 70s-90s)  - D/C heparin gtt and restart home dose of Eliquis 5mg twice daily  - Continue metoprolol tartrate 25mg twice daily  - Continue atorvastatin 10mg  - Continue amlodipine 5mg  - Continue lasix 40 mg PO daily, strict I/Os, daily standing weights  - Keep K >4, Mg >2  - Followed by Dr. Corina Vivar for EP- plan for possible EP study after ischemic workup    Triny Guevara- PGY-1  Pager# [phone]

## 2019-07-09 NOTE — CONSULT NOTE ADULT - SUBJECTIVE AND OBJECTIVE BOX
HPI:    Patient is a 69 year old male with past medical history of HTN, HLD, COPD, MORRO on CPAP at home, recently diagnosed with AFIB on Eliquis/ Cardizem and Gout initally presented to [place] after having syncopal episode at home, loosing consciousness. Pt transferred to [facility] for further workup. S/P Cardiac cath on 7/8- normal coronary arteries Today, RRT called pt noted to have sustained VT, - 220's lasting approximately 3 minutes, which self- terminated Pt remained asymptomatic during episode denies, lightheadedness, dizziness, chest pain, palpitations or SOB.   House EP consulted for further management on behalf on Dr. Vivar    PCP Dr. Rivera Gong; Dr. Woods Pulmonologist       PAST MEDICAL & SURGICAL HISTORY:  COPD (chronic obstructive pulmonary disease)  HTN (hypertension)  Simple chronic bronchitis  Essential hypertension  No significant past surgical history  No significant past surgical history      ROS:  General: Pt denies recent weight loss/fever/chills  Neurological: denies numbness or  sensation loss  Cardiovascular: denies chest pain/palpitations/leg edema  Respiratory and Thorax: denies SOB/cough/wheezing  Gastrointestinal: denies abdominal pain  Musculoskeletal: denies joint pain or swelling, denies restricted motion  Hematologic: denies abnormal bleeding  	    MEDICATIONS  (STANDING):  amLODIPine   Tablet 5 milliGRAM(s) Oral daily  atorvastatin 10 milliGRAM(s) Oral at bedtime  buDESOnide 160 MICROgram(s)/formoterol 4.5 MICROgram(s) Inhaler 2 Puff(s) Inhalation two times a day  chlorhexidine 2% Cloths 1 Application(s) Topical daily  cyanocobalamin 1000 MICROGram(s) Oral daily  docusate sodium 100 milliGRAM(s) Oral three times a day  finasteride 5 milliGRAM(s) Oral daily  furosemide    Tablet 40 milliGRAM(s) Oral daily  heparin  Infusion. 1700 Unit(s)/Hr (17 mL/Hr) IV Continuous <Continuous>  metoprolol tartrate 25 milliGRAM(s) Oral two times a day  pantoprazole    Tablet 40 milliGRAM(s) Oral before breakfast  polyethylene glycol 3350 17 Gram(s) Oral daily  senna 2 Tablet(s) Oral at bedtime  tamsulosin 0.8 milliGRAM(s) Oral at bedtime  tiotropium 18 MICROgram(s) Capsule 1 Capsule(s) Inhalation daily    MEDICATIONS  (PRN):  ALBUTerol    90 MICROgram(s) HFA Inhaler 2 Puff(s) Inhalation every 6 hours PRN Shortness of Breath and/or Wheezing      Allergies    No Known Allergies    Intolerances        SOCIAL HISTORY:    FAMILY HISTORY:  No pertinent family history in first degree relatives  No pertinent family history in first degree relatives      Vital Signs Last 24 Hrs  T(C): 36.6 (09 Jul 2019 11:50), Max: 37 (09 Jul 2019 03:53)  T(F): 97.9 (09 Jul 2019 11:50), Max: 98.6 (09 Jul 2019 03:53)  HR: 74 (09 Jul 2019 18:40) (66 - 89)  BP: 100/65 (09 Jul 2019 18:28) (98/65 - 120/69)  BP(mean): --  RR: 18 (09 Jul 2019 17:26) (16 - 18)  SpO2: 94% (09 Jul 2019 18:40) (92% - 97%)    Physical Exam:  Neuro: Alert and oriented x 3   Cardiac: S1, S2 RR  Resp: Bilateral lungs sounds clear to ausculation  Extremities: No edema noted.       LABS:                        13.7   9.5   )-----------( 284      ( 09 Jul 2019 12:06 )             40.7     07-09    136  |  91<L>  |  19  ----------------------------<  111<H>  3.5   |  32<H>  |  0.84    Ca    8.9      09 Jul 2019 12:06  Phos  2.4     07-09  Mg     1.8     07-09    TPro  7.2  /  Alb  3.3  /  TBili  0.4  /  DBili  x   /  AST  21  /  ALT  44  /  AlkPhos  138<H>  07-09    PT/INR - ( 09 Jul 2019 12:06 )   PT: 14.5 sec;   INR: 1.26 ratio         PTT - ( 09 Jul 2019 13:35 )  PTT:81.4 sec      RADIOLOGY & ADDITIONAL STUDIES:    < from: TTE with Doppler (w/Cont) (07.06.19 @ 10:06) >  Dimensions:    Normal Values:  LA:     4.7    2.0 - 4.0 cm  Ao:     3.2    2.0 - 3.8 cm  SEPTUM: 1.4    0.6 - 1.2 cm  PWT:    1.2    0.6 - 1.1 cm  LVIDd:  5.4    3.0 - 5.6 cm  LVIDs:  4.2    1.8 - 4.0 cm  Derived variables:  LVMI: 119 g/m2  RWT: 0.44  EF (Teicholtz): 43 %  Doppler Peak Velocity (m/sec): MV=1.0 AoV=1.3  ------------------------------------------------------------------------  Observations:  Mitral Valve: There is mitral annular calcification. There  is no mitral regurgitation. Peak mitral valve gradient  equals 4 mm Hg, mean transmitral valve gradient equals 1 mm  Hg.  Aortic Valve/Aorta: The aortic valve leaflets are mildly  thickened.  There is aortic sclerosis but no stenosis. Peak  transaortic valve gradient equals 7 mm Hg, mean transaortic  valve gradient equals 3 mm Hg, aortic valve velocity time  integral equals 24 cm, estimated aortic valve area equals  2.3 sqcm. No aortic valve regurgitation seen. Peak left  ventricular outflow tract gradient equals 2 mm Hg, LVOT  velocity time integral equals 13 cm.  Aortic Root: 3.2 cm.  Ascending Aorta: 3.2 cm.  LVOT diameter: 2.4 cm.  Left Atrium: Mildly dilated left atrium.  LA volume index =  39 cc/m2.  Left Ventricle: There is septal hypokineisis.   The LVEF is  about 45%. The left ventricular size is normal  Right Heart: The right atrium is severely dilated.  The  right atrial area= 30cm2. The right ventricle appears  moderately dilated with mildly reduced function.  Minimal  tricuspid regurgitation.  No pulmonic regurgitation.  Pericardium/Pleura: There is no pericardial effusion.  Hemodynamic: Estimated right ventricular systolic pressure  equals 26 mm Hg, assuming right atrial pressure equals 8 mm  Hg, consistent with normal pulmonary pressures.  ------------------------------------------------------------------------  Conclusions:  1. The aortic valve leaflets are mildly thickened.  There  is aortic sclerosis but no stenosis. No aortic valve  regurgitation seen.  2. The left ventricular size is normal  3. There is septal hypokineisis.   The LVEF is about 45%.  4. The right ventricle appears moderately dilated with  mildly reduced function.  5. There is no significantmitral or tricuspid  regurgitation.    < end of copied text > HPI:    Patient is a 69 year old male with past medical history of HTN, HLD, COPD, MORRO on CPAP at home, recently diagnosed with AFIB on Eliquis/ Cardizem and Gout initally presented to [place] after having syncopal episode at home in the bathroom post voiding win which he reports loosing consciousness. Pt transferred to [facility] for further workup. S/P Cardiac cath on 7/8- normal coronary arteries Today, RRT called pt noted to have sustained VT, - 220's lasting approximately 3 minutes, which self- terminated Pt remained asymptomatic during episode denies, lightheadedness, dizziness, chest pain, palpitations or SOB.   House EP consulted for further management on behalf on Dr. Vivar    PCP Dr. Rivera Gong; Dr. Woods Pulmonologist       PAST MEDICAL & SURGICAL HISTORY:  COPD (chronic obstructive pulmonary disease)  HTN (hypertension)  Simple chronic bronchitis  Essential hypertension  No significant past surgical history  No significant past surgical history      ROS:  General: Pt denies recent weight loss/fever/chills  Neurological: denies numbness or  sensation loss  Cardiovascular: denies chest pain/palpitations/leg edema  Respiratory and Thorax: denies SOB/cough/wheezing  Gastrointestinal: denies abdominal pain  Musculoskeletal: denies joint pain or swelling, denies restricted motion  Hematologic: denies abnormal bleeding  	    MEDICATIONS  (STANDING):  amLODIPine   Tablet 5 milliGRAM(s) Oral daily  atorvastatin 10 milliGRAM(s) Oral at bedtime  buDESOnide 160 MICROgram(s)/formoterol 4.5 MICROgram(s) Inhaler 2 Puff(s) Inhalation two times a day  chlorhexidine 2% Cloths 1 Application(s) Topical daily  cyanocobalamin 1000 MICROGram(s) Oral daily  docusate sodium 100 milliGRAM(s) Oral three times a day  finasteride 5 milliGRAM(s) Oral daily  furosemide    Tablet 40 milliGRAM(s) Oral daily  heparin  Infusion. 1700 Unit(s)/Hr (17 mL/Hr) IV Continuous <Continuous>  metoprolol tartrate 25 milliGRAM(s) Oral two times a day  pantoprazole    Tablet 40 milliGRAM(s) Oral before breakfast  polyethylene glycol 3350 17 Gram(s) Oral daily  senna 2 Tablet(s) Oral at bedtime  tamsulosin 0.8 milliGRAM(s) Oral at bedtime  tiotropium 18 MICROgram(s) Capsule 1 Capsule(s) Inhalation daily    MEDICATIONS  (PRN):  ALBUTerol    90 MICROgram(s) HFA Inhaler 2 Puff(s) Inhalation every 6 hours PRN Shortness of Breath and/or Wheezing      Allergies    No Known Allergies    Intolerances        SOCIAL HISTORY:    FAMILY HISTORY:  No pertinent family history in first degree relatives  No pertinent family history in first degree relatives      Vital Signs Last 24 Hrs  T(C): 36.6 (09 Jul 2019 11:50), Max: 37 (09 Jul 2019 03:53)  T(F): 97.9 (09 Jul 2019 11:50), Max: 98.6 (09 Jul 2019 03:53)  HR: 74 (09 Jul 2019 18:40) (66 - 89)  BP: 100/65 (09 Jul 2019 18:28) (98/65 - 120/69)  BP(mean): --  RR: 18 (09 Jul 2019 17:26) (16 - 18)  SpO2: 94% (09 Jul 2019 18:40) (92% - 97%)    Physical Exam:  Neuro: Alert and oriented x 3   Cardiac: S1, S2 RR  Resp: Bilateral lungs sounds clear to ausculation  Extremities: No edema noted.       LABS:                        13.7   9.5   )-----------( 284      ( 09 Jul 2019 12:06 )             40.7     07-09    136  |  91<L>  |  19  ----------------------------<  111<H>  3.5   |  32<H>  |  0.84    Ca    8.9      09 Jul 2019 12:06  Phos  2.4     07-09  Mg     1.8     07-09    TPro  7.2  /  Alb  3.3  /  TBili  0.4  /  DBili  x   /  AST  21  /  ALT  44  /  AlkPhos  138<H>  07-09    PT/INR - ( 09 Jul 2019 12:06 )   PT: 14.5 sec;   INR: 1.26 ratio         PTT - ( 09 Jul 2019 13:35 )  PTT:81.4 sec      RADIOLOGY & ADDITIONAL STUDIES:    < from: TTE with Doppler (w/Cont) (07.06.19 @ 10:06) >  Dimensions:    Normal Values:  LA:     4.7    2.0 - 4.0 cm  Ao:     3.2    2.0 - 3.8 cm  SEPTUM: 1.4    0.6 - 1.2 cm  PWT:    1.2    0.6 - 1.1 cm  LVIDd:  5.4    3.0 - 5.6 cm  LVIDs:  4.2    1.8 - 4.0 cm  Derived variables:  LVMI: 119 g/m2  RWT: 0.44  EF (Teicholtz): 43 %  Doppler Peak Velocity (m/sec): MV=1.0 AoV=1.3  ------------------------------------------------------------------------  Observations:  Mitral Valve: There is mitral annular calcification. There  is no mitral regurgitation. Peak mitral valve gradient  equals 4 mm Hg, mean transmitral valve gradient equals 1 mm  Hg.  Aortic Valve/Aorta: The aortic valve leaflets are mildly  thickened.  There is aortic sclerosis but no stenosis. Peak  transaortic valve gradient equals 7 mm Hg, mean transaortic  valve gradient equals 3 mm Hg, aortic valve velocity time  integral equals 24 cm, estimated aortic valve area equals  2.3 sqcm. No aortic valve regurgitation seen. Peak left  ventricular outflow tract gradient equals 2 mm Hg, LVOT  velocity time integral equals 13 cm.  Aortic Root: 3.2 cm.  Ascending Aorta: 3.2 cm.  LVOT diameter: 2.4 cm.  Left Atrium: Mildly dilated left atrium.  LA volume index =  39 cc/m2.  Left Ventricle: There is septal hypokineisis.   The LVEF is  about 45%. The left ventricular size is normal  Right Heart: The right atrium is severely dilated.  The  right atrial area= 30cm2. The right ventricle appears  moderately dilated with mildly reduced function.  Minimal  tricuspid regurgitation.  No pulmonic regurgitation.  Pericardium/Pleura: There is no pericardial effusion.  Hemodynamic: Estimated right ventricular systolic pressure  equals 26 mm Hg, assuming right atrial pressure equals 8 mm  Hg, consistent with normal pulmonary pressures.  ------------------------------------------------------------------------  Conclusions:  1. The aortic valve leaflets are mildly thickened.  There  is aortic sclerosis but no stenosis. No aortic valve  regurgitation seen.  2. The left ventricular size is normal  3. There is septal hypokineisis.   The LVEF is about 45%.  4. The right ventricle appears moderately dilated with  mildly reduced function.  5. There is no significantmitral or tricuspid  regurgitation.    < end of copied text > HPI:    Patient is a 69 year old male with past medical history of HTN, HLD, COPD, MORRO on CPAP at home and home O2, Recently diagnosed with AFIB on Eliquis/ Cardizem and Gout initally presented to [place] after having syncopal episode at home in the bathroom post voiding win which he reports loosing consciousness. While in [facility] on tele, noted to have VT.  Pt transferred to [facility] for further workup. S/P Cardiac cath on 7/8- normal coronary arteries Today, RRT called pt noted to have sustained VT, - 220's lasting approximately 3 minutes, which self- terminated Pt remained asymptomatic during episode denies, lightheadedness, dizziness, chest pain, palpitations or SOB.   House EP consulted for further management on behalf on Dr. Vivar    PCP Dr. Rivera Gong; Dr. Woods Pulmonologist       PAST MEDICAL & SURGICAL HISTORY:  COPD (chronic obstructive pulmonary disease)  HTN (hypertension)  Simple chronic bronchitis  Essential hypertension  No significant past surgical history  No significant past surgical history      NO chest pain, SOB, CALDERON, PND, orthopnea, palpitations, diaphoresis, lightheadedness, dizziness, syncope, increased lowr extremity edema, fever chills, malaise, myalgias, anorexia, weight changes ( loss or gain), nightsweats, generalized fatigue abdominal pain, N/V/C/D BRBPR, melena, urinary symptoms, cough, and wheezing.  Health Care Proxy=  Primary Care doctor=:  General: Pt denies recent weight loss/fever/chills  Neurological: denies numbness or  sensation loss  Cardiovascular: denies chest pain/palpitations/leg edema  Respiratory and Thorax: denies SOB/cough/wheezing  Gastrointestinal: denies abdominal pain  Musculoskeletal: denies joint pain or swelling, denies restricted motion  Hematologic: denies abnormal bleeding  	    MEDICATIONS  (STANDING):  amLODIPine   Tablet 5 milliGRAM(s) Oral daily  atorvastatin 10 milliGRAM(s) Oral at bedtime  buDESOnide 160 MICROgram(s)/formoterol 4.5 MICROgram(s) Inhaler 2 Puff(s) Inhalation two times a day  chlorhexidine 2% Cloths 1 Application(s) Topical daily  cyanocobalamin 1000 MICROGram(s) Oral daily  docusate sodium 100 milliGRAM(s) Oral three times a day  finasteride 5 milliGRAM(s) Oral daily  furosemide    Tablet 40 milliGRAM(s) Oral daily  heparin  Infusion. 1700 Unit(s)/Hr (17 mL/Hr) IV Continuous <Continuous>  metoprolol tartrate 25 milliGRAM(s) Oral two times a day  pantoprazole    Tablet 40 milliGRAM(s) Oral before breakfast  polyethylene glycol 3350 17 Gram(s) Oral daily  senna 2 Tablet(s) Oral at bedtime  tamsulosin 0.8 milliGRAM(s) Oral at bedtime  tiotropium 18 MICROgram(s) Capsule 1 Capsule(s) Inhalation daily    MEDICATIONS  (PRN):  ALBUTerol    90 MICROgram(s) HFA Inhaler 2 Puff(s) Inhalation every 6 hours PRN Shortness of Breath and/or Wheezing      Allergies    No Known Allergies    Intolerances        SOCIAL HISTORY:    FAMILY HISTORY:  No pertinent family history in first degree relatives  No pertinent family history in first degree relatives      Vital Signs Last 24 Hrs  T(C): 36.6 (09 Jul 2019 11:50), Max: 37 (09 Jul 2019 03:53)  T(F): 97.9 (09 Jul 2019 11:50), Max: 98.6 (09 Jul 2019 03:53)  HR: 74 (09 Jul 2019 18:40) (66 - 89)  BP: 100/65 (09 Jul 2019 18:28) (98/65 - 120/69)  BP(mean): --  RR: 18 (09 Jul 2019 17:26) (16 - 18)  SpO2: 94% (09 Jul 2019 18:40) (92% - 97%)    Physical Exam:  Neuro: Alert and oriented x 3   Cardiac: S1, S2 RR  Resp: Bilateral lungs sounds clear to ausculation  Extremities: No edema noted.       LABS:                        13.7   9.5   )-----------( 284      ( 09 Jul 2019 12:06 )             40.7     07-09    136  |  91<L>  |  19  ----------------------------<  111<H>  3.5   |  32<H>  |  0.84    Ca    8.9      09 Jul 2019 12:06  Phos  2.4     07-09  Mg     1.8     07-09    TPro  7.2  /  Alb  3.3  /  TBili  0.4  /  DBili  x   /  AST  21  /  ALT  44  /  AlkPhos  138<H>  07-09    PT/INR - ( 09 Jul 2019 12:06 )   PT: 14.5 sec;   INR: 1.26 ratio         PTT - ( 09 Jul 2019 13:35 )  PTT:81.4 sec      RADIOLOGY & ADDITIONAL STUDIES:    < from: TTE with Doppler (w/Cont) (07.06.19 @ 10:06) >  Dimensions:    Normal Values:  LA:     4.7    2.0 - 4.0 cm  Ao:     3.2    2.0 - 3.8 cm  SEPTUM: 1.4    0.6 - 1.2 cm  PWT:    1.2    0.6 - 1.1 cm  LVIDd:  5.4    3.0 - 5.6 cm  LVIDs:  4.2    1.8 - 4.0 cm  Derived variables:  LVMI: 119 g/m2  RWT: 0.44  EF (Teicholtz): 43 %  Doppler Peak Velocity (m/sec): MV=1.0 AoV=1.3  ------------------------------------------------------------------------  Observations:  Mitral Valve: There is mitral annular calcification. There  is no mitral regurgitation. Peak mitral valve gradient  equals 4 mm Hg, mean transmitral valve gradient equals 1 mm  Hg.  Aortic Valve/Aorta: The aortic valve leaflets are mildly  thickened.  There is aortic sclerosis but no stenosis. Peak  transaortic valve gradient equals 7 mm Hg, mean transaortic  valve gradient equals 3 mm Hg, aortic valve velocity time  integral equals 24 cm, estimated aortic valve area equals  2.3 sqcm. No aortic valve regurgitation seen. Peak left  ventricular outflow tract gradient equals 2 mm Hg, LVOT  velocity time integral equals 13 cm.  Aortic Root: 3.2 cm.  Ascending Aorta: 3.2 cm.  LVOT diameter: 2.4 cm.  Left Atrium: Mildly dilated left atrium.  LA volume index =  39 cc/m2.  Left Ventricle: There is septal hypokineisis.   The LVEF is  about 45%. The left ventricular size is normal  Right Heart: The right atrium is severely dilated.  The  right atrial area= 30cm2. The right ventricle appears  moderately dilated with mildly reduced function.  Minimal  tricuspid regurgitation.  No pulmonic regurgitation.  Pericardium/Pleura: There is no pericardial effusion.  Hemodynamic: Estimated right ventricular systolic pressure  equals 26 mm Hg, assuming right atrial pressure equals 8 mm  Hg, consistent with normal pulmonary pressures.  ------------------------------------------------------------------------  Conclusions:  1. The aortic valve leaflets are mildly thickened.  There  is aortic sclerosis but no stenosis. No aortic valve  regurgitation seen.  2. The left ventricular size is normal  3. There is septal hypokineisis.   The LVEF is about 45%.  4. The right ventricle appears moderately dilated with  mildly reduced function.  5. There is no significantmitral or tricuspid  regurgitation.    < end of copied text >

## 2019-07-09 NOTE — PROGRESS NOTE ADULT - SUBJECTIVE AND OBJECTIVE BOX
Triny Guevara  Pager #- [phone]    CC: 69 y.o M with PMH of HTN, HLD, MORRO (on CPAP and home oxygen) with recently diagnosed Afib (on Cardizem and Eliquis) who presented to [facility] after syncopal episode.     No acute events overnight. Patient seen and examined at bedside. He continues to deny CP, SOB, dizziness/lightheadedness, N/V. He currently feels well after cardiac catheterization yesterday 19.  Telemetry: A fib (70s-90s)        Medications:  ALBUTerol    90 MICROgram(s) HFA Inhaler 2 Puff(s) Inhalation every 6 hours PRN  amLODIPine   Tablet 10 milliGRAM(s) Oral daily  apixaban 5 milliGRAM(s) Oral every 12 hours  atorvastatin 10 milliGRAM(s) Oral at bedtime  buDESOnide 160 MICROgram(s)/formoterol 4.5 MICROgram(s) Inhaler 2 Puff(s) Inhalation two times a day  chlorhexidine 2% Cloths 1 Application(s) Topical daily  cyanocobalamin 1000 MICROGram(s) Oral daily  finasteride 5 milliGRAM(s) Oral daily  furosemide    Tablet 40 milliGRAM(s) Oral daily  metoprolol tartrate 25 milliGRAM(s) Oral two times a day  pantoprazole    Tablet 40 milliGRAM(s) Oral before breakfast  tamsulosin 0.8 milliGRAM(s) Oral at bedtime  tiotropium 18 MICROgram(s) Capsule 1 Capsule(s) Inhalation daily      Vitals:  Vital Signs Last 24 Hrs  T(C): 37 (2019 03:53), Max: 37 (2019 03:53)  T(F): 98.6 (2019 03:53), Max: 98.6 (2019 03:53)  HR: 73 (2019 09:21) (66 - 103)  BP: 104/72 (2019 03:53) (100/66 - 143/77)  BP(mean): --  RR: 16 (2019 03:53) (16 - 18)  SpO2: 92% (2019 09:21) (92% - 98%)    I&O's Summary    2019 07:01  -  2019 07:00  --------------------------------------------------------  IN: 360 mL / OUT: 2050 mL / NET: -1690 mL    2019 07:01  -  2019 10:14  --------------------------------------------------------  IN: 300 mL / OUT: 0 mL / NET: 300 mL        Physical Exam:  Appearance: obese man sleeping comfortably in bed in NAD  HENT: NC/AT, Normal oral muscosa  Cardiovascular: distant heart sounds, RRR, no murmurs, gallops or rubs  Respiratory: poor inspiratory effort, clear to auscultation bilaterally  Gastrointestinal: Soft, NT, ND, BS+  Musculoskeletal: No clubbing; no joint deformity   Neurologic: Non-focal  Lymphatic: No lymphadenopathy  Psychiatry: AAOx3, mood & affect appropriate  Skin: No rashes, ecchymoses, or cyanosis               Labs                                   13.5   9.6   )-----------( 295      ( 2019 06:35 )             42.3     07-09    134<L>  |  91<L>  |  19  ----------------------------<  104<H>  3.6   |  34<H>  |  0.91    Ca    8.9      2019 06:35      TPro  6.7  /  Alb  3.3  /  TBili  0.2  /  DBili  x   /  AST  54<H>  /  ALT  75<H>  /  AlkPhos  111  07-06    PTT - ( 2019 07:19 )  PTT:67.0 sec  PT/INR - ( 2019 04:31 )   PT: 13.6 sec;   INR: 1.19 ratio    PTT - ( 2019 04:31 )  PTT:28.7 sec      Total Cholesterol: 117  LDL: 48  HDL: 55  T      Echo:  TTE: 2019  ------------------------------------------------------------------------  Dimensions:    Normal Values:  LA:     4.7    2.0 - 4.0 cm  Ao:     3.2    2.0 - 3.8 cm  SEPTUM: 1.4    0.6 - 1.2 cm  PWT:    1.2    0.6 - 1.1 cm  LVIDd:  5.4    3.0 - 5.6 cm  LVIDs:  4.2    1.8 - 4.0 cm  Derived variables:  LVMI: 119 g/m2  RWT: 0.44  EF (Teicholtz): 43 %  Doppler Peak Velocity (m/sec): MV=1.0 AoV=1.3  ------------------------------------------------------------------------  Observations:  Mitral Valve: There is mitral annular calcification. There  is no mitral regurgitation. Peak mitral valve gradient  equals 4 mm Hg, mean transmitral valve gradient equals 1 mm  Hg.  Aortic Valve/Aorta: The aortic valve leaflets are mildly  thickened.  There is aortic sclerosis but no stenosis. Peak  transaortic valve gradient equals 7 mm Hg, mean transaortic  valve gradient equals 3 mm Hg, aortic valve velocity time  integral equals 24 cm, estimated aortic valve area equals  2.3 sqcm. No aortic valve regurgitation seen. Peak left  ventricular outflow tract gradient equals 2 mm Hg, LVOT  velocity time integral equals 13 cm.  Aortic Root: 3.2 cm.  Ascending Aorta: 3.2 cm.  LVOT diameter: 2.4 cm.  Left Atrium: Mildly dilated left atrium.  LA volume index =  39 cc/m2.  Left Ventricle: There is septal hypokineisis.   The LVEF is  about 45%. The left ventricular size is normal  Right Heart: The right atrium is severely dilated.  The  right atrial area= 30cm2. The right ventricle appears  moderately dilated with mildly reduced function.  Minimal  tricuspid regurgitation.  No pulmonic regurgitation.  Pericardium/Pleura: There is no pericardial effusion.  Hemodynamic: Estimated right ventricular systolic pressure  equals 26 mm Hg, assuming right atrial pressure equals 8 mm  Hg, consistent with normal pulmonary pressures.  ------------------------------------------------------------------------  Conclusions:  1. The aortic valve leaflets are mildly thickened.  There  is aortic sclerosis but no stenosis. No aortic valve  regurgitation seen.  2. The left ventricular size is normal  3. There is septal hypokinesis The LVEF is about 45%.  4. The right ventricle appears moderately dilated with  mildly reduced function.  5. There is no significant or tricuspid  regurgitation.  6. There is no pericardial effusion.  ------------------------------------------------------------------------  Confirmed on  2019 - 15:44:18 by Bridget Tavarez M.D.  ------------------------------------------------------------------------    Cath:   PROCEDURE:  --  Left coronary angiography.  --  Right coronary angiography.  --  Sonosite - Diagnostic.  --  Hemostasis with Angioseal.  TECHNIQUE: The risks and alternatives of the procedures and conscious  sedation were explained to the patient and informed consent was obtained.  Cardiac catheterization performed electively.  Local anesthetic given. Right femoral artery access. Left coronary artery  angiography. The vessel was injected utilizing a catheter. Right coronary  artery angiography. The vessel was injected utilizing a catheter. Sonosite  - Diagnostic. Hemostasis with Angioseal. RADIATION EXPOSURE: 2.1 min.  CONTRAST GIVEN: Omnipaque 36 ml.  MEDICATIONS GIVEN: Midazolam, 1 mg, IV. Fentanyl, 25 mcg, IV.  CORONARY VESSELS: The coronary circulation is left dominant.  LM:   --  LM: Normal.  LAD:   --  LAD: Normal.  CX:   --  Circumflex: Normal.  RCA:   --  RCA: Normal.  COMPLICATIONS: There were no complications.  DIAGNOSTIC RECOMMENDATIONS: The patient should continue with the present  medications.

## 2019-07-09 NOTE — PROGRESS NOTE ADULT - PROBLEM SELECTOR PLAN 2
A fib on tele. On Rate control with Metoprolol.  - cont with hep gtt for now until EP follow up.   - will resume back on eliquis for d/c

## 2019-07-09 NOTE — RAPID RESPONSE TEAM SUMMARY - NSSITUATIONBACKGROUNDRRT_GEN_ALL_CORE
70 y/o M w/ PMH of HTN, HLD, MORRO (on CPAP and home oxygen) with recently diagnosed Afib (on Cardizem and Eliquis) p/w syncope at [facility] on 6/29 and transferred to [facility] CCU on 7/5 for potential cath and EP eval. Trops negative and possible Vtach vs SVT at [place]. TTE 7/6 showed EF 45%, septal hypokinesis, mild reduced RV fxn. Transitioned from eliquis to hep gtt on 7/7 and had a LHC on 7/8 that showed all normal coronaries and no complications of the LHC. RRT was called for Vtach in the 200s that lasted for 3 mins on tele and had broke spontaneously without any interventions or medications and went back to Afib in the 70s. Pt was asymptomatic and normotensive throughout the RRT. EP, CCU and Cardio were called back to re-evaluate the patient. Held off Amio for now and labs were ordered    To Do:  [ ] f/u Mg and P that were added on, otherwise Keep K >4 and Mg >2  [ ] consider Amio or Dig if persistent Afib and Amio if Vtach again, may need electrical conversion if a repeat episode occurs  [ ] f/u CCU/EP/Cardio recs  [ ] monitor tele events

## 2019-07-09 NOTE — CHART NOTE - NSCHARTNOTEFT_GEN_A_CORE
Triny Guevara-PGY1  Pager# [phone]    RRT was called around 11:30am for VT (rate of 200-215) for about 3 minutes on telemetry. Patient evaluated at bedside. He states that he was completely asymptomatic- denies CP, SOB, palpitations, dizziness/lightheadedness, n/v. EP was consulted. Cardiology will continue to follow.    Vitals:  T(C): 36.6 (09 Jul 2019 11:50), Max: 37 (09 Jul 2019 03:53)  T(F): 97.9 (09 Jul 2019 11:50), Max: 98.6 (09 Jul 2019 03:53)  HR: 73 (09 Jul 2019 11:50) (66 - 103)  BP: 110/75 (09 Jul 2019 11:50) (98/65 - 143/77)  RR: 18 (09 Jul 2019 11:50) (16 - 18)  SpO2: 92% (09 Jul 2019 11:50) (92% - 98%)

## 2019-07-09 NOTE — PROGRESS NOTE ADULT - ASSESSMENT
68 y/o M w/ a PMHx of HTN, HLD, MORRO (on CPAP), recently diagnosed afib (on cardizem and Eliquis) and gout presented to [place] after having one syncopal episode at home on 6/29.  At [place], Trops negative and pt had ?vtach vs SVT episode (No EKG available) and now transferred to [facility] for cath + possible EPS

## 2019-07-09 NOTE — CONSULT NOTE ADULT - ASSESSMENT
69 year old male with past medical history of HTN, HLD, COPD, MORRO on CPAP at home, recently diagnosed with AFIB on Eliquis/ Cardizem and Gout initally presented to [facility] after having syncopal episode at home, loosing consciousness. Pt transferred to [facility] for further workup. S/P Cardiac cath on 7/8- normal coronary arteries Today, RRT called pt noted to have sustained VT, - 220's lasting approximately 3 minutes, which self- terminated Pt remained asymptomatic during episode denies, lightheadedness, dizziness, chest pain, palpitations or SOB.   [facility] EP consulted for further management on behalf on Dr. Vivar.    # AFIB  # Ventricular Tachycardia  - ECHO: EF 45%, LA volume index 39   - Tele: AFIB HR 70- 90's, Sustained VT lasting 3 minutes 200- 220bpm self- terminated  - Continue with Heparin drip with transition back to Eliquis for AC  - Continue with Lopressor 25mg BID  - Start Amiodarone load 400mg x 7 days, then 400mg daily x 4 weeks, then 200mg daily. Black box warning explained to patient, advised for periodic LFT, TFT monitoring PFT and opthalmology evaluation.   - Continue with closely telemetry monitoring  - Needs ECHO and Cardiac MRI r/o scar  - Plan discussed with Dr. Chung   [phone] 69 year old male with past medical history of HTN, HLD, COPD, MORRO on CPAP at home, recently diagnosed with AFIB on Eliquis/ Cardizem and Gout initally presented to [place] after having syncopal episode at home, loosing consciousness. Pt transferred to [facility] for further workup. S/P Cardiac cath on 7/8- normal coronary arteries Today, RRT called pt noted to have sustained VT, - 220's lasting approximately 3 minutes, which self- terminated Pt remained asymptomatic during episode denies, lightheadedness, dizziness, chest pain, palpitations or SOB.   [place] EP consulted for further management on behalf on Dr. Vivar.    # AFIB  # Ventricular Tachycardia  - ECHO: EF 45%, LA volume index 39   - Tele: AFIB HR 70- 90's, Sustained VT lasting 3 minutes 200- 220bpm self- terminated  - Continue with Heparin drip with transition back to Eliquis for AC  - Continue with Lopressor 25mg BID  - Start Amiodarone load 400mg x 7 days, then 400mg daily x 4 weeks, then 200mg daily. Black box warning explained to patient, advised for periodic LFT, TFT monitoring PFT and opthalmology evaluation.   - Continue with closely telemetry monitoring  - Needs Cardiac MRI r/o scar  - Plan discussed with Dr. Chung   [phone] Patient is a 69 year old male with past medical history of HTN, HLD, COPD, MORRO on CPAP at home and home O2, Recently diagnosed with AFIB on Eliquis/ Cardizem and Gout initally presented to [facility] after having syncopal episode at home in the bathroom post voiding win which he reports loosing consciousness. While in [facility] on tele, noted to have VT.  Pt transferred to [facility] for further workup. S/P Cardiac cath on 7/8- normal coronary arteries Today, RRT called pt noted to have sustained VT, - 220's lasting approximately 3 minutes, which self- terminated Pt remained asymptomatic during episode denies, lightheadedness, dizziness, chest pain, palpitations or SOB.   [facility] EP consulted for further management on behalf on Dr. Vivar.    # AFIB  # Ventricular Tachycardia  - ECHO: EF 45%, LA volume index 39   - Tele: AFIB HR 70- 90's, Sustained VT lasting 3 minutes 200- 220bpm self- terminated  - Continue with Heparin drip with transition back to Eliquis for AC  - Continue with Lopressor 25mg BID  - Start Amiodarone load 400mg x 7 days, then 400mg daily x 4 weeks, then 200mg daily. Black box warning explained to patient, advised for periodic LFT, TFT monitoring PFT and opthalmology evaluation.   - Continue with closely telemetry monitoring  - Needs Cardiac MRI r/o scar  - Plan discussed with Dr. Chung   [phone]

## 2019-07-09 NOTE — PROGRESS NOTE ADULT - SUBJECTIVE AND OBJECTIVE BOX
Seble Alvarado MD  Attending Physician (Hospitalist)  pager: [phone]    Patient is a 69y old  Male who presents with a chief complaint of syncopal episode (09 Jul 2019 10:12)        SUBJECTIVE / OVERNIGHT EVENTS:  Feeling well wants to go home. During interview patient noted to be VTach to 200s for 2 mins asymptomatic however  RRT called but self converted back to afib. hemodynamically stable thereafter.,     MEDICATIONS  (STANDING):  amLODIPine   Tablet 5 milliGRAM(s) Oral daily  atorvastatin 10 milliGRAM(s) Oral at bedtime  buDESOnide 160 MICROgram(s)/formoterol 4.5 MICROgram(s) Inhaler 2 Puff(s) Inhalation two times a day  chlorhexidine 2% Cloths 1 Application(s) Topical daily  cyanocobalamin 1000 MICROGram(s) Oral daily  finasteride 5 milliGRAM(s) Oral daily  furosemide    Tablet 40 milliGRAM(s) Oral daily  heparin  Infusion. 1700 Unit(s)/Hr (17 mL/Hr) IV Continuous <Continuous>  metoprolol tartrate 25 milliGRAM(s) Oral two times a day  pantoprazole    Tablet 40 milliGRAM(s) Oral before breakfast  tamsulosin 0.8 milliGRAM(s) Oral at bedtime  tiotropium 18 MICROgram(s) Capsule 1 Capsule(s) Inhalation daily    MEDICATIONS  (PRN):  ALBUTerol    90 MICROgram(s) HFA Inhaler 2 Puff(s) Inhalation every 6 hours PRN Shortness of Breath and/or Wheezing      Vital Signs Last 24 Hrs  T(C): 36.6 (09 Jul 2019 11:50), Max: 37 (09 Jul 2019 03:53)  T(F): 97.9 (09 Jul 2019 11:50), Max: 98.6 (09 Jul 2019 03:53)  HR: 73 (09 Jul 2019 11:50) (66 - 103)  BP: 110/75 (09 Jul 2019 11:50) (98/65 - 143/77)  BP(mean): --  RR: 18 (09 Jul 2019 11:50) (16 - 18)  SpO2: 92% (09 Jul 2019 11:50) (92% - 98%)  CAPILLARY BLOOD GLUCOSE      POCT Blood Glucose.: 116 mg/dL (09 Jul 2019 11:46)    I&O's Summary    08 Jul 2019 07:01  -  09 Jul 2019 07:00  --------------------------------------------------------  IN: 360 mL / OUT: 2050 mL / NET: -1690 mL    09 Jul 2019 07:01  -  09 Jul 2019 14:32  --------------------------------------------------------  IN: 480 mL / OUT: 300 mL / NET: 180 mL        PHYSICAL EXAM  GENERAL: NAD, well-developed  HEAD:  Atraumatic, Normocephalic  EYES: EOMI, conjunctiva and sclera clear  NECK: Supple, No JVD  CHEST/LUNG: Clear to auscultation bilaterally; No wheeze  HEART: irregular; No murmurs, rubs, or gallops  ABDOMEN: Soft, Nontender, Nondistended; Bowel sounds present  EXTREMITIES:  2+ Peripheral Pulses, LE edema  PSYCH: AAOx3  SKIN: No rashes or lesions    LABS:                        13.7   9.5   )-----------( 284      ( 09 Jul 2019 12:06 )             40.7     07-09    136  |  91<L>  |  19  ----------------------------<  111<H>  3.5   |  32<H>  |  0.84    Ca    8.9      09 Jul 2019 12:06  Phos  2.4     07-09  Mg     1.8     07-09    TPro  7.2  /  Alb  3.3  /  TBili  0.4  /  DBili  x   /  AST  21  /  ALT  44  /  AlkPhos  138<H>  07-09    PT/INR - ( 09 Jul 2019 12:06 )   PT: 14.5 sec;   INR: 1.26 ratio         PTT - ( 09 Jul 2019 13:35 )  PTT:81.4 sec  CARDIAC MARKERS ( 09 Jul 2019 12:06 )  x     / x     / 26 U/L / x     / 1.0 ng/mL            RADIOLOGY & ADDITIONAL TESTS:    Imaging Personally Reviewed:  Consultant(s) Notes Reviewed:    Care Discussed with Consultants/Other Providers:

## 2019-07-10 ENCOUNTER — APPOINTMENT (OUTPATIENT)
Dept: INTERNAL MEDICINE | Facility: CLINIC | Age: 69
End: 2019-07-10

## 2019-07-10 ENCOUNTER — APPOINTMENT (OUTPATIENT)
Dept: CARDIOLOGY | Facility: CLINIC | Age: 69
End: 2019-07-10

## 2019-07-10 LAB
ANION GAP SERPL CALC-SCNC: 14 MMOL/L — SIGNIFICANT CHANGE UP (ref 5–17)
APTT BLD: 95.4 SEC — HIGH (ref 27.5–36.3)
BUN SERPL-MCNC: 17 MG/DL — SIGNIFICANT CHANGE UP (ref 7–23)
CALCIUM SERPL-MCNC: 9.1 MG/DL — SIGNIFICANT CHANGE UP (ref 8.4–10.5)
CHLORIDE SERPL-SCNC: 94 MMOL/L — LOW (ref 96–108)
CO2 SERPL-SCNC: 29 MMOL/L — SIGNIFICANT CHANGE UP (ref 22–31)
CREAT SERPL-MCNC: 0.87 MG/DL — SIGNIFICANT CHANGE UP (ref 0.5–1.3)
GLUCOSE BLDC GLUCOMTR-MCNC: 120 MG/DL — HIGH (ref 70–99)
GLUCOSE SERPL-MCNC: 100 MG/DL — HIGH (ref 70–99)
HCT VFR BLD CALC: 40.6 % — SIGNIFICANT CHANGE UP (ref 39–50)
HGB BLD-MCNC: 12.6 G/DL — LOW (ref 13–17)
MAGNESIUM SERPL-MCNC: 2.3 MG/DL — SIGNIFICANT CHANGE UP (ref 1.6–2.6)
MCHC RBC-ENTMCNC: 24.8 PG — LOW (ref 27–34)
MCHC RBC-ENTMCNC: 31 GM/DL — LOW (ref 32–36)
MCV RBC AUTO: 79.9 FL — LOW (ref 80–100)
PHOSPHATE SERPL-MCNC: 2.7 MG/DL — SIGNIFICANT CHANGE UP (ref 2.5–4.5)
PLATELET # BLD AUTO: 309 K/UL — SIGNIFICANT CHANGE UP (ref 150–400)
POTASSIUM SERPL-MCNC: 3.8 MMOL/L — SIGNIFICANT CHANGE UP (ref 3.5–5.3)
POTASSIUM SERPL-SCNC: 3.8 MMOL/L — SIGNIFICANT CHANGE UP (ref 3.5–5.3)
RBC # BLD: 5.08 M/UL — SIGNIFICANT CHANGE UP (ref 4.2–5.8)
RBC # FLD: 13.9 % — SIGNIFICANT CHANGE UP (ref 10.3–14.5)
SODIUM SERPL-SCNC: 137 MMOL/L — SIGNIFICANT CHANGE UP (ref 135–145)
WBC # BLD: 9.45 K/UL — SIGNIFICANT CHANGE UP (ref 3.8–10.5)
WBC # FLD AUTO: 9.45 K/UL — SIGNIFICANT CHANGE UP (ref 3.8–10.5)

## 2019-07-10 PROCEDURE — 93010 ELECTROCARDIOGRAM REPORT: CPT

## 2019-07-10 PROCEDURE — 99233 SBSQ HOSP IP/OBS HIGH 50: CPT

## 2019-07-10 RX ORDER — METOPROLOL TARTRATE 50 MG
12.5 TABLET ORAL EVERY 6 HOURS
Refills: 0 | Status: DISCONTINUED | OUTPATIENT
Start: 2019-07-10 | End: 2019-07-10

## 2019-07-10 RX ORDER — POTASSIUM CHLORIDE 20 MEQ
20 PACKET (EA) ORAL ONCE
Refills: 0 | Status: COMPLETED | OUTPATIENT
Start: 2019-07-10 | End: 2019-07-10

## 2019-07-10 RX ADMIN — AMIODARONE HYDROCHLORIDE 400 MILLIGRAM(S): 400 TABLET ORAL at 05:44

## 2019-07-10 RX ADMIN — HEPARIN SODIUM 2000 UNIT(S)/HR: 5000 INJECTION INTRAVENOUS; SUBCUTANEOUS at 13:32

## 2019-07-10 RX ADMIN — AMLODIPINE BESYLATE 5 MILLIGRAM(S): 2.5 TABLET ORAL at 05:44

## 2019-07-10 RX ADMIN — PANTOPRAZOLE SODIUM 40 MILLIGRAM(S): 20 TABLET, DELAYED RELEASE ORAL at 05:44

## 2019-07-10 RX ADMIN — CHLORHEXIDINE GLUCONATE 1 APPLICATION(S): 213 SOLUTION TOPICAL at 12:15

## 2019-07-10 RX ADMIN — BUDESONIDE AND FORMOTEROL FUMARATE DIHYDRATE 2 PUFF(S): 160; 4.5 AEROSOL RESPIRATORY (INHALATION) at 05:44

## 2019-07-10 RX ADMIN — FINASTERIDE 5 MILLIGRAM(S): 5 TABLET, FILM COATED ORAL at 12:13

## 2019-07-10 RX ADMIN — Medication 25 MILLIGRAM(S): at 05:44

## 2019-07-10 RX ADMIN — TIOTROPIUM BROMIDE 1 CAPSULE(S): 18 CAPSULE ORAL; RESPIRATORY (INHALATION) at 12:13

## 2019-07-10 RX ADMIN — Medication 20 MILLIEQUIVALENT(S): at 10:17

## 2019-07-10 RX ADMIN — ATORVASTATIN CALCIUM 10 MILLIGRAM(S): 80 TABLET, FILM COATED ORAL at 22:38

## 2019-07-10 RX ADMIN — Medication 25 MILLIGRAM(S): at 17:31

## 2019-07-10 RX ADMIN — PREGABALIN 1000 MICROGRAM(S): 225 CAPSULE ORAL at 12:13

## 2019-07-10 RX ADMIN — TAMSULOSIN HYDROCHLORIDE 0.8 MILLIGRAM(S): 0.4 CAPSULE ORAL at 22:38

## 2019-07-10 RX ADMIN — Medication 100 MILLIGRAM(S): at 22:38

## 2019-07-10 RX ADMIN — Medication 100 MILLIGRAM(S): at 16:19

## 2019-07-10 RX ADMIN — SENNA PLUS 2 TABLET(S): 8.6 TABLET ORAL at 22:38

## 2019-07-10 RX ADMIN — BUDESONIDE AND FORMOTEROL FUMARATE DIHYDRATE 2 PUFF(S): 160; 4.5 AEROSOL RESPIRATORY (INHALATION) at 17:31

## 2019-07-10 RX ADMIN — Medication 40 MILLIGRAM(S): at 05:44

## 2019-07-10 NOTE — PROVIDER CONTACT NOTE (OTHER) - ASSESSMENT
Pt is alert and oriented x4, pt was lying on bed watching TV during event time. Pt denies any chest pain, SOB or palpitation . /65, P 89/mt ,PO2 92%. Afib on tele
Pt is alert and oriented x4, pt was lying on bed watching TV during event time. Pt denies any chest pain, SOB or palpitation . BP 98/65, P 79/mt ,PO2 92%. Afib on tele
Pt is alert and oriented x4, pt was talking to EP NP during event time. Pt felt dizziness during the event time. /69,P:89. Pt otherwise denies any distress. Afib on tele
Pt is alert and oriented x4, pt was talking to family, Had 18 sec and 30 sec of VTac, pt said he felt "wave" on his body.
Manual BP 98/66. All other vss. pt asymptomatic. No c/o of dizziness or SOB.
Pt is alert and oriented x4, pt was lying on bed talking to attending during the event time. Pt denies any chest pain, SOB or palpitation . VSS
Pt is alert and oriented x4, pt was talking to family, Had brief episode of Vtach. RN at bedside, pt said he felt "wave" on his body.

## 2019-07-10 NOTE — RAPID RESPONSE TEAM SUMMARY - NSSITUATIONBACKGROUNDRRT_GEN_ALL_CORE
RRT again called for symptomatic non sustained VT. Patient was in Afib rhythm when RRT arrived to the bedside. BP stable 116/64. Patient is pending transfer to CCU.

## 2019-07-10 NOTE — PROGRESS NOTE ADULT - SUBJECTIVE AND OBJECTIVE BOX
Triny Guevara  Pager #- [phone]    CC: 69 y.o M with PMH of HTN, HLD, MORRO (on CPAP and home oxygen) with recently diagnosed Afib (on Cardizem and Eliquis) who presented to [facility] after syncopal episode.     No acute events overnight.     Telemetry:      Medications:  ALBUTerol    90 MICROgram(s) HFA Inhaler 2 Puff(s) Inhalation every 6 hours PRN  amLODIPine   Tablet 10 milliGRAM(s) Oral daily  apixaban 5 milliGRAM(s) Oral every 12 hours  atorvastatin 10 milliGRAM(s) Oral at bedtime  buDESOnide 160 MICROgram(s)/formoterol 4.5 MICROgram(s) Inhaler 2 Puff(s) Inhalation two times a day  chlorhexidine 2% Cloths 1 Application(s) Topical daily  cyanocobalamin 1000 MICROGram(s) Oral daily  finasteride 5 milliGRAM(s) Oral daily  furosemide    Tablet 40 milliGRAM(s) Oral daily  metoprolol tartrate 25 milliGRAM(s) Oral two times a day  pantoprazole    Tablet 40 milliGRAM(s) Oral before breakfast  tamsulosin 0.8 milliGRAM(s) Oral at bedtime  tiotropium 18 MICROgram(s) Capsule 1 Capsule(s) Inhalation daily      Vitals:  Vital Signs Last 24 Hrs  T(C): 37 (2019 03:53), Max: 37 (2019 03:53)  T(F): 98.6 (2019 03:53), Max: 98.6 (2019 03:53)  HR: 73 (2019 09:21) (66 - 103)  BP: 104/72 (2019 03:53) (100/66 - 143/77)  BP(mean): --  RR: 16 (2019 03:53) (16 - 18)  SpO2: 92% (2019 09:21) (92% - 98%)    I&O's Summary  I&O's Summary    2019 07:01  -  10 Jul 2019 07:00  --------------------------------------------------------  IN: 1460 mL / OUT: 1370 mL / NET: 90 mL      Daily Weight in k.9 (10 Jul 2019 07:27)      Physical Exam:  Appearance: obese man sleeping comfortably in bed in NAD  HENT: NC/AT, Normal oral muscosa  Cardiovascular: distant heart sounds, RRR, no murmurs, gallops or rubs  Respiratory: poor inspiratory effort, clear to auscultation bilaterally  Gastrointestinal: Soft, NT, ND, BS+  Musculoskeletal: No clubbing; no joint deformity   Neurologic: Non-focal  Lymphatic: No lymphadenopathy  Psychiatry: AAOx3, mood & affect appropriate  Skin: No rashes, ecchymoses, or cyanosis               Labs                        13.7   9.5   )-----------( 284      ( 2019 12:06 )             40.7                07-10    137  |  94<L>  |  17  ----------------------------<  100<H>  3.8   |  29  |  0.87    Ca    9.1      10 Jul 2019 07:09  Phos  2.7     07-10  Mg     2.3     07-10    TPro  7.2  /  Alb  3.3  /  TBili  0.4  /  DBili  x   /  AST  21  /  ALT  44  /  AlkPhos  138<H>  07-09        PTT - ( 2019 07:19 )  PTT:67.0 sec  PT/INR - ( 2019 04:31 )   PT: 13.6 sec;   INR: 1.19 ratio    PTT - ( 2019 04:31 )  PTT:28.7 sec      Total Cholesterol: 117  LDL: 48  HDL: 55  T      Echo:  TTE: 2019  ------------------------------------------------------------------------  Dimensions:    Normal Values:  LA:     4.7    2.0 - 4.0 cm  Ao:     3.2    2.0 - 3.8 cm  SEPTUM: 1.4    0.6 - 1.2 cm  PWT:    1.2    0.6 - 1.1 cm  LVIDd:  5.4    3.0 - 5.6 cm  LVIDs:  4.2    1.8 - 4.0 cm  Derived variables:  LVMI: 119 g/m2  RWT: 0.44  EF (Teicholtz): 43 %  Doppler Peak Velocity (m/sec): MV=1.0 AoV=1.3  ------------------------------------------------------------------------  Observations:  Mitral Valve: There is mitral annular calcification. There  is no mitral regurgitation. Peak mitral valve gradient  equals 4 mm Hg, mean transmitral valve gradient equals 1 mm  Hg.  Aortic Valve/Aorta: The aortic valve leaflets are mildly  thickened.  There is aortic sclerosis but no stenosis. Peak  transaortic valve gradient equals 7 mm Hg, mean transaortic  valve gradient equals 3 mm Hg, aortic valve velocity time  integral equals 24 cm, estimated aortic valve area equals  2.3 sqcm. No aortic valve regurgitation seen. Peak left  ventricular outflow tract gradient equals 2 mm Hg, LVOT  velocity time integral equals 13 cm.  Aortic Root: 3.2 cm.  Ascending Aorta: 3.2 cm.  LVOT diameter: 2.4 cm.  Left Atrium: Mildly dilated left atrium.  LA volume index =  39 cc/m2.  Left Ventricle: There is septal hypokineisis.   The LVEF is  about 45%. The left ventricular size is normal  Right Heart: The right atrium is severely dilated.  The  right atrial area= 30cm2. The right ventricle appears  moderately dilated with mildly reduced function.  Minimal  tricuspid regurgitation.  No pulmonic regurgitation.  Pericardium/Pleura: There is no pericardial effusion.  Hemodynamic: Estimated right ventricular systolic pressure  equals 26 mm Hg, assuming right atrial pressure equals 8 mm  Hg, consistent with normal pulmonary pressures.  ------------------------------------------------------------------------  Conclusions:  1. The aortic valve leaflets are mildly thickened.  There  is aortic sclerosis but no stenosis. No aortic valve  regurgitation seen.  2. The left ventricular size is normal  3. There is septal hypokinesis The LVEF is about 45%.  4. The right ventricle appears moderately dilated with  mildly reduced function.  5. There is no significant or tricuspid  regurgitation.  6. There is no pericardial effusion.  ------------------------------------------------------------------------  Confirmed on  2019 - 15:44:18 by Bridget Tavarez M.D.  ------------------------------------------------------------------------    Cath:   PROCEDURE:  --  Left coronary angiography.  --  Right coronary angiography.  --  Sonosite - Diagnostic.  --  Hemostasis with Angioseal.  TECHNIQUE: The risks and alternatives of the procedures and conscious  sedation were explained to the patient and informed consent was obtained.  Cardiac catheterization performed electively.  Local anesthetic given. Right femoral artery access. Left coronary artery  angiography. The vessel was injected utilizing a catheter. Right coronary  artery angiography. The vessel was injected utilizing a catheter. Sonosite  - Diagnostic. Hemostasis with Angioseal. RADIATION EXPOSURE: 2.1 min.  CONTRAST GIVEN: Omnipaque 36 ml.  MEDICATIONS GIVEN: Midazolam, 1 mg, IV. Fentanyl, 25 mcg, IV.  CORONARY VESSELS: The coronary circulation is left dominant.  LM:   --  LM: Normal.  LAD:   --  LAD: Normal.  CX:   --  Circumflex: Normal.  RCA:   --  RCA: Normal.  COMPLICATIONS: There were no complications.  DIAGNOSTIC RECOMMENDATIONS: The patient should continue with the present  medications. Triny Guevara  Pager #- [phone]    CC: 69 y.o M with PMH of HTN, HLD, MORRO (on CPAP and home oxygen) with recently diagnosed Afib (on Cardizem and Eliquis) who presented to [facility] after syncopal episode.     No acute events overnight. Patient seen and examined at bedside. He continues to deny CP, SOB, palpitations, and n/v. He is aware the plan for cardiac MRI today.     Telemetry: A fib (60s-90s)      Medications:  ALBUTerol    90 MICROgram(s) HFA Inhaler 2 Puff(s) Inhalation every 6 hours PRN  amLODIPine   Tablet 10 milliGRAM(s) Oral daily  apixaban 5 milliGRAM(s) Oral every 12 hours  atorvastatin 10 milliGRAM(s) Oral at bedtime  buDESOnide 160 MICROgram(s)/formoterol 4.5 MICROgram(s) Inhaler 2 Puff(s) Inhalation two times a day  chlorhexidine 2% Cloths 1 Application(s) Topical daily  cyanocobalamin 1000 MICROGram(s) Oral daily  finasteride 5 milliGRAM(s) Oral daily  furosemide    Tablet 40 milliGRAM(s) Oral daily  metoprolol tartrate 25 milliGRAM(s) Oral two times a day  pantoprazole    Tablet 40 milliGRAM(s) Oral before breakfast  tamsulosin 0.8 milliGRAM(s) Oral at bedtime  tiotropium 18 MICROgram(s) Capsule 1 Capsule(s) Inhalation daily      Vitals:  Vital Signs Last 24 Hrs  Vital Signs Last 24 Hrs  T(C): 36.8 (10 Jul 2019 04:11), Max: 36.8 (10 Jul 2019 04:11)  T(F): 98.2 (10 Jul 2019 04:11), Max: 98.2 (10 Jul 2019 04:11)  HR: 89 (10 Jul 2019 06:27) (69 - 89)  BP: 95/59 (10 Jul 2019 04:11) (90/60 - 110/75)  BP(mean): --  RR: 20 (10 Jul 2019 04:11) (18 - 20)  SpO2: 93% (10 Jul 2019 06:27) (92% - 95%)    I&O's Summary    2019 07:01  -  10 Jul 2019 07:00  --------------------------------------------------------  IN: 1460 mL / OUT: 1370 mL / NET: 90 mL      Daily Weight in k.9 (10 Jul 2019 07:27)      Physical Exam:  Appearance: obese man sleeping comfortably in bed in NAD  HENT: NC/AT, Normal oral muscosa  Cardiovascular: distant heart sounds, RRR, no murmurs, gallops or rubs  Respiratory: poor inspiratory effort, clear to auscultation bilaterally  Gastrointestinal: Soft, NT, ND, BS+  Musculoskeletal: No clubbing; no joint deformity   Neurologic: Non-focal  Lymphatic: No lymphadenopathy  Psychiatry: AAOx3, mood & affect appropriate  Skin: No rashes, ecchymoses, or cyanosis               Labs                        13.7   9.5   )-----------( 284      ( 2019 12:06 )             40.7                07-10    137  |  94<L>  |  17  ----------------------------<  100<H>  3.8   |  29  |  0.87    Ca    9.1      10 Jul 2019 07:09  Phos  2.7     07-10  Mg     2.3     07-10    TPro  7.2  /  Alb  3.3  /  TBili  0.4  /  DBili  x   /  AST  21  /  ALT  44  /  AlkPhos  138<H>  07-09        PTT - ( 2019 07:19 )  PTT:67.0 sec  PT/INR - ( 2019 04:31 )   PT: 13.6 sec;   INR: 1.19 ratio    PTT - ( 2019 04:31 )  PTT:28.7 sec      Total Cholesterol: 117  LDL: 48  HDL: 55  T      Echo:  TTE: 2019  ------------------------------------------------------------------------  Dimensions:    Normal Values:  LA:     4.7    2.0 - 4.0 cm  Ao:     3.2    2.0 - 3.8 cm  SEPTUM: 1.4    0.6 - 1.2 cm  PWT:    1.2    0.6 - 1.1 cm  LVIDd:  5.4    3.0 - 5.6 cm  LVIDs:  4.2    1.8 - 4.0 cm  Derived variables:  LVMI: 119 g/m2  RWT: 0.44  EF (Teicholtz): 43 %  Doppler Peak Velocity (m/sec): MV=1.0 AoV=1.3  ------------------------------------------------------------------------  Observations:  Mitral Valve: There is mitral annular calcification. There  is no mitral regurgitation. Peak mitral valve gradient  equals 4 mm Hg, mean transmitral valve gradient equals 1 mm  Hg.  Aortic Valve/Aorta: The aortic valve leaflets are mildly  thickened.  There is aortic sclerosis but no stenosis. Peak  transaortic valve gradient equals 7 mm Hg, mean transaortic  valve gradient equals 3 mm Hg, aortic valve velocity time  integral equals 24 cm, estimated aortic valve area equals  2.3 sqcm. No aortic valve regurgitation seen. Peak left  ventricular outflow tract gradient equals 2 mm Hg, LVOT  velocity time integral equals 13 cm.  Aortic Root: 3.2 cm.  Ascending Aorta: 3.2 cm.  LVOT diameter: 2.4 cm.  Left Atrium: Mildly dilated left atrium.  LA volume index =  39 cc/m2.  Left Ventricle: There is septal hypokineisis.   The LVEF is  about 45%. The left ventricular size is normal  Right Heart: The right atrium is severely dilated.  The  right atrial area= 30cm2. The right ventricle appears  moderately dilated with mildly reduced function.  Minimal  tricuspid regurgitation.  No pulmonic regurgitation.  Pericardium/Pleura: There is no pericardial effusion.  Hemodynamic: Estimated right ventricular systolic pressure  equals 26 mm Hg, assuming right atrial pressure equals 8 mm  Hg, consistent with normal pulmonary pressures.  ------------------------------------------------------------------------  Conclusions:  1. The aortic valve leaflets are mildly thickened.  There  is aortic sclerosis but no stenosis. No aortic valve  regurgitation seen.  2. The left ventricular size is normal  3. There is septal hypokinesis The LVEF is about 45%.  4. The right ventricle appears moderately dilated with  mildly reduced function.  5. There is no significant or tricuspid  regurgitation.  6. There is no pericardial effusion.  ------------------------------------------------------------------------  Confirmed on  2019 - 15:44:18 by Bridget Tavarez M.D.  ------------------------------------------------------------------------    Cath:   PROCEDURE:  --  Left coronary angiography.  --  Right coronary angiography.  --  Sonosite - Diagnostic.  --  Hemostasis with Angioseal.  TECHNIQUE: The risks and alternatives of the procedures and conscious  sedation were explained to the patient and informed consent was obtained.  Cardiac catheterization performed electively.  Local anesthetic given. Right femoral artery access. Left coronary artery  angiography. The vessel was injected utilizing a catheter. Right coronary  artery angiography. The vessel was injected utilizing a catheter. Sonosite  - Diagnostic. Hemostasis with Angioseal. RADIATION EXPOSURE: 2.1 min.  CONTRAST GIVEN: Omnipaque 36 ml.  MEDICATIONS GIVEN: Midazolam, 1 mg, IV. Fentanyl, 25 mcg, IV.  CORONARY VESSELS: The coronary circulation is left dominant.  LM:   --  LM: Normal.  LAD:   --  LAD: Normal.  CX:   --  Circumflex: Normal.  RCA:   --  RCA: Normal.  COMPLICATIONS: There were no complications.  DIAGNOSTIC RECOMMENDATIONS: The patient should continue with the present  medications.

## 2019-07-10 NOTE — PROGRESS NOTE ADULT - PROBLEM SELECTOR PLAN 7
Outpatient nutrition eval

## 2019-07-10 NOTE — PROGRESS NOTE ADULT - PROBLEM SELECTOR PLAN 2
A fib on tele. On Rate control with Metoprolol.  - cont with hep gtt for now   - will resume back on eliquis for d/c

## 2019-07-10 NOTE — PHYSICAL THERAPY INITIAL EVALUATION ADULT - PERTINENT HX OF CURRENT PROBLEM, REHAB EVAL
68 y/o M w/ pmhx of HTN, HLD, MORRO (on CPAP, Home O2 2L), recently diagnosed afib (on cardizem and Eliquis) and Gout presented to [place] after having one syncopal episode at home on 6/29.  At [place], Trops negative and pt had ? vtach vs SVT episode (No EKG available) and now transferred to [facility] for possible EPS 68 y/o M w/ pmhx of HTN, HLD, MORRO (on CPAP, Home O2 2L), recently diagnosed afib (on cardizem and Eliquis) and Gout presented to [place] after having one syncopal episode at home on 6/29.  At [place], Trops negative and pt had ? vtach vs SVT episode (No EKG available) and now transferred to [facility] for possible EPS. Pt s/p cardiac cath 7/8, s/p TTE with doppler 7/6. EKG: afib. Pt s/p RRT 7/9 for 3 minute run of Vtach, s/p 30 sec. run of Vtach 7/10 in AM.

## 2019-07-10 NOTE — PHYSICAL THERAPY INITIAL EVALUATION ADULT - PLANNED THERAPY INTERVENTIONS, PT EVAL
GOAL: Pt will negotiate 10 steps with 1 HR and step to pattern independently in 4 weeks./bed mobility training/gait training/balance training/transfer training

## 2019-07-10 NOTE — PROGRESS NOTE ADULT - PROBLEM SELECTOR PROBLEM 7
Class 2 obesity due to excess calories with body mass index (BMI) of 39.0 to 39.9 in adult, unspecified whether serious comorbidity present

## 2019-07-10 NOTE — RAPID RESPONSE TEAM SUMMARY - NSOTHERINTERVENTIONSRRT_GEN_ALL_CORE
CCU fellow consulted for closer monitoring, evaluated the patient at the bedside. Patient currently not accepted to CCU. CCU fellow consulted for closer monitoring, evaluated the patient at the bedside. Patient to be transferred to the CCU.

## 2019-07-10 NOTE — PHYSICAL THERAPY INITIAL EVALUATION ADULT - ADDITIONAL COMMENTS
Patient is currently living in his sister house located at [address] [place] [address] in [place] because he is doing same repairs in his home. PTA patient resides with his spouse in an apartment with 6 entry steps and 1 flight of stairs inside. Pt states independent in ADLs prior to hospitalization and uses a rollator for ambulation. Patient is currently living in his sister house located at [address] [place] [address] in [place] because he is doing same repairs in his home. PTA patient resides with his spouse in an apartment with 6 entry steps and 1 flight of stairs inside. Pt states independent in ADLs prior to hospitalization and uses a rollator for ambulation. Pt also owns a walker

## 2019-07-10 NOTE — PROGRESS NOTE ADULT - SUBJECTIVE AND OBJECTIVE BOX
INTERVAL HPI/OVERNIGHT EVENTS: Pt seen at bedside, during interview pt complained of + dizziness and not feeling well. Had approx 30 sec of VT.    MEDICATIONS  (STANDING):  amLODIPine   Tablet 5 milliGRAM(s) Oral daily  atorvastatin 10 milliGRAM(s) Oral at bedtime  buDESOnide 160 MICROgram(s)/formoterol 4.5 MICROgram(s) Inhaler 2 Puff(s) Inhalation two times a day  chlorhexidine 2% Cloths 1 Application(s) Topical daily  cyanocobalamin 1000 MICROGram(s) Oral daily  docusate sodium 100 milliGRAM(s) Oral three times a day  finasteride 5 milliGRAM(s) Oral daily  furosemide    Tablet 40 milliGRAM(s) Oral daily  heparin  Infusion. 1700 Unit(s)/Hr (17 mL/Hr) IV Continuous <Continuous>  metoprolol tartrate 25 milliGRAM(s) Oral two times a day  pantoprazole    Tablet 40 milliGRAM(s) Oral before breakfast  polyethylene glycol 3350 17 Gram(s) Oral daily  senna 2 Tablet(s) Oral at bedtime  tamsulosin 0.8 milliGRAM(s) Oral at bedtime  tiotropium 18 MICROgram(s) Capsule 1 Capsule(s) Inhalation daily    MEDICATIONS  (PRN):  ALBUTerol    90 MICROgram(s) HFA Inhaler 2 Puff(s) Inhalation every 6 hours PRN Shortness of Breath and/or Wheezing      Allergies    No Known Allergies    Intolerances      ROS:  General: Pt denies fever/chills  Cardiovascular: + Dizziness  Respiratory and Thorax: denies SOB/cough/wheezing  Gastrointestinal: denies abdominal pain/diarrhea/constipation/bloody stool  Skin: denies rashes/sores        Vital Signs Last 24 Hrs  T(C): 36.5 (10 Jul 2019 09:32), Max: 36.8 (10 Jul 2019 04:11)  T(F): 97.7 (10 Jul 2019 09:32), Max: 98.2 (10 Jul 2019 04:11)  HR: 78 (10 Jul 2019 10:47) (69 - 89)  BP: 109/69 (10 Jul 2019 09:32) (90/60 - 110/75)  BP(mean): --  RR: 18 (10 Jul 2019 09:32) (18 - 20)  SpO2: 93% (10 Jul 2019 10:47) (90% - 95%)    Physical Exam:  Neurological: Alert & Oriented x 3  Respiratory: CTA B/L, No wheezing/crackles/rhonchi  Cardiovascular: (+) S1 & S2, RRR  Gastrointestinal: soft, NT, nondistended, (+) BS  Extremities: No pedal edema            LABS:                        12.6   9.45  )-----------( 309      ( 10 Jul 2019 10:08 )             40.6     07-10    137  |  94<L>  |  17  ----------------------------<  100<H>  3.8   |  29  |  0.87    Ca    9.1      10 Jul 2019 07:09  Phos  2.7     07-10  Mg     2.3     07-10    TPro  7.2  /  Alb  3.3  /  TBili  0.4  /  DBili  x   /  AST  21  /  ALT  44  /  AlkPhos  138<H>  07-09    PT/INR - ( 09 Jul 2019 12:06 )   PT: 14.5 sec;   INR: 1.26 ratio         PTT - ( 09 Jul 2019 13:35 )  PTT:81.4 sec INTERVAL HPI/OVERNIGHT EVENTS: Pt seen at bedside, during interview pt complained of + dizziness and not feeling well. Had approx 23 sec of  bpm.    MEDICATIONS  (STANDING):  amLODIPine   Tablet 5 milliGRAM(s) Oral daily  atorvastatin 10 milliGRAM(s) Oral at bedtime  buDESOnide 160 MICROgram(s)/formoterol 4.5 MICROgram(s) Inhaler 2 Puff(s) Inhalation two times a day  chlorhexidine 2% Cloths 1 Application(s) Topical daily  cyanocobalamin 1000 MICROGram(s) Oral daily  docusate sodium 100 milliGRAM(s) Oral three times a day  finasteride 5 milliGRAM(s) Oral daily  furosemide    Tablet 40 milliGRAM(s) Oral daily  heparin  Infusion. 1700 Unit(s)/Hr (17 mL/Hr) IV Continuous <Continuous>  metoprolol tartrate 25 milliGRAM(s) Oral two times a day  pantoprazole    Tablet 40 milliGRAM(s) Oral before breakfast  polyethylene glycol 3350 17 Gram(s) Oral daily  senna 2 Tablet(s) Oral at bedtime  tamsulosin 0.8 milliGRAM(s) Oral at bedtime  tiotropium 18 MICROgram(s) Capsule 1 Capsule(s) Inhalation daily    MEDICATIONS  (PRN):  ALBUTerol    90 MICROgram(s) HFA Inhaler 2 Puff(s) Inhalation every 6 hours PRN Shortness of Breath and/or Wheezing      Allergies    No Known Allergies    Intolerances      ROS:  General: Pt denies fever/chills  Cardiovascular: + Dizziness  Respiratory and Thorax: denies SOB/cough/wheezing  Gastrointestinal: denies abdominal pain/diarrhea/constipation/bloody stool  Skin: denies rashes/sores        Vital Signs Last 24 Hrs  T(C): 36.5 (10 Jul 2019 09:32), Max: 36.8 (10 Jul 2019 04:11)  T(F): 97.7 (10 Jul 2019 09:32), Max: 98.2 (10 Jul 2019 04:11)  HR: 78 (10 Jul 2019 10:47) (69 - 89)  BP: 109/69 (10 Jul 2019 09:32) (90/60 - 110/75)  BP(mean): --  RR: 18 (10 Jul 2019 09:32) (18 - 20)  SpO2: 93% (10 Jul 2019 10:47) (90% - 95%)    Physical Exam:  Neurological: Alert & Oriented x 3  Respiratory: CTA B/L, No wheezing/crackles/rhonchi  Cardiovascular: (+) S1 & S2, RRR  Gastrointestinal: soft, NT, nondistended, (+) BS  Extremities: No pedal edema            LABS:                        12.6   9.45  )-----------( 309      ( 10 Jul 2019 10:08 )             40.6     07-10    137  |  94<L>  |  17  ----------------------------<  100<H>  3.8   |  29  |  0.87    Ca    9.1      10 Jul 2019 07:09  Phos  2.7     07-10  Mg     2.3     07-10    TPro  7.2  /  Alb  3.3  /  TBili  0.4  /  DBili  x   /  AST  21  /  ALT  44  /  AlkPhos  138<H>  07-09    PT/INR - ( 09 Jul 2019 12:06 )   PT: 14.5 sec;   INR: 1.26 ratio         PTT - ( 09 Jul 2019 13:35 )  PTT:81.4 sec

## 2019-07-10 NOTE — PROGRESS NOTE ADULT - ASSESSMENT
69 year old male with past medical history of HTN, HLD, COPD, MORRO on CPAP at home, recently diagnosed with AFIB on Eliquis/ Cardizem and Gout initally presented to [place] after having syncopal episode at home, loosing consciousness. Pt transferred to [facility] for further workup. S/P Cardiac cath on 7/8- normal coronary arteries S/P RRT -Sustained VT, - 220's lasting approximately 3 minutes, which self- terminated. EP consulted for further managment     # AFIB  # Ventricular Tachycardia  - ECHO: EF 45%, LA volume index 39   - Tele: AFIB HR 60- 90's, 9:30 am noted to have VT last 69 year old male with past medical history of HTN, HLD, COPD, MORRO on CPAP at home, recently diagnosed with AFIB on Eliquis/ Cardizem and Gout initally presented to [place] after having syncopal episode at home, loosing consciousness. Pt transferred to [facility] for further workup. S/P Cardiac cath on 7/8- normal coronary arteries S/P RRT -Sustained VT, - 220's lasting approximately 3 minutes, which self- terminated. EP consulted for further managment     # AFIB  # Ventricular Tachycardia  - ECHO: EF 45%, LA volume index 39   - Tele: AFIB HR 60- 90's. This morning around 9:30 noted to have 23 seconds of Symptomatic VT HR 210bpm which self - terminated  - Continue with Heparin drip  - Continue with Lopressor 25mg BID  - Will discontinue Amiodarone for now. Will plan for VT ablation later this week, likely Friday  - Continue with closely telemetry monitoring  - Obtain Cardiac MRI r/o scar  - Plan discussed with Dr. Chung    [id number]

## 2019-07-10 NOTE — PROVIDER CONTACT NOTE (OTHER) - DATE AND TIME:
07-Jul-2019 06:00
09-Jul-2019 11:20
09-Jul-2019 11:45
09-Jul-2019 18:25
10-Jul-2019 09:30
10-Jul-2019 16:33
10-Jul-2019 17:36

## 2019-07-10 NOTE — CHART NOTE - NSCHARTNOTEFT_GEN_A_CORE
30 sec of V- tach reported by RN. pt seen and examined at bedside. NAD, A & O X3 follows commands. Pt was speaking with EP NP at the time, felt "dizzy". Now feel "Okay", denies chest pain, dizziness, SOB.   Cardiology Dr. Guevara aware, pt to be assessed by cardiology. Spoke with EP NP 30 sec of V- tach reported by RN. pt seen and examined at bedside. NAD, A & O X3 follows commands. Pt was speaking with EP NP at the time, felt "dizzy". Now feel "Okay", denies chest pain, dizziness, SOB.   Cardiology Dr. Guevara aware, pt to be assessed by cardiology. Spoke with EP NP who wants to have cardiac MRI done today, leave him off of Amiodarone for plan of ablation.  Will postpone cardiac MRI to early this afternoon. MRI department made aware.     Nai Irvin Np-C  [id number]

## 2019-07-10 NOTE — PROVIDER CONTACT NOTE (OTHER) - REASON
Pt had 16 beats and 5 beats of WCT
Pt had 5 beats of WCT
Pt had episode of VT 30 seconds upto 211
Pt had multiple episodes of VTac
VT upto 215
manual BP 98/66.
Vtac

## 2019-07-10 NOTE — PROVIDER CONTACT NOTE (OTHER) - ACTION/TREATMENT ORDERED:
RRT called, see RRT note
NP informed, cardiology evaluation pending, continue tele monitoring
NP informed, will continue to monitor
NP informed. will continue to monitor
RRT called, see RRT sheet
BP medication rescheduled for 9am. Will continue to monitor.
RRT called, CCU consult initiated

## 2019-07-10 NOTE — PROVIDER CONTACT NOTE (OTHER) - BACKGROUND
Patient is a 69 year old man with history of HTN, HLD, MORRO recently diagnosed Afib (on cardizem and Eliquis) and gout initially presented to [place] after having one syncopal episode at home
Patient is a 69 year old man with history of HTN, HLD, MORRO recently diagnosed Afib (on cardizem and Eliquis) and gout initially presented to [place] after having one syncopal episode at home
Patient is a 69 year old man with history of HTN, HLD, MORRO recently diagnosed Afib (on cardizem and Eliquis) and gout initially presented to [place] after having one syncopal episode at home
Patient is a 69 year old man with history of HTN, HLD, MORRO recently diagnosed Afib (on cardizem and Eliquis) and gout initially presented to [place] after having one syncopal episode at home
Patient is a 69 year old man with history of HTN, HLD, MORRO recently diagnosed Afib (on cardizem and Eliquis) and gout initially presented to [place] after having one syncopal episode at home
Patient is a 69 year old man with history of HTN, HLD, MORRO recently diagnosed Afib (on cardizem and Eliquis) and gout initially presented to [place] after having one syncopal episode at home
Pt admitted w/ recent dx of atrial fibrillation. History of HTN, HLD, COPD and Gout.

## 2019-07-10 NOTE — PROGRESS NOTE ADULT - PROBLEM SELECTOR PLAN 1
Pt had syncopal episode at home on 6/29, was subsequently admitted to [facility] and underwent workup where pt may have had SVT vs VT, unclear as no EKG. Pt transferred to [facility] for further eval initially monitored in CCU and subsequently transferred to [facility]  - s/p cardiac cath without dz.   - another episode of spontaenous VT today. s/p amio yesterday but now stopped as per EP. Likely plan for ablation this week. check cardiac MRI  - Monitor on tele. keep K>4.0 and Mg>2.0

## 2019-07-10 NOTE — CHART NOTE - NSCHARTNOTEFT_GEN_A_CORE
CCU Accept Note    ALICE JUNIOR      ====================  HPI & Hospital Course:   68 y/o M w/ pmhx of HTN, HLD, MORRO (on CPAP, Home O2 2L), recently diagnosed afib (on cardizem and Eliquis) and Gout presented to [place] after having one syncopal episode at home on 6/29.  At [place], Trops negative and pt had ? vtach vs SVT episode (No EKG available) and now transferred to [facility] for possible EPS.    In the CCU, EP was consulted. TTE was performed on 7/6 which showed EF 45%, aortic sclerosis but no stenosis, septal hypokinesis, mildly reduced RV function. Plan to continue with Metoprolol and transition from Eliquis to Heparin drip on 7/7.     S/P Cardiac cath on 7/8- normal coronary arteries. Today, RRT called pt noted to have sustained VT, - 220's lasting approximately 3 minutes, which self- terminated Pt remained asymptomatic during episode denies, lightheadedness, dizziness, chest pain, palpitations or SOB. Transferred to CCU for monitoring. Possible VT ablation this week.    Past Medical History:   COPD on home O2 3L  MORRO on BiPAP at night  HTN  gout    Past Surgical History:   ventral hernia repair    Home Medications:  Advair Diskus 500 mcg-50 mcg inhalation powder: 1 puff(s) inhaled 2 times a day (08 Apr 2016 11:05)  alfuzosin 10 mg oral tablet, extended release: 1 tab(s) orally once a day (05 Jul 2019 12:20)  Eliquis 5 mg oral tablet: 1 tab(s) orally 2 times a day (05 Jul 2019 12:10)  Lasix 40 mg oral tablet: 1 tab(s) orally once a day (05 Jul 2019 12:19)  NexIUM 40 mg oral delayed release capsule: 1 cap(s) orally once a day (08 Apr 2016 11:05)  Norvasc 5 mg oral tablet: 2 tab(s) orally once a day (03 Jun 2016 16:57)  pravastatin 40 mg oral tablet: 1 tab(s) orally once a day (05 Jul 2019 12:20)  ProAir HFA 90 mcg/inh inhalation aerosol: 2 puff(s) inhaled 4 times a day (08 Apr 2016 11:05)  Proscar 5 mg oral tablet: 1 tab(s) orally once a day (05 Jul 2019 12:19)  Spiriva 18 mcg inhalation capsule: 1 cap(s) inhaled once a day (02 Jun 2016 09:46)      Current Medications:   MEDICATIONS  (STANDING):  amLODIPine   Tablet 5 milliGRAM(s) Oral daily  atorvastatin 10 milliGRAM(s) Oral at bedtime  buDESOnide 160 MICROgram(s)/formoterol 4.5 MICROgram(s) Inhaler 2 Puff(s) Inhalation two times a day  chlorhexidine 2% Cloths 1 Application(s) Topical daily  cyanocobalamin 1000 MICROGram(s) Oral daily  docusate sodium 100 milliGRAM(s) Oral three times a day  finasteride 5 milliGRAM(s) Oral daily  furosemide    Tablet 40 milliGRAM(s) Oral daily  heparin  Infusion. 1700 Unit(s)/Hr (17 mL/Hr) IV Continuous <Continuous>  metoprolol tartrate 12.5 milliGRAM(s) Oral every 6 hours  pantoprazole    Tablet 40 milliGRAM(s) Oral before breakfast  polyethylene glycol 3350 17 Gram(s) Oral daily  senna 2 Tablet(s) Oral at bedtime  tamsulosin 0.8 milliGRAM(s) Oral at bedtime  tiotropium 18 MICROgram(s) Capsule 1 Capsule(s) Inhalation daily    MEDICATIONS  (PRN):  ALBUTerol    90 MICROgram(s) HFA Inhaler 2 Puff(s) Inhalation every 6 hours PRN Shortness of Breath and/or Wheezing      Allergies:   none    Family History:   No pertinent family hx.    Social History:   Former smoker, 50 pack years.  ====================  Vital Signs Last 24 Hrs  T(C): 36.6 (10 Jul 2019 19:00), Max: 37.2 (10 Jul 2019 18:30)  T(F): 97.8 (10 Jul 2019 19:00), Max: 99 (10 Jul 2019 18:30)  HR: 82 (10 Jul 2019 19:00) (69 - 94)  BP: 101/78 (10 Jul 2019 19:00) (90/60 - 116/64)  BP(mean): 91 (10 Jul 2019 19:00) (85 - 92)  RR: 22 (10 Jul 2019 19:00) (17 - 24)  SpO2: 97% (10 Jul 2019 19:00) (90% - 97%)    Adult Advanced Hemodynamics Last 24 Hrs    Physical Exam:   General: NAD  HEENT: PERRL, EOMI, normal sclera and conjunctiva, no oral lesions  Neck: Supple, no JVD  Lungs: CTA bilaterally  Heart: RRR, normal S1S2, no murmurs/rubs/gallops  Abdomen: Soft, ND/NT  Extremities: 2+ peripheral pulses, no cyanosis/clubbing/edema, full ROM  Skin: Warm, well-perfused  Neuro: A&O x3, no focal deficits      ====================  Labs & Imaging:   CBC Full  -  ( 10 Jul 2019 10:08 )  WBC Count : 9.45 K/uL  RBC Count : 5.08 M/uL  Hemoglobin : 12.6 g/dL  Hematocrit : 40.6 %  Platelet Count - Automated : 309 K/uL  Mean Cell Volume : 79.9 fl  Mean Cell Hemoglobin : 24.8 pg  Mean Cell Hemoglobin Concentration : 31.0 gm/dL  Auto Neutrophil # : x  Auto Lymphocyte # : x  Auto Monocyte # : x  Auto Eosinophil # : x  Auto Basophil # : x  Auto Neutrophil % : x  Auto Lymphocyte % : x  Auto Monocyte % : x  Auto Eosinophil % : x  Auto Basophil % : x    07-10    137  |  94<L>  |  17  ----------------------------<  100<H>  3.8   |  29  |  0.87    Ca    9.1      10 Jul 2019 07:09  Phos  2.7     07-10  Mg     2.3     07-10    TPro  7.2  /  Alb  3.3  /  TBili  0.4  /  DBili  x   /  AST  21  /  ALT  44  /  AlkPhos  138<H>  07-09    PT/INR - ( 09 Jul 2019 12:06 )   PT: 14.5 sec;   INR: 1.26 ratio         PTT - ( 10 Jul 2019 12:22 )  PTT:95.4 sec    CARDIAC MARKERS ( 09 Jul 2019 12:06 )  x     / x     / 26 U/L / x     / 1.0 ng/mL    ====================  Assessment & Plan:     ====================  Assessment:  69 year old male with past medical history of HTN, HLD, COPD, MORRO on CPAP at home, recently diagnosed with AFIB on Eliquis/ Cardizem and Gout initally presented to [place] after having syncopal episode at home, loosing consciousness. Pt transferred to [facility] for further workup. S/P Cardiac cath on 7/8- normal coronary arteries S/P RRT -Sustained VT, - 220's lasting approximately 3 minutes, which self- terminated. In CCU for monitoring.      Plan  #Neuro  A&Ox3    #Cardiovascular  Pump  No acute issues.    Coronaries  Cath clean, no acute issues.    EP  # AFIB  # Ventricular Tachycardia  - Would use lido acutely for VTach, avoid amio in anticipation of ablation this week.  - ECHO: EF 45%, LA volume index 39   - Tele: AFIB HR 60- 90's. This morning around 9:30 noted to have 23 seconds of Symptomatic VT HR 210bpm which self - terminated  - Continue with Heparin drip  - Continue with Lopressor 25mg BID  - Will discontinue Amiodarone for now. Will plan for VT ablation later this week, likely Friday  - Continue with closely telemetry monitoring  - Obtain Cardiac MRI r/o scar    #Pulmonary  COPD on home O2  MORRO  -c/w BiPAP PRN, O2 PRN  -c/w nebs, home COPD regimen    #Renal  No issues, Oon Lasix 40 PO at home for BP apparently    #Infectious disease  No issues.    #Hematology/Oncology  No issues.    #Gastrointestinal  Dash diet.    #Endocrine  TSH, A1c checks  No acute issues, patient reports nondiabetic.    #Psychiatric  No issues.    #PPx  Heparin drip for aFib    Rhys Israel MD  Internal Medicine Resident  [phone] CCU Accept Note    ALICE JUNIOR      ====================  HPI & Hospital Course:   68 y/o M w/ pmhx of HTN, HLD, MORRO (on CPAP, Home O2 2L), recently diagnosed afib (on cardizem and Eliquis) and Gout presented to [place] after having one syncopal episode at home on 6/29.  At [place], Trops negative and pt had ? vtach vs SVT episode (No EKG available) and now transferred to [facility] for possible EPS.    In the CCU, EP was consulted. TTE was performed on 7/6 which showed EF 45%, aortic sclerosis but no stenosis, septal hypokinesis, mildly reduced RV function. Plan to continue with Metoprolol and transition from Eliquis to Heparin drip on 7/7.     S/P Cardiac cath on 7/8- normal coronary arteries. Today, RRT called pt noted to have sustained VT, - 220's lasting approximately 3 minutes, which self- terminated Pt remained asymptomatic during episode denies, lightheadedness, dizziness, chest pain, palpitations or SOB. Transferred to CCU for monitoring. Possible VT ablation this week.    Past Medical History:   COPD on home O2 3L  MORRO on BiPAP at night  HTN  gout    Past Surgical History:   ventral hernia repair    Home Medications:  Advair Diskus 500 mcg-50 mcg inhalation powder: 1 puff(s) inhaled 2 times a day (08 Apr 2016 11:05)  alfuzosin 10 mg oral tablet, extended release: 1 tab(s) orally once a day (05 Jul 2019 12:20)  Eliquis 5 mg oral tablet: 1 tab(s) orally 2 times a day (05 Jul 2019 12:10)  Lasix 40 mg oral tablet: 1 tab(s) orally once a day (05 Jul 2019 12:19)  NexIUM 40 mg oral delayed release capsule: 1 cap(s) orally once a day (08 Apr 2016 11:05)  Norvasc 5 mg oral tablet: 2 tab(s) orally once a day (03 Jun 2016 16:57)  pravastatin 40 mg oral tablet: 1 tab(s) orally once a day (05 Jul 2019 12:20)  ProAir HFA 90 mcg/inh inhalation aerosol: 2 puff(s) inhaled 4 times a day (08 Apr 2016 11:05)  Proscar 5 mg oral tablet: 1 tab(s) orally once a day (05 Jul 2019 12:19)  Spiriva 18 mcg inhalation capsule: 1 cap(s) inhaled once a day (02 Jun 2016 09:46)      Current Medications:   MEDICATIONS  (STANDING):  amLODIPine   Tablet 5 milliGRAM(s) Oral daily  atorvastatin 10 milliGRAM(s) Oral at bedtime  buDESOnide 160 MICROgram(s)/formoterol 4.5 MICROgram(s) Inhaler 2 Puff(s) Inhalation two times a day  chlorhexidine 2% Cloths 1 Application(s) Topical daily  cyanocobalamin 1000 MICROGram(s) Oral daily  docusate sodium 100 milliGRAM(s) Oral three times a day  finasteride 5 milliGRAM(s) Oral daily  furosemide    Tablet 40 milliGRAM(s) Oral daily  heparin  Infusion. 1700 Unit(s)/Hr (17 mL/Hr) IV Continuous <Continuous>  metoprolol tartrate 12.5 milliGRAM(s) Oral every 6 hours  pantoprazole    Tablet 40 milliGRAM(s) Oral before breakfast  polyethylene glycol 3350 17 Gram(s) Oral daily  senna 2 Tablet(s) Oral at bedtime  tamsulosin 0.8 milliGRAM(s) Oral at bedtime  tiotropium 18 MICROgram(s) Capsule 1 Capsule(s) Inhalation daily    MEDICATIONS  (PRN):  ALBUTerol    90 MICROgram(s) HFA Inhaler 2 Puff(s) Inhalation every 6 hours PRN Shortness of Breath and/or Wheezing      Allergies:   none    Family History:   No pertinent family hx.    Social History:   Former smoker, 50 pack years.  ====================  Vital Signs Last 24 Hrs  T(C): 36.6 (10 Jul 2019 19:00), Max: 37.2 (10 Jul 2019 18:30)  T(F): 97.8 (10 Jul 2019 19:00), Max: 99 (10 Jul 2019 18:30)  HR: 82 (10 Jul 2019 19:00) (69 - 94)  BP: 101/78 (10 Jul 2019 19:00) (90/60 - 116/64)  BP(mean): 91 (10 Jul 2019 19:00) (85 - 92)  RR: 22 (10 Jul 2019 19:00) (17 - 24)  SpO2: 97% (10 Jul 2019 19:00) (90% - 97%)    Adult Advanced Hemodynamics Last 24 Hrs    Physical Exam:   General: NAD  HEENT: PERRL, EOMI, normal sclera and conjunctiva, no oral lesions  Neck: Supple, no JVD  Lungs: CTA bilaterally  Heart: RRR, normal S1S2, no murmurs/rubs/gallops  Abdomen: Soft, ND/NT  Extremities: 2+ peripheral pulses, no cyanosis/clubbing/edema, full ROM  Skin: Warm, well-perfused  Neuro: A&O x3, no focal deficits      ====================  Labs & Imaging:   CBC Full  -  ( 10 Jul 2019 10:08 )  WBC Count : 9.45 K/uL  RBC Count : 5.08 M/uL  Hemoglobin : 12.6 g/dL  Hematocrit : 40.6 %  Platelet Count - Automated : 309 K/uL  Mean Cell Volume : 79.9 fl  Mean Cell Hemoglobin : 24.8 pg  Mean Cell Hemoglobin Concentration : 31.0 gm/dL  Auto Neutrophil # : x  Auto Lymphocyte # : x  Auto Monocyte # : x  Auto Eosinophil # : x  Auto Basophil # : x  Auto Neutrophil % : x  Auto Lymphocyte % : x  Auto Monocyte % : x  Auto Eosinophil % : x  Auto Basophil % : x    07-10    137  |  94<L>  |  17  ----------------------------<  100<H>  3.8   |  29  |  0.87    Ca    9.1      10 Jul 2019 07:09  Phos  2.7     07-10  Mg     2.3     07-10    TPro  7.2  /  Alb  3.3  /  TBili  0.4  /  DBili  x   /  AST  21  /  ALT  44  /  AlkPhos  138<H>  07-09    PT/INR - ( 09 Jul 2019 12:06 )   PT: 14.5 sec;   INR: 1.26 ratio         PTT - ( 10 Jul 2019 12:22 )  PTT:95.4 sec    CARDIAC MARKERS ( 09 Jul 2019 12:06 )  x     / x     / 26 U/L / x     / 1.0 ng/mL    ====================  Assessment & Plan:     ====================  Assessment:  69 year old male with past medical history of HTN, HLD, COPD, MORRO on CPAP at home, recently diagnosed with AFIB on Eliquis/ Cardizem and Gout initally presented to [place] after having syncopal episode at home, loosing consciousness. Pt transferred to [facility] for further workup. S/P Cardiac cath on 7/8- normal coronary arteries S/P RRT -Sustained VT, - 220's lasting approximately 3 minutes, which self- terminated. In CCU for monitoring.      Plan  #Neuro  A&Ox3    #Cardiovascular  Pump  No acute issues.    Coronaries  Cath clean, no acute issues.    EP  # AFIB  # Ventricular Tachycardia  - Would use lido acutely for VTach, avoid amio in anticipation of ablation this week.  - ECHO: EF 45%, LA volume index 39   - Tele: AFIB HR 60- 90's. This morning around 9:30 noted to have 23 seconds of Symptomatic VT HR 210bpm which self - terminated  - Continue with Heparin drip  - Continue with Lopressor 25mg BID  - Will discontinue Amiodarone for now. Will plan for VT ablation later this week, likely Friday  - Continue with closely telemetry monitoring  - Obtain Cardiac MRI r/o scar    #Pulmonary  COPD on home O2  MORRO  -c/w BiPAP PRN, O2 PRN  -c/w nebs, home COPD regimen    #Renal  No issues, Oon Lasix 40 PO at home for BP apparently    #Infectious disease  No issues.    #Hematology/Oncology  No issues.    #Gastrointestinal  Dash diet.    #Endocrine  TSH, A1c checks  No acute issues, patient reports nondiabetic.    #Psychiatric  No issues.    #PPx  Heparin drip for aFib    Rhys Israel MD  Internal Medicine Resident  [phone]    CCU Fellow:    69 M transferred to CCU due to recurrent VT on floors for closer monitoring. Stable overnight in CCU w/ PVCs on tele. Plan for VF ablation with EP.

## 2019-07-10 NOTE — PROGRESS NOTE ADULT - PROBLEM SELECTOR PLAN 6
BP in low 90's to 100 systolic. On Norvasc 10mg   - Will decrease to 5mg and monitor BP  - Consider transitioning to ACE-I given EF

## 2019-07-10 NOTE — PHYSICAL THERAPY INITIAL EVALUATION ADULT - GAIT DEVIATIONS NOTED, PT EVAL
decreased amanda/decreased weight-shifting ability/increased time in double stance/decreased velocity of limb motion

## 2019-07-10 NOTE — PROGRESS NOTE ADULT - ASSESSMENT
Assessment: Pt is a 68 y/o M w/ a PMHx of HTN, HLD, MORRO (on CPAP), recently diagnosed afib (on cardizem and Eliquis) and gout presented to [place] after having one syncopal episode at home on 6/29.  At [place], trops negative and pt had questionable Vtach vs. SVT (No EKG available) and now transferred to [facility] for cath today + possible EPS.     #Syncopal episode  - Results of cardiac cath was normal  - c/w telemetry; ovenight it showed:              - appreciate EP recs  - continue with Amiodarone loading (s/p two doses)  - Cardiac MRI ordered for today 07/10/19  - D/C heparin gtt and restart home dose of Eliquis 5mg twice daily  - Continue metoprolol tartrate 25mg twice daily, monitor blood pressure  - Continue atorvastatin 10mg  - Continue amlodipine 5mg  - Continue lasix 40 mg PO daily, strict I/Os, daily standing weights  - Keep K >4, Mg >2  - Followed by Dr. Corina Vivar for EP- plan for possible EP study after ischemic workup    Triny Guevara- PGY-1  Pager# [phone] Assessment: Pt is a 70 y/o M w/ a PMHx of HTN, HLD, MORRO (on CPAP), recently diagnosed afib (on cardizem and Eliquis) and gout presented to [place] after having one syncopal episode at home on 6/29.  At [place], trops negative and pt had questionable Vtach vs. SVT (No EKG available) and now transferred to [facility] for cath today + possible EPS.     #Syncopal episode  - Results of cardiac cath was normal  - c/w telemetry; ovenight it showed: A fib (60s-90s)             - appreciate EP recs  - c/w Amiodarone loading 400mg twice daily X 7 days (s/p two doses)  - Cardiac MRI ordered for today 07/10/19  - D/C heparin gtt and restart home dose of Eliquis 5mg twice daily  - Continue metoprolol tartrate 25mg twice daily, monitor blood pressure  - Continue atorvastatin 10mg  - Continue amlodipine 5mg  - Continue lasix 40 mg PO daily, strict I/Os, daily standing weights  - Keep K >4, Mg >2  - Followed by Dr. Corina Vivar for EP- plan for possible EP study after ischemic workup    Triny Guevara- PGY-1  Pager# [phone]

## 2019-07-10 NOTE — CHART NOTE - NSCHARTNOTEFT_GEN_A_CORE
Runs of WCT reported by tech, pt seen and examined at bedside. As per pt , felt " waves" from head to whole body. Tele reviewed with tech, had 18 seconds and 30 seconds of WCT. RRT called for symptomatic Vtach.   on bedside monitor, pt on A. fib HR 70's to 80s, having frequent runs of WCT, feels " waves" with runs of WCT. SBPs in 110s, A & O X3 now.   RRT team contacted CCU. pt to be transferred to CCU.    Nai Irvin NP-C  #[id number]

## 2019-07-10 NOTE — PROVIDER CONTACT NOTE (OTHER) - NAME OF MD/NP/PA/DO NOTIFIED:
Brandee Trimble NP
Brandee Trimble NP
NP Brandee Trimble/RRT team
Nai Irvin NP
RRT team
Tania Astorga MD
RRT team

## 2019-07-10 NOTE — RAPID RESPONSE TEAM SUMMARY - NSSITUATIONBACKGROUNDRRT_GEN_ALL_CORE
RRT called for symptomatic NSVT. Briefly, patient is a 69 year old man with history of HTN, HLD, COPD, MORRO on CPAP at home, Afib on Eliquis presented as a transfer from [place] to [facility] for further workup for syncope. Patient had previous RRT called for sustained VT. Patient is being followed by EP service and was started on Amiodarone on 7/9, that is now discontinued due to pending ablation. Patient was planning for cardiac MRI but cannot be performed due to body habitus. Patient's wide complex tachycardia lasted for 30 secs and he was symptomatic during the episode, in which he felt palpitations and lightheadedness. Patient's BP stable 105/63.

## 2019-07-10 NOTE — PROGRESS NOTE ADULT - SUBJECTIVE AND OBJECTIVE BOX
Seble Alvarado MD  Attending Physician (Hospitalist)  pager: [phone]    Patient is a 69y old  Male who presents with a chief complaint of syncopal episode (10 Jul 2019 11:09)        SUBJECTIVE / OVERNIGHT EVENTS:  Had another episode of 30sec VT this time symptomatic with dizziness. now back in Afib.   no acute overnight issues. afebrile.     MEDICATIONS  (STANDING):  amLODIPine   Tablet 5 milliGRAM(s) Oral daily  atorvastatin 10 milliGRAM(s) Oral at bedtime  buDESOnide 160 MICROgram(s)/formoterol 4.5 MICROgram(s) Inhaler 2 Puff(s) Inhalation two times a day  chlorhexidine 2% Cloths 1 Application(s) Topical daily  cyanocobalamin 1000 MICROGram(s) Oral daily  docusate sodium 100 milliGRAM(s) Oral three times a day  finasteride 5 milliGRAM(s) Oral daily  furosemide    Tablet 40 milliGRAM(s) Oral daily  heparin  Infusion. 1700 Unit(s)/Hr (17 mL/Hr) IV Continuous <Continuous>  metoprolol tartrate 25 milliGRAM(s) Oral two times a day  pantoprazole    Tablet 40 milliGRAM(s) Oral before breakfast  polyethylene glycol 3350 17 Gram(s) Oral daily  senna 2 Tablet(s) Oral at bedtime  tamsulosin 0.8 milliGRAM(s) Oral at bedtime  tiotropium 18 MICROgram(s) Capsule 1 Capsule(s) Inhalation daily    MEDICATIONS  (PRN):  ALBUTerol    90 MICROgram(s) HFA Inhaler 2 Puff(s) Inhalation every 6 hours PRN Shortness of Breath and/or Wheezing      Vital Signs Last 24 Hrs  T(C): 36.8 (10 Jul 2019 11:26), Max: 36.8 (10 Jul 2019 04:11)  T(F): 98.2 (10 Jul 2019 11:26), Max: 98.2 (10 Jul 2019 04:11)  HR: 88 (10 Jul 2019 11:26) (69 - 89)  BP: 111/59 (10 Jul 2019 11:26) (90/60 - 111/59)  BP(mean): --  RR: 17 (10 Jul 2019 11:26) (17 - 20)  SpO2: 92% (10 Jul 2019 11:26) (90% - 95%)  CAPILLARY BLOOD GLUCOSE        I&O's Summary    09 Jul 2019 07:01  -  10 Jul 2019 07:00  --------------------------------------------------------  IN: 1460 mL / OUT: 1370 mL / NET: 90 mL    10 Jul 2019 07:01  -  10 Jul 2019 14:36  --------------------------------------------------------  IN: 360 mL / OUT: 300 mL / NET: 60 mL        PHYSICAL EXAM  GENERAL: NAD, well-developed  HEAD:  Atraumatic, Normocephalic  EYES: EOMI, conjunctiva and sclera clear  NECK: Supple, No JVD  CHEST/LUNG: Clear to auscultation bilaterally; No wheeze  HEART: irregular; No murmurs, rubs, or gallops  ABDOMEN: Soft, Nontender, Nondistended; Bowel sounds present  EXTREMITIES:  2+ Peripheral Pulses, LE edema  PSYCH: AAOx3  SKIN: No rashes or lesions    LABS:                        12.6   9.45  )-----------( 309      ( 10 Jul 2019 10:08 )             40.6     07-10    137  |  94<L>  |  17  ----------------------------<  100<H>  3.8   |  29  |  0.87    Ca    9.1      10 Jul 2019 07:09  Phos  2.7     07-10  Mg     2.3     07-10    TPro  7.2  /  Alb  3.3  /  TBili  0.4  /  DBili  x   /  AST  21  /  ALT  44  /  AlkPhos  138<H>  07-09    PT/INR - ( 09 Jul 2019 12:06 )   PT: 14.5 sec;   INR: 1.26 ratio         PTT - ( 10 Jul 2019 12:22 )  PTT:95.4 sec  CARDIAC MARKERS ( 09 Jul 2019 12:06 )  x     / x     / 26 U/L / x     / 1.0 ng/mL            RADIOLOGY & ADDITIONAL TESTS:    Imaging Personally Reviewed:  Consultant(s) Notes Reviewed:    Care Discussed with Consultants/Other Providers:

## 2019-07-10 NOTE — PROGRESS NOTE ADULT - ASSESSMENT
70 y/o M w/ a PMHx of HTN, HLD, MORRO (on CPAP), recently diagnosed afib (on cardizem and Eliquis) and gout presented to [place] after having one syncopal episode at home on 6/29.  At [place], Trops negative and pt had ?vtach vs SVT episode (No EKG available) and now transferred to [facility] for cath + possible EPS

## 2019-07-10 NOTE — PROVIDER CONTACT NOTE (OTHER) - RECOMMENDATIONS
Medicine NP informed, self terminated in 30 sec,, pt denies any s/s then  Pt need to be transferred to higher level of care ?
Mg and Ph ? NP informed
NP informed, Pt already received metoprolol 25 mg PO
RRT called
Md notified
RRT called
RRT called

## 2019-07-11 ENCOUNTER — TRANSCRIPTION ENCOUNTER (OUTPATIENT)
Age: 69
End: 2019-07-11

## 2019-07-11 ENCOUNTER — RX RENEWAL (OUTPATIENT)
Age: 69
End: 2019-07-11

## 2019-07-11 DIAGNOSIS — J44.9 CHRONIC OBSTRUCTIVE PULMONARY DISEASE, UNSPECIFIED: ICD-10-CM

## 2019-07-11 DIAGNOSIS — N40.1 BENIGN PROSTATIC HYPERPLASIA WITH LOWER URINARY TRACT SYMPTOMS: ICD-10-CM

## 2019-07-11 LAB
ANION GAP SERPL CALC-SCNC: 13 MMOL/L — SIGNIFICANT CHANGE UP (ref 5–17)
ANION GAP SERPL CALC-SCNC: 14 MMOL/L — SIGNIFICANT CHANGE UP (ref 5–17)
APTT BLD: 94 SEC — HIGH (ref 27.5–36.3)
BASOPHILS # BLD AUTO: 0.1 K/UL — SIGNIFICANT CHANGE UP (ref 0–0.2)
BASOPHILS NFR BLD AUTO: 0.9 % — SIGNIFICANT CHANGE UP (ref 0–2)
BLD GP AB SCN SERPL QL: NEGATIVE — SIGNIFICANT CHANGE UP
BUN SERPL-MCNC: 15 MG/DL — SIGNIFICANT CHANGE UP (ref 7–23)
BUN SERPL-MCNC: 15 MG/DL — SIGNIFICANT CHANGE UP (ref 7–23)
CALCIUM SERPL-MCNC: 8.8 MG/DL — SIGNIFICANT CHANGE UP (ref 8.4–10.5)
CALCIUM SERPL-MCNC: 9.4 MG/DL — SIGNIFICANT CHANGE UP (ref 8.4–10.5)
CHLORIDE SERPL-SCNC: 92 MMOL/L — LOW (ref 96–108)
CHLORIDE SERPL-SCNC: 94 MMOL/L — LOW (ref 96–108)
CO2 SERPL-SCNC: 27 MMOL/L — SIGNIFICANT CHANGE UP (ref 22–31)
CO2 SERPL-SCNC: 27 MMOL/L — SIGNIFICANT CHANGE UP (ref 22–31)
CREAT SERPL-MCNC: 0.9 MG/DL — SIGNIFICANT CHANGE UP (ref 0.5–1.3)
CREAT SERPL-MCNC: 1.05 MG/DL — SIGNIFICANT CHANGE UP (ref 0.5–1.3)
EOSINOPHIL # BLD AUTO: 0.4 K/UL — SIGNIFICANT CHANGE UP (ref 0–0.5)
EOSINOPHIL NFR BLD AUTO: 4.3 % — SIGNIFICANT CHANGE UP (ref 0–6)
GLUCOSE SERPL-MCNC: 105 MG/DL — HIGH (ref 70–99)
GLUCOSE SERPL-MCNC: 106 MG/DL — HIGH (ref 70–99)
HCT VFR BLD CALC: 43 % — SIGNIFICANT CHANGE UP (ref 39–50)
HGB BLD-MCNC: 14.5 G/DL — SIGNIFICANT CHANGE UP (ref 13–17)
INR BLD: 1.18 RATIO — HIGH (ref 0.88–1.16)
LYMPHOCYTES # BLD AUTO: 2.4 K/UL — SIGNIFICANT CHANGE UP (ref 1–3.3)
LYMPHOCYTES # BLD AUTO: 24.4 % — SIGNIFICANT CHANGE UP (ref 13–44)
MAGNESIUM SERPL-MCNC: 2.1 MG/DL — SIGNIFICANT CHANGE UP (ref 1.6–2.6)
MAGNESIUM SERPL-MCNC: 2.1 MG/DL — SIGNIFICANT CHANGE UP (ref 1.6–2.6)
MCHC RBC-ENTMCNC: 26.9 PG — LOW (ref 27–34)
MCHC RBC-ENTMCNC: 33.7 GM/DL — SIGNIFICANT CHANGE UP (ref 32–36)
MCV RBC AUTO: 79.9 FL — LOW (ref 80–100)
MONOCYTES # BLD AUTO: 1 K/UL — HIGH (ref 0–0.9)
MONOCYTES NFR BLD AUTO: 10 % — SIGNIFICANT CHANGE UP (ref 2–14)
NEUTROPHILS # BLD AUTO: 5.8 K/UL — SIGNIFICANT CHANGE UP (ref 1.8–7.4)
NEUTROPHILS NFR BLD AUTO: 60.4 % — SIGNIFICANT CHANGE UP (ref 43–77)
PHOSPHATE SERPL-MCNC: 3.1 MG/DL — SIGNIFICANT CHANGE UP (ref 2.5–4.5)
PHOSPHATE SERPL-MCNC: 3.4 MG/DL — SIGNIFICANT CHANGE UP (ref 2.5–4.5)
PLATELET # BLD AUTO: 265 K/UL — SIGNIFICANT CHANGE UP (ref 150–400)
POTASSIUM SERPL-MCNC: 3.8 MMOL/L — SIGNIFICANT CHANGE UP (ref 3.5–5.3)
POTASSIUM SERPL-MCNC: 4.8 MMOL/L — SIGNIFICANT CHANGE UP (ref 3.5–5.3)
POTASSIUM SERPL-SCNC: 3.8 MMOL/L — SIGNIFICANT CHANGE UP (ref 3.5–5.3)
POTASSIUM SERPL-SCNC: 4.8 MMOL/L — SIGNIFICANT CHANGE UP (ref 3.5–5.3)
PROTHROM AB SERPL-ACNC: 13.6 SEC — HIGH (ref 10–12.9)
RBC # BLD: 5.38 M/UL — SIGNIFICANT CHANGE UP (ref 4.2–5.8)
RBC # FLD: 13.9 % — SIGNIFICANT CHANGE UP (ref 10.3–14.5)
RH IG SCN BLD-IMP: POSITIVE — SIGNIFICANT CHANGE UP
SODIUM SERPL-SCNC: 133 MMOL/L — LOW (ref 135–145)
SODIUM SERPL-SCNC: 134 MMOL/L — LOW (ref 135–145)
WBC # BLD: 9.6 K/UL — SIGNIFICANT CHANGE UP (ref 3.8–10.5)
WBC # FLD AUTO: 9.6 K/UL — SIGNIFICANT CHANGE UP (ref 3.8–10.5)

## 2019-07-11 PROCEDURE — 71045 X-RAY EXAM CHEST 1 VIEW: CPT | Mod: 26

## 2019-07-11 PROCEDURE — 99233 SBSQ HOSP IP/OBS HIGH 50: CPT | Mod: GC

## 2019-07-11 PROCEDURE — 93010 ELECTROCARDIOGRAM REPORT: CPT

## 2019-07-11 RX ORDER — POTASSIUM CHLORIDE 20 MEQ
40 PACKET (EA) ORAL ONCE
Refills: 0 | Status: COMPLETED | OUTPATIENT
Start: 2019-07-11 | End: 2019-07-11

## 2019-07-11 RX ORDER — LIDOCAINE HCL 20 MG/ML
0.5 VIAL (ML) INJECTION
Qty: 2 | Refills: 0 | Status: DISCONTINUED | OUTPATIENT
Start: 2019-07-11 | End: 2019-07-11

## 2019-07-11 RX ORDER — METOPROLOL TARTRATE 50 MG
5 TABLET ORAL ONCE
Refills: 0 | Status: COMPLETED | OUTPATIENT
Start: 2019-07-11 | End: 2019-07-11

## 2019-07-11 RX ORDER — LIDOCAINE HCL 20 MG/ML
75 VIAL (ML) INJECTION ONCE
Refills: 0 | Status: COMPLETED | OUTPATIENT
Start: 2019-07-11 | End: 2019-07-11

## 2019-07-11 RX ORDER — LIDOCAINE HCL 20 MG/ML
150 VIAL (ML) INJECTION ONCE
Refills: 0 | Status: COMPLETED | OUTPATIENT
Start: 2019-07-11 | End: 2019-07-11

## 2019-07-11 RX ORDER — MAGNESIUM SULFATE 500 MG/ML
2 VIAL (ML) INJECTION ONCE
Refills: 0 | Status: COMPLETED | OUTPATIENT
Start: 2019-07-11 | End: 2019-07-11

## 2019-07-11 RX ADMIN — Medication 150 MILLIGRAM(S): at 09:15

## 2019-07-11 RX ADMIN — Medication 5 MILLIGRAM(S): at 09:55

## 2019-07-11 RX ADMIN — Medication 7.5 MG/MIN: at 09:28

## 2019-07-11 RX ADMIN — BUDESONIDE AND FORMOTEROL FUMARATE DIHYDRATE 2 PUFF(S): 160; 4.5 AEROSOL RESPIRATORY (INHALATION) at 17:20

## 2019-07-11 RX ADMIN — TAMSULOSIN HYDROCHLORIDE 0.8 MILLIGRAM(S): 0.4 CAPSULE ORAL at 21:35

## 2019-07-11 RX ADMIN — PREGABALIN 1000 MICROGRAM(S): 225 CAPSULE ORAL at 12:11

## 2019-07-11 RX ADMIN — BUDESONIDE AND FORMOTEROL FUMARATE DIHYDRATE 2 PUFF(S): 160; 4.5 AEROSOL RESPIRATORY (INHALATION) at 06:37

## 2019-07-11 RX ADMIN — ATORVASTATIN CALCIUM 10 MILLIGRAM(S): 80 TABLET, FILM COATED ORAL at 21:35

## 2019-07-11 RX ADMIN — Medication 3.75 MG/MIN: at 14:26

## 2019-07-11 RX ADMIN — Medication 40 MILLIEQUIVALENT(S): at 17:54

## 2019-07-11 RX ADMIN — CHLORHEXIDINE GLUCONATE 1 APPLICATION(S): 213 SOLUTION TOPICAL at 08:41

## 2019-07-11 RX ADMIN — HEPARIN SODIUM 2000 UNIT(S)/HR: 5000 INJECTION INTRAVENOUS; SUBCUTANEOUS at 05:07

## 2019-07-11 RX ADMIN — FINASTERIDE 5 MILLIGRAM(S): 5 TABLET, FILM COATED ORAL at 12:11

## 2019-07-11 RX ADMIN — Medication 100 MILLIGRAM(S): at 05:07

## 2019-07-11 RX ADMIN — Medication 5 MILLIGRAM(S): at 23:29

## 2019-07-11 RX ADMIN — Medication 40 MILLIGRAM(S): at 05:07

## 2019-07-11 RX ADMIN — PANTOPRAZOLE SODIUM 40 MILLIGRAM(S): 20 TABLET, DELAYED RELEASE ORAL at 05:07

## 2019-07-11 RX ADMIN — Medication 50 GRAM(S): at 09:55

## 2019-07-11 RX ADMIN — Medication 75 MILLIGRAM(S): at 09:28

## 2019-07-11 NOTE — PROGRESS NOTE ADULT - SUBJECTIVE AND OBJECTIVE BOX
Patient is a 69y old  Male who presents with a chief complaint of syncopal episode (10 Jul 2019 11:09)    HPI:  68 y/o M w/ pmhx of HTN, HLD, MORRO (on CPAP, Home O2 2L), recently diagnosed afib (on cardizem and Eliquis) and Gout presented to [place] after having one syncopal episode at home on 6/29.  At [place], Trops negative and pt had ? vtach vs SVT episode (No EKG available) and now transferred to [facility] for possible EPS. Denies CP, SOB, HA, dizziness or fatigue (05 Jul 2019 12:41)      Overnight Event: RRT called, pt noted to have sustained VT, - 220's lasting approximately 3 minutes, which self- terminated Pt remained asymptomatic during episode. transferred to CCU for closer monitoring.    REVIEW OF SYSTEMS:  	    MEDICATIONS  (STANDING):  amLODIPine   Tablet 5 milliGRAM(s) Oral daily  atorvastatin 10 milliGRAM(s) Oral at bedtime  buDESOnide 160 MICROgram(s)/formoterol 4.5 MICROgram(s) Inhaler 2 Puff(s) Inhalation two times a day  chlorhexidine 2% Cloths 1 Application(s) Topical daily  cyanocobalamin 1000 MICROGram(s) Oral daily  docusate sodium 100 milliGRAM(s) Oral three times a day  finasteride 5 milliGRAM(s) Oral daily  furosemide    Tablet 40 milliGRAM(s) Oral daily  heparin  Infusion. 1700 Unit(s)/Hr (17 mL/Hr) IV Continuous <Continuous>  pantoprazole    Tablet 40 milliGRAM(s) Oral before breakfast  polyethylene glycol 3350 17 Gram(s) Oral daily  senna 2 Tablet(s) Oral at bedtime  tamsulosin 0.8 milliGRAM(s) Oral at bedtime  tiotropium 18 MICROgram(s) Capsule 1 Capsule(s) Inhalation daily    MEDICATIONS  (PRN):  ALBUTerol    90 MICROgram(s) HFA Inhaler 2 Puff(s) Inhalation every 6 hours PRN Shortness of Breath and/or Wheezing        PHYSICAL EXAM:  Vital Signs Last 24 Hrs  T(C): 36.9 (11 Jul 2019 07:00), Max: 37.2 (10 Jul 2019 18:30)  T(F): 98.4 (11 Jul 2019 07:00), Max: 99 (10 Jul 2019 18:30)  HR: 78 (11 Jul 2019 07:00) (56 - 94)  BP: 111/64 (11 Jul 2019 07:00) (91/79 - 116/64)  BP(mean): 86 (11 Jul 2019 07:00) (73 - 92)  RR: 17 (11 Jul 2019 07:00) (15 - 24)  SpO2: 94% (11 Jul 2019 07:00) (90% - 97%)  I&O's Summary    10 Jul 2019 07:01  -  11 Jul 2019 07:00  --------------------------------------------------------  IN: 880 mL / OUT: 1000 mL / NET: -120 mL        Appearance: Normal	  HEENT:   Normal oral mucosa, PERRL, EOMI	  Lymphatic: No lymphadenopathy  Cardiovascular: Normal S1 S2, No JVD, No murmurs, No edema  Respiratory: Lungs clear to auscultation	  Psychiatry: A & O x 3, Mood & affect appropriate  Gastrointestinal:  Soft, Non-tender, + BS	  Skin: No rashes, No ecchymoses, No cyanosis	  Neurologic: Non-focal  Extremities: Normal range of motion, No clubbing, cyanosis or edema  Vascular: Peripheral pulses palpable 2+ bilaterally    LABS:	 	                        14.5   9.6   )-----------( 265      ( 11 Jul 2019 04:30 )             43.0     Auto Eosinophil # 0.4   / Auto Eosinophil % 4.3   / Auto Neutrophil # 5.8   / Auto Neutrophil % 60.4  / BANDS % x                            12.6   9.45  )-----------( 309      ( 10 Jul 2019 10:08 )             40.6     Auto Eosinophil # x     / Auto Eosinophil % x     / Auto Neutrophil # x     / Auto Neutrophil % x     / BANDS % x                            13.7   9.5   )-----------( 284      ( 09 Jul 2019 12:06 )             40.7     Auto Eosinophil # x     / Auto Eosinophil % x     / Auto Neutrophil # x     / Auto Neutrophil % x     / BANDS % x        INR: 1.18 ratio (07-11 @ 04:30)  INR: 1.26 ratio (07-09 @ 12:06)    07-11    134<L>  |  94<L>  |  15  ----------------------------<  105<H>  4.8   |  27  |  0.90  07-10    137  |  94<L>  |  17  ----------------------------<  100<H>  3.8   |  29  |  0.87  07-09    136  |  91<L>  |  19  ----------------------------<  111<H>  3.5   |  32<H>  |  0.84    Ca    9.4      11 Jul 2019 04:30  Mg     2.1     07-11  Phos  3.4     07-11  TPro  7.2  /  Alb  3.3  /  TBili  0.4  /  DBili  x   /  AST  21  /  ALT  44  /  AlkPhos  138<H>  07-09      Lipid Profile: 07-06 Chol 117 LDL 48 HDL 55 Trig 68  HgA1c: 5.6 % (07-05 @ 20:16)    TSH: Thyroid Stimulating Hormone, Serum (07.05.19 @ 20:24)    Thyroid Stimulating Hormone, Serum: 2.26 uIU/mL    CARDIAC MARKERS:   09 Jul 2019 12:06 Troponin x     / Creatine Kinase 26 U/L /  CKMB 1.0 ng/mL / CPK Mass Assay % x          TELEMETRY: 	    ECG:  	  RADIOLOGY:  OTHER: 	    PREVIOUS DIAGNOSTIC TESTING:    [ ] Echocardiogram: < from: TTE with Doppler (w/Cont) (07.06.19 @ 10:06) >  Conclusions:  1. The aortic valve leaflets are mildly thickened.  There  is aortic sclerosis but no stenosis. No aortic valve  regurgitation seen.  2. The left ventricular size is normal  3. There is septal hypokineisis.   The LVEF is about 45%.  4. The right ventricle appears moderately dilated with  mildly reduced function.  5. There is no significantmitral or tricuspid  regurgitation.  6. There is no pericardial effusion.    < end of copied text >    [ ]  Catheterization: < from: Cardiac Cath Lab - Adult (07.08.19 @ 15:35) >  CORONARY VESSELS: The coronary circulation is left dominant.  LM:   --  LM: Normal.  LAD:   --  LAD: Normal.  CX:   --  Circumflex: Normal.  RCA:   --  RCA: Normal.    < end of copied text >   	  	  EMANI VailA[facility] at [facility]GEN[facility]  Contact # [phone] Patient is a 69y old  Male who presents with a chief complaint of syncopal episode (10 Jul 2019 11:09)    HPI:  70 y/o M w/ pmhx of HTN, HLD, MORRO (on CPAP, Home O2 2L), recently diagnosed afib (on cardizem and Eliquis) and Gout presented to [place] after having one syncopal episode at home on 6/29.  At [place], Trops negative and pt had ? vtach vs SVT episode (No EKG available) and now transferred to [facility] for possible EPS. Denies CP, SOB, HA, dizziness or fatigue (05 Jul 2019 12:41)      Overnight Event: RRT called, pt noted to have sustained VT, - 220's lasting approximately 3 minutes, which self- terminated Pt remained asymptomatic during episode. transferred to CCU for closer monitoring.    REVIEW OF SYSTEMS: No chest pain, SOB or palpitations  	    MEDICATIONS  (STANDING):  amLODIPine   Tablet 5 milliGRAM(s) Oral daily  atorvastatin 10 milliGRAM(s) Oral at bedtime  buDESOnide 160 MICROgram(s)/formoterol 4.5 MICROgram(s) Inhaler 2 Puff(s) Inhalation two times a day  chlorhexidine 2% Cloths 1 Application(s) Topical daily  cyanocobalamin 1000 MICROGram(s) Oral daily  docusate sodium 100 milliGRAM(s) Oral three times a day  finasteride 5 milliGRAM(s) Oral daily  furosemide    Tablet 40 milliGRAM(s) Oral daily  heparin  Infusion. 1700 Unit(s)/Hr (17 mL/Hr) IV Continuous <Continuous>  pantoprazole    Tablet 40 milliGRAM(s) Oral before breakfast  polyethylene glycol 3350 17 Gram(s) Oral daily  senna 2 Tablet(s) Oral at bedtime  tamsulosin 0.8 milliGRAM(s) Oral at bedtime  tiotropium 18 MICROgram(s) Capsule 1 Capsule(s) Inhalation daily    MEDICATIONS  (PRN):  ALBUTerol    90 MICROgram(s) HFA Inhaler 2 Puff(s) Inhalation every 6 hours PRN Shortness of Breath and/or Wheezing        PHYSICAL EXAM:  Vital Signs Last 24 Hrs  T(C): 36.9 (11 Jul 2019 07:00), Max: 37.2 (10 Jul 2019 18:30)  T(F): 98.4 (11 Jul 2019 07:00), Max: 99 (10 Jul 2019 18:30)  HR: 78 (11 Jul 2019 07:00) (56 - 94)  BP: 111/64 (11 Jul 2019 07:00) (91/79 - 116/64)  BP(mean): 86 (11 Jul 2019 07:00) (73 - 92)  RR: 17 (11 Jul 2019 07:00) (15 - 24)  SpO2: 94% (11 Jul 2019 07:00) (90% - 97%)  I&O's Summary    10 Jul 2019 07:01  -  11 Jul 2019 07:00  --------------------------------------------------------  IN: 880 mL / OUT: 1000 mL / NET: -120 mL    Appearance: Normal	  HEENT:   Normal oral mucosa, PERRL, EOMI	  Cardiovascular: Normal S1 S2, No JVD, No murmurs, No edema  Respiratory: Lungs diminished at bases, poor inspiratory effort  Psychiatry: A & O x 3, Mood & affect appropriate  Gastrointestinal:  Obese, Soft, Non-tender, + BS	  Skin: No rashes, No ecchymoses, No cyanosis	  Neurologic: Non-focal  Extremities: Normal range of motion, No clubbing, cyanosis or edema  Vascular: Peripheral pulses palpable 2+ bilaterally  Right groin with no hematoma or ecchymosis    LABS:	 	                        14.5   9.6   )-----------( 265      ( 11 Jul 2019 04:30 )             43.0     Auto Eosinophil # 0.4   / Auto Eosinophil % 4.3   / Auto Neutrophil # 5.8   / Auto Neutrophil % 60.4  / BANDS % x                            12.6   9.45  )-----------( 309      ( 10 Jul 2019 10:08 )             40.6     Auto Eosinophil # x     / Auto Eosinophil % x     / Auto Neutrophil # x     / Auto Neutrophil % x     / BANDS % x                            13.7   9.5   )-----------( 284      ( 09 Jul 2019 12:06 )             40.7     Auto Eosinophil # x     / Auto Eosinophil % x     / Auto Neutrophil # x     / Auto Neutrophil % x     / BANDS % x        INR: 1.18 ratio (07-11 @ 04:30)  INR: 1.26 ratio (07-09 @ 12:06)    07-11    134<L>  |  94<L>  |  15  ----------------------------<  105<H>  4.8   |  27  |  0.90  07-10    137  |  94<L>  |  17  ----------------------------<  100<H>  3.8   |  29  |  0.87  07-09    136  |  91<L>  |  19  ----------------------------<  111<H>  3.5   |  32<H>  |  0.84    Ca    9.4      11 Jul 2019 04:30  Mg     2.1     07-11  Phos  3.4     07-11  TPro  7.2  /  Alb  3.3  /  TBili  0.4  /  DBili  x   /  AST  21  /  ALT  44  /  AlkPhos  138<H>  07-09      Lipid Profile: 07-06 Chol 117 LDL 48 HDL 55 Trig 68  HgA1c: 5.6 % (07-05 @ 20:16)    TSH: Thyroid Stimulating Hormone, Serum (07.05.19 @ 20:24)    Thyroid Stimulating Hormone, Serum: 2.26 uIU/mL    CARDIAC MARKERS:   09 Jul 2019 12:06 Troponin x     / Creatine Kinase 26 U/L /  CKMB 1.0 ng/mL / CPK Mass Assay % x          TELEMETRY: 	Occasional PVC's    ECG:  	Atrial Fibrillation  RADIOLOGY:   OTHER: 	    PREVIOUS DIAGNOSTIC TESTING:    [ ] Echocardiogram: < from: TTE with Doppler (w/Cont) (07.06.19 @ 10:06) >  Conclusions:  1. The aortic valve leaflets are mildly thickened.  There  is aortic sclerosis but no stenosis. No aortic valve  regurgitation seen.  2. The left ventricular size is normal  3. There is septal hypokineisis.   The LVEF is about 45%.  4. The right ventricle appears moderately dilated with  mildly reduced function.  5. There is no significant mitral or tricuspid  regurgitation.  6. There is no pericardial effusion.    < end of copied text >    [ ]  Catheterization: < from: Cardiac Cath Lab - Adult (07.08.19 @ 15:35) >  CORONARY VESSELS: The coronary circulation is left dominant.  LM:   --  LM: Normal.  LAD:   --  LAD: Normal.  CX:   --  Circumflex: Normal.  RCA:   --  RCA: Normal.    < end of copied text >   	  	  EMANI VailUSA [facility] Hospital  Contact # [phone]

## 2019-07-11 NOTE — PROGRESS NOTE ADULT - SUBJECTIVE AND OBJECTIVE BOX
INTERVAL HPI/OVERNIGHT EVENTS: Pt seen in bed, noted to have another 30 seconds of symptomatic VT this morning around 9am. Received Lidocaine IVP 150mg, then 75mg and lopressor 5mg IV x 1    MEDICATIONS  (STANDING):  atorvastatin 10 milliGRAM(s) Oral at bedtime  buDESOnide 160 MICROgram(s)/formoterol 4.5 MICROgram(s) Inhaler 2 Puff(s) Inhalation two times a day  chlorhexidine 2% Cloths 1 Application(s) Topical daily  cyanocobalamin 1000 MICROGram(s) Oral daily  docusate sodium 100 milliGRAM(s) Oral three times a day  finasteride 5 milliGRAM(s) Oral daily  furosemide    Tablet 40 milliGRAM(s) Oral daily  heparin  Infusion. 1700 Unit(s)/Hr (17 mL/Hr) IV Continuous <Continuous>  pantoprazole    Tablet 40 milliGRAM(s) Oral before breakfast  polyethylene glycol 3350 17 Gram(s) Oral daily  senna 2 Tablet(s) Oral at bedtime  tamsulosin 0.8 milliGRAM(s) Oral at bedtime  tiotropium 18 MICROgram(s) Capsule 1 Capsule(s) Inhalation daily    MEDICATIONS  (PRN):  ALBUTerol    90 MICROgram(s) HFA Inhaler 2 Puff(s) Inhalation every 6 hours PRN Shortness of Breath and/or Wheezing      Allergies    No Known Allergies    Intolerances      ROS:  General: Pt denies fever/chills  Cardiovascular: denies chest pain/palpitations/leg edema  Respiratory and Thorax: denies SOB/cough/wheezing  Gastrointestinal: denies abdominal pain/diarrhea/constipation/bloody stool  Skin: denies rashes/sores          Vital Signs Last 24 Hrs  T(C): 36.9 (11 Jul 2019 16:00), Max: 37.2 (10 Jul 2019 18:30)  T(F): 98.4 (11 Jul 2019 16:00), Max: 99 (10 Jul 2019 18:30)  HR: 102 (11 Jul 2019 18:00) (56 - 102)  BP: 131/86 (11 Jul 2019 18:00) (82/72 - 131/86)  BP(mean): 103 (11 Jul 2019 18:00) (70 - 118)  RR: 26 (11 Jul 2019 18:00) (11 - 37)  SpO2: 93% (11 Jul 2019 18:00) (88% - 97%)    Physical Exam:  Neurological: Alert & Oriented x 3  Respiratory: CTA B/L  Cardiovascular: (+) S1 & S2, RRR  Gastrointestinal: soft, NT, nondistended, (+) BS  Extremities: No pedal edema    LABS:                        14.5   9.6   )-----------( 265      ( 11 Jul 2019 04:30 )             43.0     07-11    133<L>  |  92<L>  |  15  ----------------------------<  106<H>  3.8   |  27  |  1.05    Ca    8.8      11 Jul 2019 17:08  Phos  3.1     07-11  Mg     2.1     07-11      PT/INR - ( 11 Jul 2019 04:30 )   PT: 13.6 sec;   INR: 1.18 ratio         PTT - ( 11 Jul 2019 04:30 )  PTT:94.0 sec      RADIOLOGY & ADDITIONAL TESTS:    TELE:    EKG:

## 2019-07-11 NOTE — PROGRESS NOTE ADULT - PROBLEM SELECTOR PLAN 4
TTE was performed on 7/6 which showed EF 45%, aortic sclerosis but no stenosis, septal hypokinesis, mildly reduced RV function. Appears euvolemic. not in exacerbation  - c/w Metoprolol  -c/w lasix
continue CPAP at HS
TTE was performed on 7/6 which showed EF 45%, aortic sclerosis but no stenosis, septal hypokinesis, mildly reduced RV function. Appears euvolemic. not in exacerbation  - c/w Metoprolol  -c/w lasix

## 2019-07-11 NOTE — PROGRESS NOTE ADULT - ASSESSMENT
69 year old male with past medical history of HTN, HLD, COPD, MORRO on CPAP at home, recently diagnosed with AFIB on Eliquis/ Cardizem and Gout initally presented to [place] after having syncopal episode at home, loosing consciousness. Pt transferred to [facility] for further workup. S/P Cardiac cath on 7/8- normal coronary arteries S/P RRT -Sustained VT, - 220's lasting approximately 3 minutes, which self- terminated. EP consulted for further managment     # AFIB  # Ventricular Tachycardia  - ECHO: EF 45%, LA volume index 39. Unable to obtain cMRI due to body habitus.    - Tele: AFIB HR 80- 90's, noted to have another 30 seconds of symptomatic VT this morning around 9am. Received Lidocaine IVP 150mg, then 75mg and lopressor 5mg IV x 1  - Continue with Heparin drip for AC  - If VT should re-occur overnight, may give lopressor 5mg IV x 1 dose to help terminate VT  - NPO p MN for VT ablation tomorrow 6/12- Please hold Heparin drip at 6am on 6/12.  - Plan discussed with CCU team         [id number]

## 2019-07-11 NOTE — DISCHARGE NOTE PROVIDER - CARE PROVIDER_API CALL
Toña Chung (MD)  Cardiac Electrophysiology; Cardiovascular Disease; Internal Medicine  [address] [place] [address]  Phone: [phone]  Fax: [phone]  Follow Up Time:

## 2019-07-11 NOTE — DISCHARGE NOTE PROVIDER - NSDCCPCAREPLAN_GEN_ALL_CORE_FT
PRINCIPAL DISCHARGE DIAGNOSIS  Diagnosis: Ventricular tachycardia  Assessment and Plan of Treatment: You were admitted for work up of ventricular tachcardia.  We performed a cardiac catherization to rule out cardiac ischemia as a cause; the study did not show coronary disease.  We attempted to ablate the area but were unable to do so.  You had an ICD placed on 7/15 and were started on a new medication called Sotalol.  The dosage is 160mg twice a day which you should take to avoid further episodes of this arryhtmia.  Please attend your follow up appointemnt with Electrophysiology on 7/29 @ 1:40pm

## 2019-07-11 NOTE — PROGRESS NOTE ADULT - PROBLEM SELECTOR PLAN 5
c/w Proscar and Flomax
continue Proscar  continue Flomax

## 2019-07-11 NOTE — PROGRESS NOTE ADULT - PROBLEM SELECTOR PROBLEM 5
BPH (benign prostatic hyperplasia)
Benign prostatic hyperplasia with post-void dribbling

## 2019-07-11 NOTE — PROGRESS NOTE ADULT - PROBLEM SELECTOR PLAN 1
re-start Lopressor  no amiodarone, plan for VT ablation.  Use Lidocaine if needed Holding Lopressor  no amiodarone, plan for VT ablation.  Use Lidocaine if needed

## 2019-07-11 NOTE — PROGRESS NOTE ADULT - PROBLEM SELECTOR PROBLEM 4
MORRO on CPAP
Systolic heart failure, unspecified HF chronicity

## 2019-07-11 NOTE — PROGRESS NOTE ADULT - ASSESSMENT
69 year old male admitted for a syncopal episode at home, loosing consciousness. Pt transferred to [facility], S/P Cardiac cath on 7/8- normal coronary arteries now S/P RRT for sustained VT, - 220's lasting approximately 3 minutes, which self- terminated.

## 2019-07-11 NOTE — PROGRESS NOTE ADULT - PROBLEM SELECTOR PLAN 2
Eliquis on hold, on heparin drip  re-start lopressor for rate control Eliquis on hold, on heparin drip  Holding lopressor for rate control

## 2019-07-11 NOTE — CHART NOTE - NSCHARTNOTEFT_GEN_A_CORE
====================  CCU MIDNIGHT ROUNDS  ====================    ALICE JUNIOR  [id number]    ====================  SUMMARY: HPI:  70 y/o M w/ pmhx of HTN, HLD, MORRO (on CPAP, Home O2 2L), recently diagnosed afib (on cardizem and Eliquis) and Gout presented to [place] after having one syncopal episode at home on 6/29.  At [place], Trops negative and pt had ? vtach vs SVT episode (No EKG available) and now transferred to [facility] for possible EPS. Denies CP, SOB, HA, dizziness or fatigue (05 Jul 2019 12:41)    ====================        ====================  NEW EVENTS:  ====================  NPO and holding heparin after midnight for VT ablation      ====================  VITALS (Last 12 hrs):  ====================    T(C): 36.7 (07-11-19 @ 19:00), Max: 36.9 (07-11-19 @ 16:00)  HR: 82 (07-11-19 @ 21:30) (75 - 200)  BP: 102/63 (07-11-19 @ 21:30) (79/62 - 131/86)  BP(mean): 79 (07-11-19 @ 21:30) (67 - 118)  RR: 19 (07-11-19 @ 21:30) (15 - 38)  SpO2: 93% (07-11-19 @ 21:30) (88% - 95%)      TELEMETRY: AF rate controlled      I&O's Summary    10 Jul 2019 07:01  -  11 Jul 2019 07:00  --------------------------------------------------------  IN: 880 mL / OUT: 1000 mL / NET: -120 mL    11 Jul 2019 07:01  -  11 Jul 2019 22:17  --------------------------------------------------------  IN: 1001.3 mL / OUT: 950 mL / NET: 51.3 mL        ====================  PLAN:  ====================    #Ventricular tachycardia  - Sustained VT today s/p lido bolus and drip with failure to control  - Breaks immediately with IV Lopressor 5mg  - no amio or lido  - NPO after midnight and hold heparin for VT ablation    #Atrial fibrillation  - Eliquis on hold, cont heparin drip until midnight     #COPD  - 2L NC  - albuterol prn  - continue Symbicort and Spiriva.     #MORRO on CPAP  - continue CPAP overnight    #BPH  - continue Proscar  - continue Flomax    YU PorterC  #[id number]

## 2019-07-11 NOTE — DISCHARGE NOTE PROVIDER - HOSPITAL COURSE
HPI:     70 y/o M w/ pmhx of HTN, HLD, MORRO (on CPAP, Home O2 2L), recently diagnosed afib (on cardizem and Eliquis) and Gout presented to [place] after having one syncopal episode at home on 6/29.  At [place], Trops negative and pt had ? vtach vs SVT episode (No EKG available) and now transferred to [facility] for possible EPS and R/LHC. . Denies CP, SOB, HA, dizziness or fatigue         Hospital Course:    Pt admitted on 7/5 and had R/LHC on 7/6 which showed clean coronaries.  He was monitored on the floor on tele and had multiple RRTs for VTach and     was transferred to the CCU for further monitoring.  EP was consulted and an ablation was attempted on 7/12 however was unsuccessful 2/2 structural difficulties.  He had an ICD placed on 7/15 (Skyfiber) and was started on sotalol which was titrated up to 160mg BID; EKG was monitored for QTc prolongation and pt was stable on this dose; he had no further VT events on tele.  Pt was seen by PT who recommended rehab; on 7/17 pt was determined stable for discharge on 160 BID sotalol for Vtach and has a f/u appt w/ EP on 7/29 @ 1:40pm

## 2019-07-12 LAB
ANION GAP SERPL CALC-SCNC: 10 MMOL/L — SIGNIFICANT CHANGE UP (ref 5–17)
APTT BLD: 74.9 SEC — HIGH (ref 27.5–36.3)
BASOPHILS # BLD AUTO: 0 K/UL — SIGNIFICANT CHANGE UP (ref 0–0.2)
BASOPHILS NFR BLD AUTO: 0.4 % — SIGNIFICANT CHANGE UP (ref 0–2)
BUN SERPL-MCNC: 16 MG/DL — SIGNIFICANT CHANGE UP (ref 7–23)
CALCIUM SERPL-MCNC: 9.2 MG/DL — SIGNIFICANT CHANGE UP (ref 8.4–10.5)
CHLORIDE SERPL-SCNC: 94 MMOL/L — LOW (ref 96–108)
CO2 SERPL-SCNC: 28 MMOL/L — SIGNIFICANT CHANGE UP (ref 22–31)
CREAT SERPL-MCNC: 1.1 MG/DL — SIGNIFICANT CHANGE UP (ref 0.5–1.3)
EOSINOPHIL # BLD AUTO: 0.3 K/UL — SIGNIFICANT CHANGE UP (ref 0–0.5)
EOSINOPHIL NFR BLD AUTO: 2.8 % — SIGNIFICANT CHANGE UP (ref 0–6)
GLUCOSE SERPL-MCNC: 115 MG/DL — HIGH (ref 70–99)
HCT VFR BLD CALC: 41.9 % — SIGNIFICANT CHANGE UP (ref 39–50)
HGB BLD-MCNC: 13.4 G/DL — SIGNIFICANT CHANGE UP (ref 13–17)
INR BLD: 1.19 RATIO — HIGH (ref 0.88–1.16)
LYMPHOCYTES # BLD AUTO: 19.9 % — SIGNIFICANT CHANGE UP (ref 13–44)
LYMPHOCYTES # BLD AUTO: 2 K/UL — SIGNIFICANT CHANGE UP (ref 1–3.3)
MAGNESIUM SERPL-MCNC: 2.2 MG/DL — SIGNIFICANT CHANGE UP (ref 1.6–2.6)
MCHC RBC-ENTMCNC: 25.6 PG — LOW (ref 27–34)
MCHC RBC-ENTMCNC: 31.8 GM/DL — LOW (ref 32–36)
MCV RBC AUTO: 80.4 FL — SIGNIFICANT CHANGE UP (ref 80–100)
MONOCYTES # BLD AUTO: 0.9 K/UL — SIGNIFICANT CHANGE UP (ref 0–0.9)
MONOCYTES NFR BLD AUTO: 9.4 % — SIGNIFICANT CHANGE UP (ref 2–14)
NEUTROPHILS # BLD AUTO: 6.8 K/UL — SIGNIFICANT CHANGE UP (ref 1.8–7.4)
NEUTROPHILS NFR BLD AUTO: 67.6 % — SIGNIFICANT CHANGE UP (ref 43–77)
PHOSPHATE SERPL-MCNC: 3.1 MG/DL — SIGNIFICANT CHANGE UP (ref 2.5–4.5)
PLATELET # BLD AUTO: 250 K/UL — SIGNIFICANT CHANGE UP (ref 150–400)
POTASSIUM SERPL-MCNC: 4 MMOL/L — SIGNIFICANT CHANGE UP (ref 3.5–5.3)
POTASSIUM SERPL-SCNC: 4 MMOL/L — SIGNIFICANT CHANGE UP (ref 3.5–5.3)
PROTHROM AB SERPL-ACNC: 13.6 SEC — HIGH (ref 10–12.9)
RBC # BLD: 5.22 M/UL — SIGNIFICANT CHANGE UP (ref 4.2–5.8)
RBC # FLD: 13.7 % — SIGNIFICANT CHANGE UP (ref 10.3–14.5)
SODIUM SERPL-SCNC: 132 MMOL/L — LOW (ref 135–145)
WBC # BLD: 10.1 K/UL — SIGNIFICANT CHANGE UP (ref 3.8–10.5)
WBC # FLD AUTO: 10.1 K/UL — SIGNIFICANT CHANGE UP (ref 3.8–10.5)

## 2019-07-12 PROCEDURE — 93623 PRGRMD STIMJ&PACG IV RX NFS: CPT | Mod: 26

## 2019-07-12 PROCEDURE — 99233 SBSQ HOSP IP/OBS HIGH 50: CPT | Mod: GC

## 2019-07-12 PROCEDURE — 93654 COMPRE EP EVAL TX VT: CPT

## 2019-07-12 PROCEDURE — 93010 ELECTROCARDIOGRAM REPORT: CPT

## 2019-07-12 PROCEDURE — 71045 X-RAY EXAM CHEST 1 VIEW: CPT | Mod: 26

## 2019-07-12 RX ORDER — HEPARIN SODIUM 5000 [USP'U]/ML
2000 INJECTION INTRAVENOUS; SUBCUTANEOUS
Qty: 25000 | Refills: 0 | Status: DISCONTINUED | OUTPATIENT
Start: 2019-07-12 | End: 2019-07-13

## 2019-07-12 RX ORDER — SOTALOL HCL 120 MG
120 TABLET ORAL EVERY 12 HOURS
Refills: 0 | Status: DISCONTINUED | OUTPATIENT
Start: 2019-07-12 | End: 2019-07-14

## 2019-07-12 RX ORDER — HEPARIN SODIUM 5000 [USP'U]/ML
10000 INJECTION INTRAVENOUS; SUBCUTANEOUS EVERY 6 HOURS
Refills: 0 | Status: DISCONTINUED | OUTPATIENT
Start: 2019-07-12 | End: 2019-07-13

## 2019-07-12 RX ORDER — HEPARIN SODIUM 5000 [USP'U]/ML
5000 INJECTION INTRAVENOUS; SUBCUTANEOUS EVERY 6 HOURS
Refills: 0 | Status: DISCONTINUED | OUTPATIENT
Start: 2019-07-12 | End: 2019-07-13

## 2019-07-12 RX ORDER — HEPARIN SODIUM 5000 [USP'U]/ML
10000 INJECTION INTRAVENOUS; SUBCUTANEOUS ONCE
Refills: 0 | Status: DISCONTINUED | OUTPATIENT
Start: 2019-07-12 | End: 2019-07-13

## 2019-07-12 RX ADMIN — PREGABALIN 1000 MICROGRAM(S): 225 CAPSULE ORAL at 09:30

## 2019-07-12 RX ADMIN — PANTOPRAZOLE SODIUM 40 MILLIGRAM(S): 20 TABLET, DELAYED RELEASE ORAL at 05:06

## 2019-07-12 RX ADMIN — HEPARIN SODIUM 2000 UNIT(S)/HR: 5000 INJECTION INTRAVENOUS; SUBCUTANEOUS at 05:07

## 2019-07-12 RX ADMIN — FINASTERIDE 5 MILLIGRAM(S): 5 TABLET, FILM COATED ORAL at 09:30

## 2019-07-12 RX ADMIN — TIOTROPIUM BROMIDE 1 CAPSULE(S): 18 CAPSULE ORAL; RESPIRATORY (INHALATION) at 12:19

## 2019-07-12 RX ADMIN — BUDESONIDE AND FORMOTEROL FUMARATE DIHYDRATE 2 PUFF(S): 160; 4.5 AEROSOL RESPIRATORY (INHALATION) at 06:13

## 2019-07-12 RX ADMIN — Medication 120 MILLIGRAM(S): at 22:18

## 2019-07-12 RX ADMIN — Medication 40 MILLIGRAM(S): at 05:05

## 2019-07-12 RX ADMIN — ATORVASTATIN CALCIUM 10 MILLIGRAM(S): 80 TABLET, FILM COATED ORAL at 22:18

## 2019-07-12 RX ADMIN — TAMSULOSIN HYDROCHLORIDE 0.8 MILLIGRAM(S): 0.4 CAPSULE ORAL at 22:18

## 2019-07-12 NOTE — PROGRESS NOTE ADULT - ASSESSMENT
69 year old male admitted for a syncopal episode at home, loosing consciousness. Pt transferred to [facility], S/P Cardiac cath on 7/8- normal coronary arteries now S/P RRT for sustained VT, - 220's lasting approximately 3 minutes, which self- terminated

## 2019-07-12 NOTE — PROGRESS NOTE ADULT - SUBJECTIVE AND OBJECTIVE BOX
24H hour events: Pt without complaints, was given Lidocaine bolus 7/11am but none since. Tele: Afib at 70-90's, PVCs    MEDICATIONS:  furosemide    Tablet 40 milliGRAM(s) Oral daily  heparin  Infusion. 1700 Unit(s)/Hr IV Continuous <Continuous> (held since midnight)  tamsulosin 0.8 milliGRAM(s) Oral at bedtime    ALBUTerol    90 MICROgram(s) HFA Inhaler 2 Puff(s) Inhalation every 6 hours PRN  buDESOnide 160 MICROgram(s)/formoterol 4.5 MICROgram(s) Inhaler 2 Puff(s) Inhalation two times a day  tiotropium 18 MICROgram(s) Capsule 1 Capsule(s) Inhalation daily    docusate sodium 100 milliGRAM(s) Oral three times a day  pantoprazole    Tablet 40 milliGRAM(s) Oral before breakfast  polyethylene glycol 3350 17 Gram(s) Oral daily  senna 2 Tablet(s) Oral at bedtime    atorvastatin 10 milliGRAM(s) Oral at bedtime  finasteride 5 milliGRAM(s) Oral daily    chlorhexidine 2% Cloths 1 Application(s) Topical daily  cyanocobalamin 1000 MICROGram(s) Oral daily      REVIEW OF SYSTEMS:  Complete 10point ROS negative.    PHYSICAL EXAM:  T(C): 36.7 (07-12-19 @ 07:00), Max: 36.9 (07-11-19 @ 16:00)  HR: 74 (07-12-19 @ 09:00) (61 - 200)  BP: 109/73 (07-12-19 @ 09:00) (79/62 - 131/86)  RR: 16 (07-12-19 @ 09:00) (11 - 38)  SpO2: 93% (07-12-19 @ 09:00) (88% - 97%)  Wt(kg): --  I&O's Summary    11 Jul 2019 07:01  -  12 Jul 2019 07:00  --------------------------------------------------------  IN: 1481.3 mL / OUT: 1750 mL / NET: -268.7 mL    12 Jul 2019 07:01  -  12 Jul 2019 09:42  --------------------------------------------------------  IN: 0 mL / OUT: 0 mL / NET: 0 mL      Appearance: NAD	  Neck: Supple  Cardiovascular: Normal S1 S2, Irregular   Respiratory: Lungs clear to auscultation with decreased BS at RLL	  Psychiatry: A & O x 3, Mood & affect appropriate  Gastrointestinal:  Obese, Soft, Non-tender, + BS	  Skin: No rashes, No ecchymoses, No cyanosis	  Neurologic: Non-focal  Extremities: Normal range of motion, No clubbing, cyanosis or edema  Vascular: Peripheral pulses palpable 2+ bilaterally      LABS:	 	    CBC Full  -  ( 12 Jul 2019 04:43 )  WBC Count : 10.1 K/uL  Hemoglobin : 13.4 g/dL  Hematocrit : 41.9 %  Platelet Count - Automated : 250 K/uL  Mean Cell Volume : 80.4 fl  Mean Cell Hemoglobin : 25.6 pg  Mean Cell Hemoglobin Concentration : 31.8 gm/dL  Auto Neutrophil # : 6.8 K/uL  Auto Lymphocyte # : 2.0 K/uL  Auto Monocyte # : 0.9 K/uL  Auto Eosinophil # : 0.3 K/uL  Auto Basophil # : 0.0 K/uL  Auto Neutrophil % : 67.6 %  Auto Lymphocyte % : 19.9 %  Auto Monocyte % : 9.4 %  Auto Eosinophil % : 2.8 %  Auto Basophil % : 0.4 %    07-12    132<L>  |  94<L>  |  16  ----------------------------<  115<H>  4.0   |  28  |  1.10  07-11    133<L>  |  92<L>  |  15  ----------------------------<  106<H>  3.8   |  27  |  1.05    Ca    9.2      12 Jul 2019 04:43  Ca    8.8      11 Jul 2019 17:08  Phos  3.1     07-12  Phos  3.1     07-11  Mg     2.2     07-12  Mg     2.1     07-11      TELEMETRY: Afib at 70-90's, PVCs (had NSVT 7/11 morning)   	    < from: TTE with Doppler (w/Cont) (07.06.19 @ 10:06) >  Dimensions:    Normal Values:  LA:     4.7    2.0 - 4.0 cm  Ao:     3.2    2.0 - 3.8 cm  SEPTUM: 1.4    0.6 - 1.2 cm  PWT:    1.2    0.6 - 1.1 cm  LVIDd:  5.4    3.0 - 5.6 cm  LVIDs:  4.2    1.8 - 4.0 cm  Derived variables:  LVMI: 119 g/m2  RWT: 0.44  EF (Teicholtz): 43 %  Doppler Peak Velocity (m/sec): MV=1.0 AoV=1.3  ------------------------------------------------------------------------  Observations:  Mitral Valve: There is mitral annular calcification. There  is no mitral regurgitation. Peak mitral valve gradient  equals 4 mm Hg, mean transmitral valve gradient equals 1 mm  Hg.  Aortic Valve/Aorta: The aortic valve leaflets are mildly  thickened.  There is aortic sclerosis but no stenosis. Peak  transaortic valve gradient equals 7 mm Hg, mean transaortic  valve gradient equals 3 mm Hg, aortic valve velocity time  integral equals 24 cm, estimated aortic valve area equals  2.3 sqcm. No aortic valve regurgitation seen. Peak left  ventricular outflow tract gradient equals 2 mm Hg, LVOT  velocity time integral equals 13 cm.  Aortic Root: 3.2 cm.  Ascending Aorta: 3.2 cm.  LVOT diameter: 2.4 cm.  Left Atrium: Mildly dilated left atrium.  LA volume index =  39 cc/m2.  Left Ventricle: There is septal hypokineisis.   The LVEF is  about 45%. The left ventricular size is normal  Right Heart: The right atrium is severely dilated.  The  right atrial area= 30cm2. The right ventricle appears  moderately dilated with mildly reduced function.  Minimal  tricuspid regurgitation.  No pulmonic regurgitation.  Pericardium/Pleura: There is no pericardial effusion.  Hemodynamic: Estimated right ventricular systolic pressure  equals 26 mm Hg, assuming right atrial pressure equals 8 mmHg, consistent with normal pulmonary pressures.  ------------------------------------------------------------------------  Conclusions:  1. The aortic valve leaflets are mildly thickened.  There  is aortic sclerosis but no stenosis. No aortic valve  regurgitation seen.  2. The left ventricular size is normal  3. There is septal hypokineisis.   The LVEF is about 45%.  4. The right ventricle appears moderately dilated with  mildly reduced function.  5. There is no significantmitral or tricuspid  regurgitation.  6. There is no pericardial effusion.      < from: Cardiac Cath Lab - Adult (07.08.19 @ 15:35) >  PROCEDURE:  --  Left coronary angiography.  --  Right coronary angiography.  --  Sonosite - Diagnostic.  --  Hemostasis with Angioseal.  TECHNIQUE: The risks and alternatives of the procedures and conscious  sedation were explained to the patient and informed consent was obtained.  Cardiac catheterization performed electively.  Local anesthetic given. Right femoral artery access. Left coronary artery  angiography. The vessel was injected utilizing a catheter. Right coronary  artery angiography. The vessel was injected utilizing a catheter. Sonosite  - Diagnostic. Hemostasis with Angioseal. RADIATION EXPOSURE: 2.1 min.  CONTRAST GIVEN: Omnipaque 36 ml.  MEDICATIONS GIVEN: Midazolam, 1 mg, IV. Fentanyl, 25 mcg, IV.  CORONARY VESSELS: The coronary circulation is left dominant.  LM:   --  LM: Normal.  LAD:   --  LAD: Normal.  CX:   --  Circumflex: Normal.  RCA:   --  RCA: Normal.  COMPLICATIONS: There were no complications.  DIAGNOSTIC RECOMMENDATIONS: Thepatient should continue with the present  medications.

## 2019-07-12 NOTE — CHART NOTE - NSCHARTNOTEFT_GEN_A_CORE
Pre-op Diagnosis: Ventricular Tachycardia    Post-op Diagnosis: Ventricular Tachycardia    Procedure: VT Ablation    Electrophysiologist: Reji Morrison MD    Assistant: Ray Grimes MD    Anesthesia: MD Nahum    Access:  RFA 8Fr  RFV 8Fr and 5Fr  LFV 8Fr x2    Description:  Retrograde aortic approach was initially attempted but was difficult due to his tortuous anatomy, so a transeptal approach was performed. The LV was mapped and geometry was created. The VT was induced, and mapped to the subaortic regions. His subaortic area appears to have a significant scar. We were unable to manipulate our catheter through the transeptal to that area. So a long sheath was inserted into the arterial access, and we were to successfully approach the LV retrograde. Further mapping and ablation was performed in the subaortic region in the area of scar that extends to the septum. Arrythmia was difficult to induce but still present.     Complications: None    EBL: <10 mL    Disposition: CCU     Plan:  - Start Heparin drip for afib 6 hours after sheaths are pulled  - Restart Eliquis tomorrow morning  - Start Sotalol 120mg BID and monitor QTc prior to each dose.  - Recommend diuresis with Lasix IV 40mg, monitor I/Os  - Check CXR tonight  - Given the subaortic scar and inducible VT, as well as his presentation of syncope, the patient will need an ICD prior to discharge.

## 2019-07-12 NOTE — PROGRESS NOTE ADULT - SUBJECTIVE AND OBJECTIVE BOX
CCU NOTE    Admission date: 7/5/19  Chief complaint/ Diagnosis  HPI: 69 year old male admitted for a syncopal episode at home, loosing consciousness. Pt transferred to [facility], S/P Cardiac cath on 7/8- normal coronary arteries now S/P RRT for sustained VT, - 220's lasting approximately 3 minutes, which self- terminated    Interval history: + vt episode s/p 5mg IVP lopressor x 1  Denies CP , Palpitation or SOB     MEDICATIONS  (STANDING)  atorvastatin 10 milliGRAM(s) Oral at bedtime  buDESOnide 160 MICROgram(s)/formoterol 4.5 MICROgram(s) Inhaler 2 Puff(s) Inhalation two times a day  chlorhexidine 2% Cloths 1 Application(s) Topical daily  cyanocobalamin 1000 MICROGram(s) Oral daily  docusate sodium 100 milliGRAM(s) Oral three times a day  finasteride 5 milliGRAM(s) Oral daily  furosemide    Tablet 40 milliGRAM(s) Oral daily  heparin  Infusion. 1700 Unit(s)/Hr (17 mL/Hr) IV Continuous <Continuous>  pantoprazole    Tablet 40 milliGRAM(s) Oral before breakfast  polyethylene glycol 3350 17 Gram(s) Oral daily  senna 2 Tablet(s) Oral at bedtime  tamsulosin 0.8 milliGRAM(s) Oral at bedtime  tiotropium 18 MICROgram(s) Capsule 1 Capsule(s) Inhalation daily    MEDICATIONS  (PRN):  ALBUTerol    90 MICROgram(s) HFA Inhaler 2 Puff(s) Inhalation every 6 hours PRN Shortness of Breath and/or Wheezing    FAMILY HISTORY:  No pertinent family history in first degree relatives  No pertinent family history in first degree relatives    Allergy: No Known Allergies    ICU Vital Signs Last 24 Hrs  T(C): 36.7 (Max: 36.9)  HR: 70 (61 - 200)  BP: 112/65 (79/62 - 131/86)  RR: 16  (11 - 38)  SpO2: 93% (88% - 97%)    I&O's Summary  Intake 1481 output 1750 Net -280    PHYSICAL EXAM  Appearance: Normal, NAD  HEAD:  Atraumatic, Normocephalic  EYES: EOMI, PERRLA, conjunctiva and sclera clear  NECK: Supple, No JVD  CHEST/LUNG: Clear to auscultation bilaterally; No wheezing  HEART: Normal S1, S2. No murmurs, rubs, or gallops  ABDOMEN: + Bowel sounds, Soft, NT, ND   EXTREMITIES:  2+ Peripheral Pulses, No clubbing, cyanosis, or edema  NEUROLOGY: non-focal, aaox3  Lymphatic: No lymphadenopathy  SKIN: No rashes or lesions    Interpretation of Telemetry:                      13.4 <14.5  10.1  )-----------( 250                41.9       132<L>  |  94<L>  |  16  ----------------------------<  115<H>  4.0   |  28  |  1.10<1.05<0.90    Ca    9.2     Phos  3.1     Mg     2.2 CCU NOTE    Admission date: 7/5/19  Chief complaint/ Diagnosis  HPI: 69 year old male admitted for a syncopal episode at home, loosing consciousness. Pt transferred to [facility], S/P Cardiac cath on 7/8- normal coronary arteries now S/P RRT for sustained VT, - 220's lasting approximately 3 minutes, which self- terminated    Interval history: + multiple vt episodes(last episode 6:50pm) s/p 5mg IVP lopressor x 1  Denies CP , Palpitation or SOB     MEDICATIONS  (STANDING)  atorvastatin 10 milliGRAM(s) Oral at bedtime  buDESOnide 160 MICROgram(s)/formoterol 4.5 MICROgram(s) Inhaler 2 Puff(s) Inhalation two times a day  chlorhexidine 2% Cloths 1 Application(s) Topical daily  cyanocobalamin 1000 MICROGram(s) Oral daily  docusate sodium 100 milliGRAM(s) Oral three times a day  finasteride 5 milliGRAM(s) Oral daily  furosemide    Tablet 40 milliGRAM(s) Oral daily  heparin  Infusion. 1700 Unit(s)/Hr (17 mL/Hr) IV Continuous <Continuous>  pantoprazole    Tablet 40 milliGRAM(s) Oral before breakfast  polyethylene glycol 3350 17 Gram(s) Oral daily  senna 2 Tablet(s) Oral at bedtime  tamsulosin 0.8 milliGRAM(s) Oral at bedtime  tiotropium 18 MICROgram(s) Capsule 1 Capsule(s) Inhalation daily    MEDICATIONS  (PRN):  ALBUTerol    90 MICROgram(s) HFA Inhaler 2 Puff(s) Inhalation every 6 hours PRN Shortness of Breath and/or Wheezing    FAMILY HISTORY:  No pertinent family history in first degree relatives  No pertinent family history in first degree relatives    Allergy: No Known Allergies    ICU Vital Signs Last 24 Hrs  T(C): 36.7 (Max: 36.9)  HR: 70 (61 - 200)  BP: 112/65 (79/62 - 131/86)  RR: 16  (11 - 38)  SpO2: 93% (88% - 97%)    I&O's Summary  Intake 1481 output 1750 Net -280    PHYSICAL EXAM  Appearance: Normal, NAD  HEAD:  Atraumatic, Normocephalic  EYES: EOMI, PERRLA, conjunctiva and sclera clear  NECK: Supple, No JVD  CHEST/LUNG: Clear to auscultation bilaterally; No wheezing  HEART: Normal S1, S2. No murmurs, rubs, or gallops  ABDOMEN: + Bowel sounds, Soft, NT, ND   EXTREMITIES:  2+ Peripheral Pulses, No clubbing, cyanosis, or edema  NEUROLOGY: non-focal, aaox3  Lymphatic: No lymphadenopathy  SKIN: No rashes or lesions    Interpretation of Telemetry: afib underlying w/ multiple pvcs                      13.4 <14.5  10.1  )-----------( 250                41.9       132<133  |  94<L>  |  16  ----------------------------<  115<H>  4.0   |  28  |  1.10<1.05<0.90    Ca    9.2     Phos  3.1     Mg     2.2

## 2019-07-12 NOTE — PROGRESS NOTE ADULT - PROBLEM SELECTOR PLAN 1
one episode of VT last night s/p lopressor 5mg IVP x 1  Cont Tele monitor   NPO since MN for VT ablation 7/12  Lidocaine if needed  Follow up w/EP REC

## 2019-07-12 NOTE — PROGRESS NOTE ADULT - ASSESSMENT
69 year old male with past medical history of HTN, HLD, COPD, MORRO on CPAP/3L oxygen at home, recently diagnosed with AFIB on Eliquis/ Cardizem and Gout initially presented to [facility] [place] after having syncopal episode at home, transferred to [facility] for further workup. S/P Cardiac cath on 7/8- normal coronary arteries. S/P RRT 7/10/19 for symptomatic sustained VT, - 220's lasting approximately 3 minutes, which self- terminated. EP consulted for further management.     # AFIB  # Ventricular Tachycardia  - ECHO: EF 45%, LA volume index 39. Unable to obtain cMRI due to body habitus.    - Tele: AFIB HR 70-90's with PVCs, no further VT since yesterday morning   - Heparin drip was held at midnight   - NPO for VT ablation today  - Will address A/C and AAD post ablation    [id number]

## 2019-07-13 LAB
ANION GAP SERPL CALC-SCNC: 12 MMOL/L — SIGNIFICANT CHANGE UP (ref 5–17)
APTT BLD: 75.2 SEC — HIGH (ref 27.5–36.3)
BASOPHILS # BLD AUTO: 0 K/UL — SIGNIFICANT CHANGE UP (ref 0–0.2)
BASOPHILS NFR BLD AUTO: 0.1 % — SIGNIFICANT CHANGE UP (ref 0–2)
BUN SERPL-MCNC: 14 MG/DL — SIGNIFICANT CHANGE UP (ref 7–23)
CALCIUM SERPL-MCNC: 8.7 MG/DL — SIGNIFICANT CHANGE UP (ref 8.4–10.5)
CHLORIDE SERPL-SCNC: 96 MMOL/L — SIGNIFICANT CHANGE UP (ref 96–108)
CO2 SERPL-SCNC: 27 MMOL/L — SIGNIFICANT CHANGE UP (ref 22–31)
CREAT SERPL-MCNC: 0.83 MG/DL — SIGNIFICANT CHANGE UP (ref 0.5–1.3)
EOSINOPHIL # BLD AUTO: 0.1 K/UL — SIGNIFICANT CHANGE UP (ref 0–0.5)
EOSINOPHIL NFR BLD AUTO: 0.8 % — SIGNIFICANT CHANGE UP (ref 0–6)
GLUCOSE SERPL-MCNC: 104 MG/DL — HIGH (ref 70–99)
HCT VFR BLD CALC: 41.8 % — SIGNIFICANT CHANGE UP (ref 39–50)
HGB BLD-MCNC: 12.7 G/DL — LOW (ref 13–17)
INR BLD: 1.18 RATIO — HIGH (ref 0.88–1.16)
LYMPHOCYTES # BLD AUTO: 1.4 K/UL — SIGNIFICANT CHANGE UP (ref 1–3.3)
LYMPHOCYTES # BLD AUTO: 8.8 % — LOW (ref 13–44)
MAGNESIUM SERPL-MCNC: 2 MG/DL — SIGNIFICANT CHANGE UP (ref 1.6–2.6)
MCHC RBC-ENTMCNC: 24.8 PG — LOW (ref 27–34)
MCHC RBC-ENTMCNC: 30.3 GM/DL — LOW (ref 32–36)
MCV RBC AUTO: 81.8 FL — SIGNIFICANT CHANGE UP (ref 80–100)
MONOCYTES # BLD AUTO: 1.6 K/UL — HIGH (ref 0–0.9)
MONOCYTES NFR BLD AUTO: 10.3 % — SIGNIFICANT CHANGE UP (ref 2–14)
NEUTROPHILS # BLD AUTO: 12.3 K/UL — HIGH (ref 1.8–7.4)
NEUTROPHILS NFR BLD AUTO: 79.9 % — HIGH (ref 43–77)
PHOSPHATE SERPL-MCNC: 3.4 MG/DL — SIGNIFICANT CHANGE UP (ref 2.5–4.5)
PLATELET # BLD AUTO: 241 K/UL — SIGNIFICANT CHANGE UP (ref 150–400)
POTASSIUM SERPL-MCNC: 4.5 MMOL/L — SIGNIFICANT CHANGE UP (ref 3.5–5.3)
POTASSIUM SERPL-SCNC: 4.5 MMOL/L — SIGNIFICANT CHANGE UP (ref 3.5–5.3)
PROTHROM AB SERPL-ACNC: 13.6 SEC — HIGH (ref 10–12.9)
RBC # BLD: 5.11 M/UL — SIGNIFICANT CHANGE UP (ref 4.2–5.8)
RBC # FLD: 13.9 % — SIGNIFICANT CHANGE UP (ref 10.3–14.5)
SODIUM SERPL-SCNC: 135 MMOL/L — SIGNIFICANT CHANGE UP (ref 135–145)
WBC # BLD: 15.3 K/UL — HIGH (ref 3.8–10.5)
WBC # FLD AUTO: 15.3 K/UL — HIGH (ref 3.8–10.5)

## 2019-07-13 PROCEDURE — 99233 SBSQ HOSP IP/OBS HIGH 50: CPT | Mod: GC

## 2019-07-13 PROCEDURE — 93010 ELECTROCARDIOGRAM REPORT: CPT | Mod: 77

## 2019-07-13 PROCEDURE — 93010 ELECTROCARDIOGRAM REPORT: CPT

## 2019-07-13 RX ORDER — METOPROLOL TARTRATE 50 MG
5 TABLET ORAL ONCE
Refills: 0 | Status: COMPLETED | OUTPATIENT
Start: 2019-07-13 | End: 2019-07-13

## 2019-07-13 RX ORDER — APIXABAN 2.5 MG/1
5 TABLET, FILM COATED ORAL EVERY 12 HOURS
Refills: 0 | Status: DISCONTINUED | OUTPATIENT
Start: 2019-07-13 | End: 2019-07-14

## 2019-07-13 RX ORDER — METOPROLOL TARTRATE 50 MG
5 TABLET ORAL ONCE
Refills: 0 | Status: DISCONTINUED | OUTPATIENT
Start: 2019-07-13 | End: 2019-07-13

## 2019-07-13 RX ORDER — HEPARIN SODIUM 5000 [USP'U]/ML
2000 INJECTION INTRAVENOUS; SUBCUTANEOUS
Qty: 25000 | Refills: 0 | Status: DISCONTINUED | OUTPATIENT
Start: 2019-07-13 | End: 2019-07-13

## 2019-07-13 RX ADMIN — Medication 5 MILLIGRAM(S): at 21:20

## 2019-07-13 RX ADMIN — FINASTERIDE 5 MILLIGRAM(S): 5 TABLET, FILM COATED ORAL at 12:40

## 2019-07-13 RX ADMIN — POLYETHYLENE GLYCOL 3350 17 GRAM(S): 17 POWDER, FOR SOLUTION ORAL at 12:39

## 2019-07-13 RX ADMIN — Medication 40 MILLIGRAM(S): at 05:38

## 2019-07-13 RX ADMIN — BUDESONIDE AND FORMOTEROL FUMARATE DIHYDRATE 2 PUFF(S): 160; 4.5 AEROSOL RESPIRATORY (INHALATION) at 17:45

## 2019-07-13 RX ADMIN — APIXABAN 5 MILLIGRAM(S): 2.5 TABLET, FILM COATED ORAL at 21:06

## 2019-07-13 RX ADMIN — PANTOPRAZOLE SODIUM 40 MILLIGRAM(S): 20 TABLET, DELAYED RELEASE ORAL at 05:38

## 2019-07-13 RX ADMIN — TAMSULOSIN HYDROCHLORIDE 0.8 MILLIGRAM(S): 0.4 CAPSULE ORAL at 21:06

## 2019-07-13 RX ADMIN — ATORVASTATIN CALCIUM 10 MILLIGRAM(S): 80 TABLET, FILM COATED ORAL at 21:06

## 2019-07-13 RX ADMIN — Medication 5 MILLIGRAM(S): at 21:40

## 2019-07-13 RX ADMIN — Medication 100 MILLIGRAM(S): at 21:06

## 2019-07-13 RX ADMIN — APIXABAN 5 MILLIGRAM(S): 2.5 TABLET, FILM COATED ORAL at 07:44

## 2019-07-13 RX ADMIN — SENNA PLUS 2 TABLET(S): 8.6 TABLET ORAL at 21:06

## 2019-07-13 RX ADMIN — HEPARIN SODIUM 2000 UNIT(S)/HR: 5000 INJECTION INTRAVENOUS; SUBCUTANEOUS at 00:30

## 2019-07-13 RX ADMIN — CHLORHEXIDINE GLUCONATE 1 APPLICATION(S): 213 SOLUTION TOPICAL at 21:06

## 2019-07-13 RX ADMIN — Medication 120 MILLIGRAM(S): at 10:53

## 2019-07-13 RX ADMIN — BUDESONIDE AND FORMOTEROL FUMARATE DIHYDRATE 2 PUFF(S): 160; 4.5 AEROSOL RESPIRATORY (INHALATION) at 06:40

## 2019-07-13 RX ADMIN — Medication 120 MILLIGRAM(S): at 21:06

## 2019-07-13 RX ADMIN — PREGABALIN 1000 MICROGRAM(S): 225 CAPSULE ORAL at 12:39

## 2019-07-13 RX ADMIN — Medication 100 MILLIGRAM(S): at 04:24

## 2019-07-13 RX ADMIN — HEPARIN SODIUM 2000 UNIT(S)/HR: 5000 INJECTION INTRAVENOUS; SUBCUTANEOUS at 00:15

## 2019-07-13 RX ADMIN — TIOTROPIUM BROMIDE 1 CAPSULE(S): 18 CAPSULE ORAL; RESPIRATORY (INHALATION) at 12:22

## 2019-07-13 NOTE — PROGRESS NOTE ADULT - SUBJECTIVE AND OBJECTIVE BOX
CCU NOTE    Admission date: 7/5/19  Chief complaint/ Diagnosis vtach  HPI: 69 year old male admitted for a syncopal episode at home, loosing consciousness. Pt transferred to [facility], S/P Cardiac cath on 7/8- normal coronary arteries now S/P RRT for sustained VT, - 220's lasting approximately 3 minutes, which self- terminated    Interval history: s/p vt ablation  Sotalolol 120 BID started    Denies CP , Palpitation or SOB     MEDICATIONS  (STANDING):  apixaban 5 milliGRAM(s) Oral every 12 hours  atorvastatin 10 milliGRAM(s) Oral at bedtime  buDESOnide 160 MICROgram(s)/formoterol 4.5 MICROgram(s) Inhaler 2 Puff(s) Inhalation two times a day  chlorhexidine 2% Cloths 1 Application(s) Topical daily  cyanocobalamin 1000 MICROGram(s) Oral daily  docusate sodium 100 milliGRAM(s) Oral three times a day  finasteride 5 milliGRAM(s) Oral daily  furosemide    Tablet 40 milliGRAM(s) Oral daily  heparin  Infusion. 2000 Unit(s)/Hr (20 mL/Hr) IV Continuous <Continuous>  pantoprazole    Tablet 40 milliGRAM(s) Oral before breakfast  polyethylene glycol 3350 17 Gram(s) Oral daily  senna 2 Tablet(s) Oral at bedtime  sotalol 120 milliGRAM(s) Oral every 12 hours  tamsulosin 0.8 milliGRAM(s) Oral at bedtime  tiotropium 18 MICROgram(s) Capsule 1 Capsule(s) Inhalation daily    MEDICATIONS  (PRN):  ALBUTerol    90 MICROgram(s) HFA Inhaler 2 Puff(s) Inhalation every 6 hours PRN Shortness of Breath and/or Wheezing  benzonatate 100 milliGRAM(s) Oral three times a day PRN Cough  guaiFENesin    Syrup 100 milliGRAM(s) Oral every 6 hours PRN Cough      PAST MEDICAL & SURGICAL HISTORY:  COPD (chronic obstructive pulmonary disease)  HTN (hypertension)  Simple chronic bronchitis  Essential hypertension  No significant past surgical history  No significant past surgical history  FAMILY HISTORY:  No pertinent family history in first degree relatives  No pertinent family history in first degree relatives    No Known Allergies      ICU Vital Signs Last 24 Hrs  T(C): 36.7 (Max: 36.9)  HR: 72  (60 - 84)  BP: 111/66 (91/60 - 136/78)  RR: 22  (15 - 26)  SpO2: 95% (93% - 100%) in room air     I&O's Summary  IN: 420 mL / OUT: 900 mL / NET: -480 mL    PHYSICAL EXAM  Appearance: Normal, NAD  HEAD:  Atraumatic, Normocephalic  EYES: PERRLA, conjunctiva and sclera clear  NECK: Supple, No JVD  CHEST/LUNG: Clear to auscultation bilaterally; No wheezing  HEART: Normal S1, S2. No murmurs, rubs, or gallops  ABDOMEN: + Bowel sounds, Soft, NT, ND   EXTREMITIES:  2+ Peripheral Pulses, No clubbing, cyanosis, or edema  NEUROLOGY: non-focal, aaox3  SKIN: No rashes or lesions    Interpretation of Telemetry:                       12.7   15.3  )-----------( 241                  41.8       135  |  96  |  14  -----------------------<  104<H>  4.5   |  27  |  0.83    Ca    8.7     Phos  3.4     Mg     2.0        < from: TTE with Doppler (w/Cont) (07.06.19 @ 10:06) >  1. The aortic valve leaflets are mildly thickened.  There  is aortic sclerosis but no stenosis. No aortic valve  regurgitation seen.  2. The left ventricular size is normal  3. There is septal hypokineisis.   The LVEF is about 45%.  4. The right ventricle appears moderately dilated with  mildly reduced function.  5. There is no significantmitral or tricuspid  regurgitation.  6. There is no pericardial effusion. CCU NOTE    Admission date: 7/5/19  Chief complaint/ Diagnosis vtach  HPI: 69 year old male admitted for a syncopal episode at home, loosing consciousness. Pt transferred to [facility], S/P Cardiac cath on 7/8- normal coronary arteries now S/P RRT for sustained VT, - 220's lasting approximately 3 minutes, which self- terminated    Interval history: s/p vt ablation unsuccessful  Sotalolol 120 BID started    Denies CP , Palpitation or SOB     MEDICATIONS  (STANDING):  apixaban 5 milliGRAM(s) Oral every 12 hours  atorvastatin 10 milliGRAM(s) Oral at bedtime  buDESOnide 160 MICROgram(s)/formoterol 4.5 MICROgram(s) Inhaler 2 Puff(s) Inhalation two times a day  chlorhexidine 2% Cloths 1 Application(s) Topical daily  cyanocobalamin 1000 MICROGram(s) Oral daily  docusate sodium 100 milliGRAM(s) Oral three times a day  finasteride 5 milliGRAM(s) Oral daily  furosemide    Tablet 40 milliGRAM(s) Oral daily  heparin  Infusion. 2000 Unit(s)/Hr (20 mL/Hr) IV Continuous <Continuous>  pantoprazole    Tablet 40 milliGRAM(s) Oral before breakfast  polyethylene glycol 3350 17 Gram(s) Oral daily  senna 2 Tablet(s) Oral at bedtime  sotalol 120 milliGRAM(s) Oral every 12 hours  tamsulosin 0.8 milliGRAM(s) Oral at bedtime  tiotropium 18 MICROgram(s) Capsule 1 Capsule(s) Inhalation daily    MEDICATIONS  (PRN):  ALBUTerol    90 MICROgram(s) HFA Inhaler 2 Puff(s) Inhalation every 6 hours PRN Shortness of Breath and/or Wheezing  benzonatate 100 milliGRAM(s) Oral three times a day PRN Cough  guaiFENesin    Syrup 100 milliGRAM(s) Oral every 6 hours PRN Cough      PAST MEDICAL & SURGICAL HISTORY:  COPD (chronic obstructive pulmonary disease)  HTN (hypertension)  Simple chronic bronchitis  Essential hypertension  No significant past surgical history  No significant past surgical history  FAMILY HISTORY:  No pertinent family history in first degree relatives  No pertinent family history in first degree relatives    No Known Allergies      ICU Vital Signs Last 24 Hrs  T(C): 36.7 (Max: 36.9)  HR: 72  (60 - 84)  BP: 111/66 (91/60 - 136/78)  RR: 22  (15 - 26)  SpO2: 95% (93% - 100%) in room air     I&O's Summary  IN: 420 mL / OUT: 900 mL / NET: -480 mL    PHYSICAL EXAM  Appearance: Normal, NAD  HEAD:  Atraumatic, Normocephalic  EYES: PERRLA, conjunctiva and sclera clear  NECK: Supple, No JVD  CHEST/LUNG: Clear to auscultation bilaterally; No wheezing  HEART: Normal S1, S2. No murmurs, rubs, or gallops  ABDOMEN: + Bowel sounds, Soft, NT, ND   EXTREMITIES:  2+ Peripheral Pulses, No clubbing, cyanosis, or edema  NEUROLOGY: non-focal, aaox3  SKIN: No rashes or lesions    Interpretation of Telemetry:                       12.7   15.3  )-----------( 241                  41.8       135  |  96  |  14  -----------------------<  104<H>  4.5   |  27  |  0.83    Ca    8.7     Phos  3.4     Mg     2.0        < from: TTE with Doppler (w/Cont) (07.06.19 @ 10:06) >  1. The aortic valve leaflets are mildly thickened.  There  is aortic sclerosis but no stenosis. No aortic valve  regurgitation seen.  2. The left ventricular size is normal  3. There is septal hypokineisis.   The LVEF is about 45%.  4. The right ventricle appears moderately dilated with  mildly reduced function.  5. There is no significantmitral or tricuspid  regurgitation.  6. There is no pericardial effusion.

## 2019-07-13 NOTE — PROGRESS NOTE ADULT - SUBJECTIVE AND OBJECTIVE BOX
24H hour events: pt c/o cough since last night, s/p attempted VT ablation yesterday. Tele: Afib at 60-70's, PVCs     MEDICATIONS:  apixaban 5 milliGRAM(s) Oral every 12 hours  furosemide    Tablet 40 milliGRAM(s) Oral daily  sotalol 120 milliGRAM(s) Oral every 12 hours  tamsulosin 0.8 milliGRAM(s) Oral at bedtime    ALBUTerol    90 MICROgram(s) HFA Inhaler 2 Puff(s) Inhalation every 6 hours PRN  benzonatate 100 milliGRAM(s) Oral three times a day PRN  buDESOnide 160 MICROgram(s)/formoterol 4.5 MICROgram(s) Inhaler 2 Puff(s) Inhalation two times a day  guaiFENesin    Syrup 100 milliGRAM(s) Oral every 6 hours PRN  tiotropium 18 MICROgram(s) Capsule 1 Capsule(s) Inhalation daily    docusate sodium 100 milliGRAM(s) Oral three times a day  pantoprazole    Tablet 40 milliGRAM(s) Oral before breakfast  polyethylene glycol 3350 17 Gram(s) Oral daily  senna 2 Tablet(s) Oral at bedtime    atorvastatin 10 milliGRAM(s) Oral at bedtime  finasteride 5 milliGRAM(s) Oral daily    chlorhexidine 2% Cloths 1 Application(s) Topical daily  cyanocobalamin 1000 MICROGram(s) Oral daily      REVIEW OF SYSTEMS:  Complete 10point ROS negative.    PHYSICAL EXAM:  T(C): 36.6 (07-13-19 @ 07:00), Max: 36.9 (07-12-19 @ 19:45)  HR: 78 (07-13-19 @ 11:00) (60 - 84)  BP: 112/58 (07-13-19 @ 11:00) (91/60 - 136/78)  RR: 21 (07-13-19 @ 11:00) (15 - 26)  SpO2: 93% (07-13-19 @ 11:00) (93% - 100%)  Wt(kg): --  I&O's Summary    12 Jul 2019 07:01  -  13 Jul 2019 07:00  --------------------------------------------------------  IN: 440 mL / OUT: 900 mL / NET: -460 mL    13 Jul 2019 07:01  -  13 Jul 2019 11:57  --------------------------------------------------------  IN: 120 mL / OUT: 500 mL / NET: -380 mL      Appearance: NAD	  Neck: Supple   Cardiovascular: Normal S1 S2, Irregular  Respiratory: Lungs clear to auscultation	  Psychiatry: A & O x 3, Mood & affect appropriate  Gastrointestinal:  Soft, Non-tender, + BS	  Skin: No rashes, No ecchymoses, No cyanosis	  Extremities: Normal range of motion, No clubbing, cyanosis or edema. Right groin surgical incision site C/D/I without hematoma   Vascular: Peripheral pulses palpable 2+ bilaterally      LABS:	 	    CBC Full  -  ( 13 Jul 2019 06:08 )  WBC Count : 15.3 K/uL  Hemoglobin : 12.7 g/dL  Hematocrit : 41.8 %  Platelet Count - Automated : 241 K/uL  Mean Cell Volume : 81.8 fl  Mean Cell Hemoglobin : 24.8 pg  Mean Cell Hemoglobin Concentration : 30.3 gm/dL  Auto Neutrophil # : 12.3 K/uL  Auto Lymphocyte # : 1.4 K/uL  Auto Monocyte # : 1.6 K/uL  Auto Eosinophil # : 0.1 K/uL  Auto Basophil # : 0.0 K/uL  Auto Neutrophil % : 79.9 %  Auto Lymphocyte % : 8.8 %  Auto Monocyte % : 10.3 %  Auto Eosinophil % : 0.8 %  Auto Basophil % : 0.1 %    07-13    135  |  96  |  14  ----------------------------<  104<H>  4.5   |  27  |  0.83  07-12    132<L>  |  94<L>  |  16  ----------------------------<  115<H>  4.0   |  28  |  1.10    Ca    8.7      13 Jul 2019 06:08  Ca    9.2      12 Jul 2019 04:43  Phos  3.4     07-13  Phos  3.1     07-12  Mg     2.0     07-13  Mg     2.2     07-12      TELEMETRY: Afib at 60-70's, PVCs    	    ECG: Afib, QTc stable 24H hour events: pt c/o cough since last night, s/p attempted VT ablation yesterday. Tele: Afib at 60-70's, PVCs     MEDICATIONS:  apixaban 5 milliGRAM(s) Oral every 12 hours  furosemide    Tablet 40 milliGRAM(s) Oral daily  sotalol 120 milliGRAM(s) Oral every 12 hours  tamsulosin 0.8 milliGRAM(s) Oral at bedtime    ALBUTerol    90 MICROgram(s) HFA Inhaler 2 Puff(s) Inhalation every 6 hours PRN  benzonatate 100 milliGRAM(s) Oral three times a day PRN  buDESOnide 160 MICROgram(s)/formoterol 4.5 MICROgram(s) Inhaler 2 Puff(s) Inhalation two times a day  guaiFENesin    Syrup 100 milliGRAM(s) Oral every 6 hours PRN  tiotropium 18 MICROgram(s) Capsule 1 Capsule(s) Inhalation daily    docusate sodium 100 milliGRAM(s) Oral three times a day  pantoprazole    Tablet 40 milliGRAM(s) Oral before breakfast  polyethylene glycol 3350 17 Gram(s) Oral daily  senna 2 Tablet(s) Oral at bedtime    atorvastatin 10 milliGRAM(s) Oral at bedtime  finasteride 5 milliGRAM(s) Oral daily    chlorhexidine 2% Cloths 1 Application(s) Topical daily  cyanocobalamin 1000 MICROGram(s) Oral daily      REVIEW OF SYSTEMS:  Complete 10point ROS negative.    PHYSICAL EXAM:  T(C): 36.6 (07-13-19 @ 07:00), Max: 36.9 (07-12-19 @ 19:45)  HR: 78 (07-13-19 @ 11:00) (60 - 84)  BP: 112/58 (07-13-19 @ 11:00) (91/60 - 136/78)  RR: 21 (07-13-19 @ 11:00) (15 - 26)  SpO2: 93% (07-13-19 @ 11:00) (93% - 100%)  Wt(kg): --  I&O's Summary    12 Jul 2019 07:01  -  13 Jul 2019 07:00  --------------------------------------------------------  IN: 440 mL / OUT: 900 mL / NET: -460 mL    13 Jul 2019 07:01  -  13 Jul 2019 11:57  --------------------------------------------------------  IN: 120 mL / OUT: 500 mL / NET: -380 mL      Appearance: NAD	  Neck: Supple   Cardiovascular: Normal S1 S2, Irregular  Respiratory: Lungs clear to auscultation	  Psychiatry: A & O x 3, Mood & affect appropriate  Gastrointestinal:  Soft, Non-tender, + BS	  Skin: No rashes, No ecchymoses, No cyanosis	  Extremities: Normal range of motion, No clubbing, cyanosis or edema. Right groin surgical incision site C/D/I without hematoma   Vascular: Peripheral pulses palpable 2+ bilaterally      LABS:	 	    CBC Full  -  ( 13 Jul 2019 06:08 )  WBC Count : 15.3 K/uL  Hemoglobin : 12.7 g/dL  Hematocrit : 41.8 %  Platelet Count - Automated : 241 K/uL  Mean Cell Volume : 81.8 fl  Mean Cell Hemoglobin : 24.8 pg  Mean Cell Hemoglobin Concentration : 30.3 gm/dL  Auto Neutrophil # : 12.3 K/uL  Auto Lymphocyte # : 1.4 K/uL  Auto Monocyte # : 1.6 K/uL  Auto Eosinophil # : 0.1 K/uL  Auto Basophil # : 0.0 K/uL  Auto Neutrophil % : 79.9 %  Auto Lymphocyte % : 8.8 %  Auto Monocyte % : 10.3 %  Auto Eosinophil % : 0.8 %  Auto Basophil % : 0.1 %    07-13    135  |  96  |  14  ----------------------------<  104<H>  4.5   |  27  |  0.83  07-12    132<L>  |  94<L>  |  16  ----------------------------<  115<H>  4.0   |  28  |  1.10    Ca    8.7      13 Jul 2019 06:08  Ca    9.2      12 Jul 2019 04:43  Phos  3.4     07-13  Phos  3.1     07-12  Mg     2.0     07-13  Mg     2.2     07-12      TELEMETRY: Afib at 60-70's, PVCs    	    ECG: Afib, QTc stable    < from: Xray Chest 1 View- PORTABLE-Urgent (07.12.19 @ 23:33) >  IMPRESSION:   Stable thoracic aortic atheromatous changes and ectasia..  Stable cardiomegaly.  Stable lower right lung atelectatic changes.  No bilateral focal consolidations.  No significant pleural effusion. No pneumothorax  No acute bony findings.

## 2019-07-13 NOTE — PROGRESS NOTE ADULT - SUBJECTIVE AND OBJECTIVE BOX
====================  CCU MIDNIGHT ROUNDS  ====================    ALICE JUNIOR  [id number]  Patient is a 69y old  Male who presents with a chief complaint of syncopal episode (13 Jul 2019 23:44)      ====================  SUMMARY: 69M with new AFib s/p LHC with normal coronaries; p/w new onset a/fib  hospital course c/b syncope in setting of VT s/p unsuccessful VT ablatio, now on sotalol 120mg BID  ====================        ====================  NEW EVENTS: Continued to have episodes VT; received Lopressor 5mg IV x2 in addition to Sotalol 120mg BID. VT resolved; patient asymptomatic, no chest pain or sob.  ====================        ====================  VITALS (Last 12 hrs):  ====================    T(C): 36.8 (07-13-19 @ 22:00), Max: 37.4 (07-13-19 @ 18:21)  T(F): 98.3 (07-13-19 @ 22:00), Max: 99.3 (07-13-19 @ 18:21)  HR: 75 (07-14-19 @ 00:10) (66 - 78)  BP: 104/67 (07-14-19 @ 00:00) (100/66 - 132/67)  BP(mean): 82 (07-14-19 @ 00:00) (70 - 94)  ABP: --  ABP(mean): --  RR: 17 (07-14-19 @ 00:00) (16 - 27)  SpO2: 96% (07-14-19 @ 00:10) (93% - 96%)  Wt(kg): --  CVP(mm Hg): --  CVP(cm H2O): --  CO: --  CI: --  PA: --  PA(mean): --  PCWP: --  SVR: --  PVR: --    I&O's Summary    12 Jul 2019 07:01  -  13 Jul 2019 07:00  --------------------------------------------------------  IN: 440 mL / OUT: 900 mL / NET: -460 mL    13 Jul 2019 07:01  -  14 Jul 2019 00:48  --------------------------------------------------------  IN: 640 mL / OUT: 1550 mL / NET: -910 mL            ====================  NEW LABS:  ====================                          12.7   15.3  )-----------( 241      ( 13 Jul 2019 06:08 )             41.8     07-13    135  |  96  |  14  ----------------------------<  104<H>  4.5   |  27  |  0.83    Ca    8.7      13 Jul 2019 06:08  Phos  3.4     07-13  Mg     2.0     07-13      PT/INR - ( 13 Jul 2019 06:08 )   PT: 13.6 sec;   INR: 1.18 ratio         PTT - ( 13 Jul 2019 06:08 )  PTT:75.2 sec            ====================  PLAN:  ====================  - C/w Sotalol 120mg BID; s/p third dose this evening  - Repeat EKG QTc <500 (403); received third dose tonight  - Next dose due 10AM - will recheck EKG Q2h after every dose  - c/w Eliquis for A fib  -Plan fo ICD - possibly Monday?   .    [facility] R2  CCU Resident

## 2019-07-13 NOTE — PROGRESS NOTE ADULT - SUBJECTIVE AND OBJECTIVE BOX
====================  CCU MIDNIGHT ROUNDS  ====================    ALICE JUNIOR  [id number]  Patient is a 69y old  Male who presents with a chief complaint of syncopal episode (12 Jul 2019 08:52)      ====================  SUMMARY:  ====================        ====================  NEW EVENTS:  ====================        ====================  VITALS (Last 12 hrs):  ====================    T(C): 36.7 (07-13-19 @ 00:00), Max: 36.9 (07-12-19 @ 19:45)  T(F): 98.1 (07-13-19 @ 00:00), Max: 98.5 (07-12-19 @ 19:45)  HR: 68 (07-13-19 @ 01:00) (66 - 84)  BP: 94/60 (07-13-19 @ 01:00) (91/60 - 129/70)  BP(mean): 79 (07-13-19 @ 01:00) (63 - 107)  ABP: --  ABP(mean): --  RR: 22 (07-13-19 @ 01:00) (15 - 26)  SpO2: 96% (07-13-19 @ 01:00) (93% - 100%)  Wt(kg): --  CVP(mm Hg): --  CVP(cm H2O): --  CO: --  CI: --  PA: --  PA(mean): --  PCWP: --  SVR: --  PVR: --    I&O's Summary    11 Jul 2019 07:01  -  12 Jul 2019 07:00  --------------------------------------------------------  IN: 1481.3 mL / OUT: 1750 mL / NET: -268.7 mL    12 Jul 2019 07:01  -  13 Jul 2019 01:33  --------------------------------------------------------  IN: 120 mL / OUT: 300 mL / NET: -180 mL            ====================  NEW LABS:  ====================                          13.4   10.1  )-----------( 250      ( 12 Jul 2019 04:43 )             41.9     07-12    132<L>  |  94<L>  |  16  ----------------------------<  115<H>  4.0   |  28  |  1.10    Ca    9.2      12 Jul 2019 04:43  Phos  3.1     07-12  Mg     2.2     07-12      PT/INR - ( 12 Jul 2019 04:43 )   PT: 13.6 sec;   INR: 1.19 ratio         PTT - ( 12 Jul 2019 04:43 )  PTT:74.9 sec            ====================  PLAN:  ====================  -

## 2019-07-13 NOTE — PROGRESS NOTE ADULT - ASSESSMENT
69 year old male admitted for a syncopal episode at home, loosing consciousness. Pt transferred to [facility], S/P Cardiac cath on 7/8- normal coronary arteries now S/P RRT for sustained VT, - 220's lasting approximately 3 minutes, which self- terminated 69 year old male admitted for a syncopal episode at home, loosing consciousness. Pt transferred to [facility], S/P Cardiac cath on 7/8- normal coronary arteries now S/P RRT for sustained VT, - 220's lasting approximately 3 minutes, which self- terminated s/p unsuccessful VT ablation

## 2019-07-13 NOTE — PROGRESS NOTE ADULT - ASSESSMENT
69 year old male with past medical history of HTN, HLD, COPD, MORRO on CPAP/3L oxygen at home, recently diagnosed with AFIB on Eliquis/ Cardizem and Gout initially presented to [place] after having syncopal episode at home, transferred to [facility] for further workup. S/P Cardiac cath on 7/8- normal coronary arteries. S/P RRT 7/10/19 for symptomatic sustained VT, - 220's lasting approximately 3 minutes, which self- terminated. Now s/p attempted VT ablation 7/12, started on sotalol 7/12 evening.     # AFIB  # Ventricular Tachycardia  - ECHO: EF 45%, LA volume index 39. Unable to obtain cMRI due to body habitus.    - Tele: AFIB HR 60-70's with PVCs  - Restarted on eliquis this am  - Continue with sotalol 120mg BID, daily ECG to monitor QTc   - Plan for ICD implant next week     [phone]

## 2019-07-14 LAB
ANION GAP SERPL CALC-SCNC: 11 MMOL/L — SIGNIFICANT CHANGE UP (ref 5–17)
APTT BLD: 34 SEC — SIGNIFICANT CHANGE UP (ref 27.5–36.3)
BASOPHILS # BLD AUTO: 0 K/UL — SIGNIFICANT CHANGE UP (ref 0–0.2)
BASOPHILS NFR BLD AUTO: 0.2 % — SIGNIFICANT CHANGE UP (ref 0–2)
BLD GP AB SCN SERPL QL: NEGATIVE — SIGNIFICANT CHANGE UP
BUN SERPL-MCNC: 15 MG/DL — SIGNIFICANT CHANGE UP (ref 7–23)
CALCIUM SERPL-MCNC: 8.8 MG/DL — SIGNIFICANT CHANGE UP (ref 8.4–10.5)
CHLORIDE SERPL-SCNC: 94 MMOL/L — LOW (ref 96–108)
CO2 SERPL-SCNC: 28 MMOL/L — SIGNIFICANT CHANGE UP (ref 22–31)
CREAT SERPL-MCNC: 0.91 MG/DL — SIGNIFICANT CHANGE UP (ref 0.5–1.3)
EOSINOPHIL # BLD AUTO: 0.1 K/UL — SIGNIFICANT CHANGE UP (ref 0–0.5)
EOSINOPHIL NFR BLD AUTO: 0.6 % — SIGNIFICANT CHANGE UP (ref 0–6)
GLUCOSE SERPL-MCNC: 94 MG/DL — SIGNIFICANT CHANGE UP (ref 70–99)
HCT VFR BLD CALC: 42.9 % — SIGNIFICANT CHANGE UP (ref 39–50)
HGB BLD-MCNC: 13.6 G/DL — SIGNIFICANT CHANGE UP (ref 13–17)
INR BLD: 1.52 RATIO — HIGH (ref 0.88–1.16)
LYMPHOCYTES # BLD AUTO: 1.5 K/UL — SIGNIFICANT CHANGE UP (ref 1–3.3)
LYMPHOCYTES # BLD AUTO: 10 % — LOW (ref 13–44)
MAGNESIUM SERPL-MCNC: 1.9 MG/DL — SIGNIFICANT CHANGE UP (ref 1.6–2.6)
MCHC RBC-ENTMCNC: 25.8 PG — LOW (ref 27–34)
MCHC RBC-ENTMCNC: 31.7 GM/DL — LOW (ref 32–36)
MCV RBC AUTO: 81.3 FL — SIGNIFICANT CHANGE UP (ref 80–100)
MONOCYTES # BLD AUTO: 1.4 K/UL — HIGH (ref 0–0.9)
MONOCYTES NFR BLD AUTO: 9.6 % — SIGNIFICANT CHANGE UP (ref 2–14)
NEUTROPHILS # BLD AUTO: 11.7 K/UL — HIGH (ref 1.8–7.4)
NEUTROPHILS NFR BLD AUTO: 79.6 % — HIGH (ref 43–77)
PHOSPHATE SERPL-MCNC: 3.2 MG/DL — SIGNIFICANT CHANGE UP (ref 2.5–4.5)
PLATELET # BLD AUTO: 240 K/UL — SIGNIFICANT CHANGE UP (ref 150–400)
POTASSIUM SERPL-MCNC: 4 MMOL/L — SIGNIFICANT CHANGE UP (ref 3.5–5.3)
POTASSIUM SERPL-SCNC: 4 MMOL/L — SIGNIFICANT CHANGE UP (ref 3.5–5.3)
PROTHROM AB SERPL-ACNC: 17.6 SEC — HIGH (ref 10–12.9)
RBC # BLD: 5.28 M/UL — SIGNIFICANT CHANGE UP (ref 4.2–5.8)
RBC # FLD: 14 % — SIGNIFICANT CHANGE UP (ref 10.3–14.5)
RH IG SCN BLD-IMP: POSITIVE — SIGNIFICANT CHANGE UP
SODIUM SERPL-SCNC: 133 MMOL/L — LOW (ref 135–145)
WBC # BLD: 14.7 K/UL — HIGH (ref 3.8–10.5)
WBC # FLD AUTO: 14.7 K/UL — HIGH (ref 3.8–10.5)

## 2019-07-14 PROCEDURE — 93010 ELECTROCARDIOGRAM REPORT: CPT | Mod: 77

## 2019-07-14 PROCEDURE — 93010 ELECTROCARDIOGRAM REPORT: CPT

## 2019-07-14 PROCEDURE — 99233 SBSQ HOSP IP/OBS HIGH 50: CPT | Mod: GC

## 2019-07-14 RX ORDER — SOTALOL HCL 120 MG
160 TABLET ORAL EVERY 12 HOURS
Refills: 0 | Status: DISCONTINUED | OUTPATIENT
Start: 2019-07-14 | End: 2019-07-17

## 2019-07-14 RX ORDER — METOPROLOL TARTRATE 50 MG
5 TABLET ORAL ONCE
Refills: 0 | Status: COMPLETED | OUTPATIENT
Start: 2019-07-14 | End: 2019-07-14

## 2019-07-14 RX ORDER — MAGNESIUM SULFATE 500 MG/ML
1 VIAL (ML) INJECTION ONCE
Refills: 0 | Status: COMPLETED | OUTPATIENT
Start: 2019-07-14 | End: 2019-07-14

## 2019-07-14 RX ADMIN — FINASTERIDE 5 MILLIGRAM(S): 5 TABLET, FILM COATED ORAL at 12:06

## 2019-07-14 RX ADMIN — POLYETHYLENE GLYCOL 3350 17 GRAM(S): 17 POWDER, FOR SOLUTION ORAL at 12:06

## 2019-07-14 RX ADMIN — Medication 100 MILLIGRAM(S): at 05:39

## 2019-07-14 RX ADMIN — Medication 100 MILLIGRAM(S): at 12:07

## 2019-07-14 RX ADMIN — Medication 40 MILLIGRAM(S): at 05:39

## 2019-07-14 RX ADMIN — BUDESONIDE AND FORMOTEROL FUMARATE DIHYDRATE 2 PUFF(S): 160; 4.5 AEROSOL RESPIRATORY (INHALATION) at 06:04

## 2019-07-14 RX ADMIN — Medication 120 MILLIGRAM(S): at 09:37

## 2019-07-14 RX ADMIN — TAMSULOSIN HYDROCHLORIDE 0.8 MILLIGRAM(S): 0.4 CAPSULE ORAL at 22:04

## 2019-07-14 RX ADMIN — TIOTROPIUM BROMIDE 1 CAPSULE(S): 18 CAPSULE ORAL; RESPIRATORY (INHALATION) at 12:08

## 2019-07-14 RX ADMIN — BUDESONIDE AND FORMOTEROL FUMARATE DIHYDRATE 2 PUFF(S): 160; 4.5 AEROSOL RESPIRATORY (INHALATION) at 18:25

## 2019-07-14 RX ADMIN — SENNA PLUS 2 TABLET(S): 8.6 TABLET ORAL at 22:04

## 2019-07-14 RX ADMIN — APIXABAN 5 MILLIGRAM(S): 2.5 TABLET, FILM COATED ORAL at 08:36

## 2019-07-14 RX ADMIN — PANTOPRAZOLE SODIUM 40 MILLIGRAM(S): 20 TABLET, DELAYED RELEASE ORAL at 05:40

## 2019-07-14 RX ADMIN — Medication 160 MILLIGRAM(S): at 17:05

## 2019-07-14 RX ADMIN — ATORVASTATIN CALCIUM 10 MILLIGRAM(S): 80 TABLET, FILM COATED ORAL at 22:04

## 2019-07-14 RX ADMIN — Medication 100 GRAM(S): at 06:03

## 2019-07-14 RX ADMIN — Medication 100 MILLIGRAM(S): at 22:04

## 2019-07-14 RX ADMIN — Medication 5 MILLIGRAM(S): at 06:50

## 2019-07-14 RX ADMIN — PREGABALIN 1000 MICROGRAM(S): 225 CAPSULE ORAL at 12:06

## 2019-07-14 NOTE — PROGRESS NOTE ADULT - PROBLEM SELECTOR PROBLEM 2
Atrial fibrillation, unspecified type
Atrial fibrillation, unspecified type
COPD (chronic obstructive pulmonary disease)
Atrial fibrillation, unspecified type
Syncope, unspecified syncope type
Atrial fibrillation, unspecified type

## 2019-07-14 NOTE — PROGRESS NOTE ADULT - PROBLEM SELECTOR PROBLEM 1
Syncope, unspecified syncope type
Syncope, unspecified syncope type
Syncope
Syncope, unspecified syncope type
Syncope, unspecified syncope type
Ventricular tachycardia
Chronic obstructive pulmonary disease, unspecified COPD type

## 2019-07-14 NOTE — PROGRESS NOTE ADULT - PROBLEM SELECTOR PLAN 3
No signs of bronchospasm on exam, not in exacerbation.   - c/w symbicort and spiriva
No signs of bronchospasm on exam, not in exacerbation.   - c/w symbicort and spiriva
Appears euvolemic   -Cont. Lasix 40 po daily  - Monitor i/o, BUN/Cr and daily weight
No signs of bronchospasm on exam, not in exacerbation.   - c/w symbicort and spiriva
check BNP, CXR, continue lasix, watch BUN/creatinine, consider pulmonary followup
oxygen supplement as needed  HX of MORRO (CPAP @ [facility])  albuterol prn  continue Symbicort and Spiriva
2L NC  albuterol prn  continue Symbicort and Spiriva
- Continue sotalol, f/u EP
No signs of bronchospasm on exam, not in exacerbation.   - c/w symbicort and spiriva

## 2019-07-14 NOTE — CHART NOTE - NSCHARTNOTEFT_GEN_A_CORE
====================  CCU MIDNIGHT ROUNDS  ====================    ALICE JUNIOR  [id number]    ====================  SUMMARY: HPI:  70 y/o M w/ pmhx of HTN, HLD, MORRO (on CPAP, Home O2 2L), recently diagnosed afib (on cardizem and Eliquis) and Gout presented to [place] after having one syncopal episode at home on 6/29.  At [place], Trops negative and pt had ? vtach vs SVT episode (No EKG available) and now transferred to [facility] for possible EPS. Denies CP, SOB, HA, dizziness or fatigue (05 Jul 2019 12:41)    ====================        ====================  NEW EVENTS:  QTC this evening 487 2 hours post sotalol 160mg.   NPO after midnight for possible ICD tomorrow.   ====================        ====================  VITALS (Last 12 hrs):  ====================    T(C): 36.7 (07-14-19 @ 19:00), Max: 37.1 (07-14-19 @ 14:00)  HR: 70 (07-14-19 @ 20:00) (70 - 86)  BP: 115/72 (07-14-19 @ 20:00) (98/55 - 122/70)  BP(mean): 78 (07-14-19 @ 20:00) (65 - 91)  RR: 36 (07-14-19 @ 20:00) (16 - 36)  SpO2: 92% (07-14-19 @ 20:00) (92% - 95%)      TELEMETRY: afib rate controlled          I&O's Summary    13 Jul 2019 07:01  -  14 Jul 2019 07:00  --------------------------------------------------------  IN: 640 mL / OUT: 1925 mL / NET: -1285 mL    14 Jul 2019 07:01  -  14 Jul 2019 20:47  --------------------------------------------------------  IN: 600 mL / OUT: 775 mL / NET: -175 mL        ====================  PLAN:  # VT  - s/p failed VT ablation 7/12  - started on sotalol per EP  - sotalol increased today to 160mg  - check QTC 2 hours post each dose; this evenings QTC was 487  - NPO after midnight for possible ICD tomorrow  - eliquis on hold  - active T&S  - quiet on tele  ====================    HEALTH ISSUES - PROBLEM Dx:  Benign prostatic hyperplasia with post-void dribbling: Benign prostatic hyperplasia with post-void dribbling  Chronic obstructive pulmonary disease, unspecified COPD type: Chronic obstructive pulmonary disease, unspecified COPD type  MORRO on CPAP: MORRO on CPAP  HLD (hyperlipidemia): HLD (hyperlipidemia)  Class 2 obesity due to excess calories with body mass index (BMI) of 39.0 to 39.9 in adult, unspecified whether serious comorbidity present: Class 2 obesity due to excess calories with body mass index (BMI) of 39.0 to 39.9 in adult, unspecified whether serious comorbidity present  Essential hypertension: Essential hypertension  BPH (benign prostatic hyperplasia): BPH (benign prostatic hyperplasia)  Systolic heart failure, unspecified HF chronicity: Systolic heart failure, unspecified HF chronicity  Atrial fibrillation, unspecified type: Atrial fibrillation, unspecified type  Shortness of breath: Shortness of breath  Syncope, unspecified syncope type: Syncope, unspecified syncope type  Ventricular tachycardia: Ventricular tachycardia  HTN (hypertension): HTN (hypertension)  COPD (chronic obstructive pulmonary disease): COPD (chronic obstructive pulmonary disease)  Syncope: Syncope        GiselleYU Parada #[id number]

## 2019-07-14 NOTE — PROGRESS NOTE ADULT - ASSESSMENT
69 year old M w/ COPD on home O2, HTN, newly diagnosed AF who presented w./ VT s/p failed VT ablation now on sotalol

## 2019-07-14 NOTE — PROGRESS NOTE ADULT - SUBJECTIVE AND OBJECTIVE BOX
Events:    Review Of Systems:  Constitutional: denies fever, chills, Fatigue   HEENT: denies Blurred vision, Eye Pain, Headache   Respiratory: denies Cough, Wheezing , Shortness of breath  Cardiovascular: denies Chest Pain, Palpitations,  CALDERON   Gastrointestinal: denies Abdominal Pain, Diarrhea, Constipation   Genitourinary: denies Nocturia, Dysuria, Incontinence  Extremities: denies Swelling, Joint Pain  Neurologic: denies Focal deficit, Paresthesias, Syncope  Lymphatic: denies Swelling, Lymphadenopathy   Skin: denies Rash, Ecchymoses, Wounds   Psychiatry: denies Depression, Suicidal/Homicidal Ideation, anxiety  [X ] 10 point review of systems is otherwise negative except as mentioned above         Medications:  ALBUTerol    90 MICROgram(s) HFA Inhaler 2 Puff(s) Inhalation every 6 hours PRN  apixaban 5 milliGRAM(s) Oral every 12 hours  atorvastatin 10 milliGRAM(s) Oral at bedtime  benzonatate 100 milliGRAM(s) Oral three times a day PRN  buDESOnide 160 MICROgram(s)/formoterol 4.5 MICROgram(s) Inhaler 2 Puff(s) Inhalation two times a day  chlorhexidine 2% Cloths 1 Application(s) Topical daily  cyanocobalamin 1000 MICROGram(s) Oral daily  docusate sodium 100 milliGRAM(s) Oral three times a day  finasteride 5 milliGRAM(s) Oral daily  furosemide    Tablet 40 milliGRAM(s) Oral daily  guaiFENesin    Syrup 100 milliGRAM(s) Oral every 6 hours PRN  pantoprazole    Tablet 40 milliGRAM(s) Oral before breakfast  polyethylene glycol 3350 17 Gram(s) Oral daily  senna 2 Tablet(s) Oral at bedtime  sotalol 120 milliGRAM(s) Oral every 12 hours  tamsulosin 0.8 milliGRAM(s) Oral at bedtime  tiotropium 18 MICROgram(s) Capsule 1 Capsule(s) Inhalation daily    PMH/PSH/FH/SH: [ ] Unchanged  Vitals:  T(C): 36.7 (07-14-19 @ 05:00), Max: 37.4 (07-13-19 @ 18:21)  HR: 78 (07-14-19 @ 07:00) (66 - 82)  BP: 136/72 (07-14-19 @ 07:00) (100/66 - 136/72)  BP(mean): 98 (07-14-19 @ 07:00) (70 - 98)  RR: 21 (07-14-19 @ 07:00) (15 - 27)  SpO2: 97% (07-14-19 @ 07:00) (82% - 97%)  Wt(kg): --  Daily     Daily   I&O's Summary    13 Jul 2019 07:01  -  14 Jul 2019 07:00  --------------------------------------------------------  IN: 640 mL / OUT: 1700 mL / NET: -1060 mL        Physical Exam:  Appearance: [ ] Normal [ ] NAD  Eyes: [ ] PERRL [ ] EOMI  HENT: [ ] Normal oral muscosa [ ]NC/AT  Cardiovascular: [ ] S1 [ ] S2 [ ] RRR [ ] No m/r/g [ ]No edema [ ] JVP  Procedural Access Site: [ ] No hematoma [ ] Non-tender to palpation [ ] 2+ pulse [ ] No bruit [ ] No Ecchymosis  Respiratory: [ ] Clear to auscultation bilaterally  Gastrointestinal: [ ] Soft [ ] Non-tender [ ] Non-distended [ ] BS+  Musculoskeletal: [ ] No clubbing [ ] No joint deformity   Neurologic: [ ] Non-focal  Lymphatic: [ ] No lymphadenopathy  Psychiatry: [ ] AAOx3 [ ] Mood & affect appropriate  Skin: [ ] No rashes [ ] No ecchymoses [ ] No cyanosis    07-14    133<L>  |  94<L>  |  15  ----------------------------<  94  4.0   |  28  |  0.91    Ca    8.8      14 Jul 2019 05:08  Phos  3.2     07-14  Mg     1.9     07-14      PT/INR - ( 14 Jul 2019 05:08 )   PT: 17.6 sec;   INR: 1.52 ratio         PTT - ( 14 Jul 2019 05:08 )  PTT:34.0 sec              ECG:    Echo:    Stress Testing:     Cath:    Imaging:    Interpretation of Telemetry: HPI:  70 y/o M w/ pmhx of HTN, HLD, MORRO (on CPAP, Home O2 2L), recently diagnosed afib (on cardizem and Eliquis) and Gout presented to [place] after having one syncopal episode at home on 6/29.  At [place], Trops negative and pt had ? vtach vs SVT episode (No EKG available) and now transferred to [facility] for possible EPS. (05 Jul 2019 12:41) Pt underwent cardiac cath and found to have clean cors. Pt underwent VT ablation but it was unsuccessful secondary to scar tissue. Pt now on sotalol 120 mg.     Events:    Review Of Systems:  Constitutional: denies fever, chills, Fatigue   HEENT: denies Blurred vision, Eye Pain, Headache   Respiratory: denies Cough, Wheezing , Shortness of breath  Cardiovascular: denies Chest Pain, Palpitations,  CALDERON   Gastrointestinal: denies Abdominal Pain, Diarrhea, Constipation   Genitourinary: denies Nocturia, Dysuria, Incontinence  Extremities: denies Swelling, Joint Pain  Neurologic: denies Focal deficit, Paresthesias, Syncope  Lymphatic: denies Swelling, Lymphadenopathy   Skin: denies Rash, Ecchymoses, Wounds   Psychiatry: denies Depression, Suicidal/Homicidal Ideation, anxiety  [X ] 10 point review of systems is otherwise negative except as mentioned above         Medications:  ALBUTerol    90 MICROgram(s) HFA Inhaler 2 Puff(s) Inhalation every 6 hours PRN  apixaban 5 milliGRAM(s) Oral every 12 hours  atorvastatin 10 milliGRAM(s) Oral at bedtime  benzonatate 100 milliGRAM(s) Oral three times a day PRN  buDESOnide 160 MICROgram(s)/formoterol 4.5 MICROgram(s) Inhaler 2 Puff(s) Inhalation two times a day  chlorhexidine 2% Cloths 1 Application(s) Topical daily  cyanocobalamin 1000 MICROGram(s) Oral daily  docusate sodium 100 milliGRAM(s) Oral three times a day  finasteride 5 milliGRAM(s) Oral daily  furosemide    Tablet 40 milliGRAM(s) Oral daily  guaiFENesin    Syrup 100 milliGRAM(s) Oral every 6 hours PRN  pantoprazole    Tablet 40 milliGRAM(s) Oral before breakfast  polyethylene glycol 3350 17 Gram(s) Oral daily  senna 2 Tablet(s) Oral at bedtime  sotalol 120 milliGRAM(s) Oral every 12 hours  tamsulosin 0.8 milliGRAM(s) Oral at bedtime  tiotropium 18 MICROgram(s) Capsule 1 Capsule(s) Inhalation daily    PMH/PSH/FH/SH: [ ] Unchanged  Vitals:  T(C): 36.7 (07-14-19 @ 05:00), Max: 37.4 (07-13-19 @ 18:21)  HR: 78 (07-14-19 @ 07:00) (66 - 82)  BP: 136/72 (07-14-19 @ 07:00) (100/66 - 136/72)  BP(mean): 98 (07-14-19 @ 07:00) (70 - 98)  RR: 21 (07-14-19 @ 07:00) (15 - 27)  SpO2: 97% (07-14-19 @ 07:00) (82% - 97%)  Wt(kg): --  Daily     Daily   I&O's Summary    13 Jul 2019 07:01  -  14 Jul 2019 07:00  --------------------------------------------------------  IN: 640 mL / OUT: 1700 mL / NET: -1060 mL        Physical Exam:  Appearance: [ ] Normal [ ] NAD  Eyes: [ ] PERRL [ ] EOMI  HENT: [ ] Normal oral muscosa [ ]NC/AT  Cardiovascular: [ ] S1 [ ] S2 [ ] RRR [ ] No m/r/g [ ]No edema [ ] JVP  Procedural Access Site: [ ] No hematoma [ ] Non-tender to palpation [ ] 2+ pulse [ ] No bruit [ ] No Ecchymosis  Respiratory: [ ] Clear to auscultation bilaterally  Gastrointestinal: [ ] Soft [ ] Non-tender [ ] Non-distended [ ] BS+  Musculoskeletal: [ ] No clubbing [ ] No joint deformity   Neurologic: [ ] Non-focal  Lymphatic: [ ] No lymphadenopathy  Psychiatry: [ ] AAOx3 [ ] Mood & affect appropriate  Skin: [ ] No rashes [ ] No ecchymoses [ ] No cyanosis    07-14    133<L>  |  94<L>  |  15  ----------------------------<  94  4.0   |  28  |  0.91    Ca    8.8      14 Jul 2019 05:08  Phos  3.2     07-14  Mg     1.9     07-14      PT/INR - ( 14 Jul 2019 05:08 )   PT: 17.6 sec;   INR: 1.52 ratio         PTT - ( 14 Jul 2019 05:08 )  PTT:34.0 sec              ECG:    Echo:    Stress Testing:     Cath:    Imaging:    Interpretation of Telemetry: HPI:  68 y/o M w/ pmhx of HTN, HLD, MORRO (on CPAP, Home O2 2L), recently diagnosed afib (on cardizem and Eliquis) and Gout presented to [place] after having one syncopal episode at home on 6/29.  At [place], Trops negative and pt had ? vtach vs SVT episode (No EKG available) and now transferred to [facility] for possible EPS. (05 Jul 2019 12:41) Pt underwent cardiac cath and found to have clean cors. Pt underwent VT ablation but it was unsuccessful secondary to scar tissue. Pt now on sotalol 120 mg.     Events: Pt had fourth dose of sotalol Qtc 444 and stable. Pt OOB and asymptomatic.     Review Of Systems:  Constitutional: denies fever, chills, Fatigue   HEENT: denies Blurred vision, Eye Pain, Headache   Respiratory: denies Cough, Wheezing , Shortness of breath  Cardiovascular: denies Chest Pain, Palpitations,  CALDERON   Gastrointestinal: denies Abdominal Pain, Diarrhea, Constipation   Genitourinary: denies Nocturia, Dysuria, Incontinence  Extremities: denies Swelling, Joint Pain  Neurologic: denies Focal deficit, Paresthesias, Syncope  Lymphatic: denies Swelling, Lymphadenopathy   Skin: denies Rash, Ecchymoses, Wounds   Psychiatry: denies Depression, Suicidal/Homicidal Ideation, anxiety  [X ] 10 point review of systems is otherwise negative except as mentioned above         Medications:  ALBUTerol    90 MICROgram(s) HFA Inhaler 2 Puff(s) Inhalation every 6 hours PRN  apixaban 5 milliGRAM(s) Oral every 12 hours  atorvastatin 10 milliGRAM(s) Oral at bedtime  benzonatate 100 milliGRAM(s) Oral three times a day PRN  buDESOnide 160 MICROgram(s)/formoterol 4.5 MICROgram(s) Inhaler 2 Puff(s) Inhalation two times a day  chlorhexidine 2% Cloths 1 Application(s) Topical daily  cyanocobalamin 1000 MICROGram(s) Oral daily  docusate sodium 100 milliGRAM(s) Oral three times a day  finasteride 5 milliGRAM(s) Oral daily  furosemide    Tablet 40 milliGRAM(s) Oral daily  guaiFENesin    Syrup 100 milliGRAM(s) Oral every 6 hours PRN  pantoprazole    Tablet 40 milliGRAM(s) Oral before breakfast  polyethylene glycol 3350 17 Gram(s) Oral daily  senna 2 Tablet(s) Oral at bedtime  sotalol 120 milliGRAM(s) Oral every 12 hours  tamsulosin 0.8 milliGRAM(s) Oral at bedtime  tiotropium 18 MICROgram(s) Capsule 1 Capsule(s) Inhalation daily    Vitals:  T(C): 36.7 (07-14-19 @ 05:00), Max: 37.4 (07-13-19 @ 18:21)  HR: 78 (07-14-19 @ 07:00) (66 - 82)  BP: 136/72 (07-14-19 @ 07:00) (100/66 - 136/72)  BP(mean): 98 (07-14-19 @ 07:00) (70 - 98)  RR: 21 (07-14-19 @ 07:00) (15 - 27)  SpO2: 97% (07-14-19 @ 07:00) (82% - 97%)    Daily     Daily   I&O's Summary    13 Jul 2019 07:01  -  14 Jul 2019 07:00  --------------------------------------------------------  IN: 640 mL / OUT: 1700 mL / NET: -1060 mL    Physical Exam:  Appearance: [X ] Normal [x ] NAD  Eyes: [x ] PERRL [ x] EOMI  HENT: [ x] Normal oral muscosa [ x]NC/AT  Cardiovascular: [x ] S1 [x ] S2 [ x] RRR   Procedure site: Access site C/D/I without bleeding, induration, or hematoma.  Respiratory: [ x] Clear to auscultation bilaterally  Gastrointestinal: [ x] Soft [ x] Non-tender [x ] Non-distended  Musculoskeletal: [x ] No clubbing [ x] No joint deformity   Neurologic: [x ] Non-focal  Lymphatic: [x ] No lymphadenopathy  Psychiatry: [ x] AAOx3 [x ] Mood & affect appropriate  Skin: [x ] No rashes [x ] No ecchymoses [ x] No cyanosis    07-14    133<L>  |  94<L>  |  15  ----------------------------<  94  4.0   |  28  |  0.91    Ca    8.8      14 Jul 2019 05:08  Phos  3.2     07-14  Mg     1.9     07-14    PT/INR - ( 14 Jul 2019 05:08 )   PT: 17.6 sec;   INR: 1.52 ratio      PTT - ( 14 Jul 2019 05:08 )  PTT:34.0 sec    ECG: AF QTc 444    Echo: < from: TTE with Doppler (w/Cont) (07.06.19 @ 10:06) >  1. The aortic valve leaflets are mildly thickened.  There  is aortic sclerosis but no stenosis. No aortic valve  regurgitation seen.  2. The left ventricular size is normal  3. There is septal hypokineisis.   The LVEF is about 45%.  4. The right ventricle appears moderately dilated with  mildly reduced function.  5. There is no significantmitral or tricuspid  regurgitation.  6. There is no pericardial effusion.    Imaging: < from: Xray Chest 1 View- PORTABLE-Urgent (07.12.19 @ 23:33) >  Stable thoracic aortic atheromatous changes and ectasia..  Stable cardiomegaly.  Stable lower right lung atelectatic changes.  No bilateral focal consolidations.  No significant pleural effusion. No pneumothorax  No acute bony findings.    Interpretation of Telemetry: AF w/ frequent PVCs

## 2019-07-14 NOTE — PROGRESS NOTE ADULT - PROBLEM SELECTOR PROBLEM 3
COPD (chronic obstructive pulmonary disease)
COPD (chronic obstructive pulmonary disease)
Chronic obstructive pulmonary disease, unspecified COPD type
HTN (hypertension)
COPD (chronic obstructive pulmonary disease)
COPD (chronic obstructive pulmonary disease)
Chronic obstructive pulmonary disease, unspecified COPD type
Shortness of breath
Systolic heart failure, unspecified HF chronicity
Ventricular tachycardia

## 2019-07-14 NOTE — PROGRESS NOTE ADULT - SUBJECTIVE AND OBJECTIVE BOX
24H hour events: patient stable, no sustained VT, runs NSVT over night    MEDICATIONS:  furosemide    Tablet 40 milliGRAM(s) Oral daily  sotalol 160 milliGRAM(s) Oral every 12 hours  tamsulosin 0.8 milliGRAM(s) Oral at bedtime  ALBUTerol    90 MICROgram(s) HFA Inhaler 2 Puff(s) Inhalation every 6 hours PRN  benzonatate 100 milliGRAM(s) Oral three times a day PRN  buDESOnide 160 MICROgram(s)/formoterol 4.5 MICROgram(s) Inhaler 2 Puff(s) Inhalation two times a day  guaiFENesin    Syrup 100 milliGRAM(s) Oral every 6 hours PRN  tiotropium 18 MICROgram(s) Capsule 1 Capsule(s) Inhalation daily  docusate sodium 100 milliGRAM(s) Oral three times a day  pantoprazole    Tablet 40 milliGRAM(s) Oral before breakfast  polyethylene glycol 3350 17 Gram(s) Oral daily  senna 2 Tablet(s) Oral at bedtime  atorvastatin 10 milliGRAM(s) Oral at bedtime  finasteride 5 milliGRAM(s) Oral daily  cyanocobalamin 1000 MICROGram(s) Oral daily    REVIEW OF SYSTEMS:  Complete 10point ROS negative.    PHYSICAL EXAM:  T(C): 37.1 (07-14-19 @ 14:00), Max: 37.4 (07-13-19 @ 18:21)  HR: 78 (07-14-19 @ 15:00) (66 - 86)  BP: 113/78 (07-14-19 @ 15:00) (98/55 - 136/72)  RR: 16 (07-14-19 @ 15:00) (15 - 26)  SpO2: 94% (07-14-19 @ 15:00) (93% - 97%)    I&O's Summary    13 Jul 2019 07:01  -  14 Jul 2019 07:00  --------------------------------------------------------  IN: 640 mL / OUT: 1925 mL / NET: -1285 mL    14 Jul 2019 07:01  -  14 Jul 2019 15:05  --------------------------------------------------------  IN: 600 mL / OUT: 575 mL / NET: 25 mL    LABS:	 	    CBC Full  -  ( 14 Jul 2019 05:08 )  WBC Count : 14.7 K/uL  Hemoglobin : 13.6 g/dL  Hematocrit : 42.9 %  Platelet Count - Automated : 240 K/uL  Mean Cell Volume : 81.3 fl  Mean Cell Hemoglobin : 25.8 pg  Mean Cell Hemoglobin Concentration : 31.7 gm/dL  Auto Neutrophil # : 11.7 K/uL  Auto Lymphocyte # : 1.5 K/uL  Auto Monocyte # : 1.4 K/uL  Auto Eosinophil # : 0.1 K/uL  Auto Basophil # : 0.0 K/uL  Auto Neutrophil % : 79.6 %  Auto Lymphocyte % : 10.0 %  Auto Monocyte % : 9.6 %  Auto Eosinophil % : 0.6 %  Auto Basophil % : 0.2 %    07-14    133<L>  |  94<L>  |  15  ----------------------------<  94  4.0   |  28  |  0.91  07-13    135  |  96  |  14  ----------------------------<  104<H>  4.5   |  27  |  0.83    Ca    8.8      14 Jul 2019 05:08  Ca    8.7      13 Jul 2019 06:08  Phos  3.2     07-14  Phos  3.4     07-13  Mg     1.9     07-14  Mg     2.0     07-13    TELEMETRY: 	runs of NSVT this Am, PVCs this PM    	  ASSESSMENT/PLAN: 	  Increase sotolol to 160 BID  NPO post MN for possible ICD tomorrow  Hold apixiban tonight and in Am  type and cross

## 2019-07-15 LAB
ANION GAP SERPL CALC-SCNC: 14 MMOL/L — SIGNIFICANT CHANGE UP (ref 5–17)
BASOPHILS # BLD AUTO: 0 K/UL — SIGNIFICANT CHANGE UP (ref 0–0.2)
BASOPHILS NFR BLD AUTO: 0.2 % — SIGNIFICANT CHANGE UP (ref 0–2)
BUN SERPL-MCNC: 16 MG/DL — SIGNIFICANT CHANGE UP (ref 7–23)
CALCIUM SERPL-MCNC: 9 MG/DL — SIGNIFICANT CHANGE UP (ref 8.4–10.5)
CHLORIDE SERPL-SCNC: 90 MMOL/L — LOW (ref 96–108)
CO2 SERPL-SCNC: 28 MMOL/L — SIGNIFICANT CHANGE UP (ref 22–31)
CREAT SERPL-MCNC: 0.85 MG/DL — SIGNIFICANT CHANGE UP (ref 0.5–1.3)
EOSINOPHIL # BLD AUTO: 0.2 K/UL — SIGNIFICANT CHANGE UP (ref 0–0.5)
EOSINOPHIL NFR BLD AUTO: 1.2 % — SIGNIFICANT CHANGE UP (ref 0–6)
GLUCOSE SERPL-MCNC: 105 MG/DL — HIGH (ref 70–99)
HCT VFR BLD CALC: 42.2 % — SIGNIFICANT CHANGE UP (ref 39–50)
HGB BLD-MCNC: 12.6 G/DL — LOW (ref 13–17)
LYMPHOCYTES # BLD AUTO: 1.6 K/UL — SIGNIFICANT CHANGE UP (ref 1–3.3)
LYMPHOCYTES # BLD AUTO: 11.6 % — LOW (ref 13–44)
MAGNESIUM SERPL-MCNC: 1.9 MG/DL — SIGNIFICANT CHANGE UP (ref 1.6–2.6)
MCHC RBC-ENTMCNC: 24.1 PG — LOW (ref 27–34)
MCHC RBC-ENTMCNC: 29.8 GM/DL — LOW (ref 32–36)
MCV RBC AUTO: 80.9 FL — SIGNIFICANT CHANGE UP (ref 80–100)
MONOCYTES # BLD AUTO: 1.4 K/UL — HIGH (ref 0–0.9)
MONOCYTES NFR BLD AUTO: 10.1 % — SIGNIFICANT CHANGE UP (ref 2–14)
NEUTROPHILS # BLD AUTO: 10.6 K/UL — HIGH (ref 1.8–7.4)
NEUTROPHILS NFR BLD AUTO: 77 % — SIGNIFICANT CHANGE UP (ref 43–77)
PHOSPHATE SERPL-MCNC: 3.5 MG/DL — SIGNIFICANT CHANGE UP (ref 2.5–4.5)
PLATELET # BLD AUTO: 262 K/UL — SIGNIFICANT CHANGE UP (ref 150–400)
POTASSIUM SERPL-MCNC: 4 MMOL/L — SIGNIFICANT CHANGE UP (ref 3.5–5.3)
POTASSIUM SERPL-SCNC: 4 MMOL/L — SIGNIFICANT CHANGE UP (ref 3.5–5.3)
RBC # BLD: 5.23 M/UL — SIGNIFICANT CHANGE UP (ref 4.2–5.8)
RBC # FLD: 13.9 % — SIGNIFICANT CHANGE UP (ref 10.3–14.5)
SODIUM SERPL-SCNC: 132 MMOL/L — LOW (ref 135–145)
WBC # BLD: 13.8 K/UL — HIGH (ref 3.8–10.5)
WBC # FLD AUTO: 13.8 K/UL — HIGH (ref 3.8–10.5)

## 2019-07-15 PROCEDURE — 99233 SBSQ HOSP IP/OBS HIGH 50: CPT | Mod: GC

## 2019-07-15 PROCEDURE — 93010 ELECTROCARDIOGRAM REPORT: CPT | Mod: 76

## 2019-07-15 PROCEDURE — 33249 INSJ/RPLCMT DEFIB W/LEAD(S): CPT

## 2019-07-15 PROCEDURE — 71045 X-RAY EXAM CHEST 1 VIEW: CPT | Mod: 26

## 2019-07-15 PROCEDURE — 93010 ELECTROCARDIOGRAM REPORT: CPT

## 2019-07-15 RX ORDER — MAGNESIUM SULFATE 500 MG/ML
1 VIAL (ML) INJECTION ONCE
Refills: 0 | Status: COMPLETED | OUTPATIENT
Start: 2019-07-15 | End: 2019-07-15

## 2019-07-15 RX ORDER — CEFAZOLIN SODIUM 1 G
2000 VIAL (EA) INJECTION EVERY 8 HOURS
Refills: 0 | Status: COMPLETED | OUTPATIENT
Start: 2019-07-15 | End: 2019-07-16

## 2019-07-15 RX ORDER — ENOXAPARIN SODIUM 100 MG/ML
40 INJECTION SUBCUTANEOUS DAILY
Refills: 0 | Status: DISCONTINUED | OUTPATIENT
Start: 2019-07-15 | End: 2019-07-15

## 2019-07-15 RX ADMIN — ATORVASTATIN CALCIUM 10 MILLIGRAM(S): 80 TABLET, FILM COATED ORAL at 22:31

## 2019-07-15 RX ADMIN — Medication 100 GRAM(S): at 06:39

## 2019-07-15 RX ADMIN — BUDESONIDE AND FORMOTEROL FUMARATE DIHYDRATE 2 PUFF(S): 160; 4.5 AEROSOL RESPIRATORY (INHALATION) at 05:31

## 2019-07-15 RX ADMIN — TAMSULOSIN HYDROCHLORIDE 0.8 MILLIGRAM(S): 0.4 CAPSULE ORAL at 22:31

## 2019-07-15 RX ADMIN — PANTOPRAZOLE SODIUM 40 MILLIGRAM(S): 20 TABLET, DELAYED RELEASE ORAL at 05:44

## 2019-07-15 RX ADMIN — POLYETHYLENE GLYCOL 3350 17 GRAM(S): 17 POWDER, FOR SOLUTION ORAL at 11:21

## 2019-07-15 RX ADMIN — Medication 160 MILLIGRAM(S): at 06:39

## 2019-07-15 RX ADMIN — FINASTERIDE 5 MILLIGRAM(S): 5 TABLET, FILM COATED ORAL at 11:21

## 2019-07-15 RX ADMIN — Medication 40 MILLIGRAM(S): at 05:44

## 2019-07-15 RX ADMIN — TIOTROPIUM BROMIDE 1 CAPSULE(S): 18 CAPSULE ORAL; RESPIRATORY (INHALATION) at 12:44

## 2019-07-15 RX ADMIN — Medication 160 MILLIGRAM(S): at 19:16

## 2019-07-15 RX ADMIN — Medication 100 MILLIGRAM(S): at 17:22

## 2019-07-15 RX ADMIN — PREGABALIN 1000 MICROGRAM(S): 225 CAPSULE ORAL at 11:21

## 2019-07-15 RX ADMIN — BUDESONIDE AND FORMOTEROL FUMARATE DIHYDRATE 2 PUFF(S): 160; 4.5 AEROSOL RESPIRATORY (INHALATION) at 18:02

## 2019-07-15 NOTE — CHART NOTE - NSCHARTNOTEFT_GEN_A_CORE
====================  CCU MIDNIGHT ROUNDS  ====================    ALICE JUNIOR  [id number]  Patient is a 69y old  Male who presents with a chief complaint of syncopal episode (15 Jul 2019 15:15)    ====================  SUMMARY:  ====================  70 yo M w/ PMHx of COPD/MORRO on home O2, HTN, newly dx Afib who presented for Afib w/u on 7/5 s/p LHC 7/6 w/ clean coronaries whose hospital course was c/b multiple RRTs for Vtach transferred to CCU for Vtach w/u s/p failed VT ablation s/p ICD on 7/15.        ====================  NEW EVENTS:  ====================    ICD placement on 7/15 and continuing with sotalol 120mg BID.     ====================  VITALS (Last 12 hrs):  ====================    T(C): 37.1 (07-15-19 @ 22:00), Max: 37.1 (07-15-19 @ 22:00)  T(F): 98.7 (07-15-19 @ 22:00), Max: 98.7 (07-15-19 @ 22:00)  HR: 72 (07-15-19 @ 22:00) (68 - 968)  BP: 106/67 (07-15-19 @ 22:00) (82/57 - 115/66)  BP(mean): 82 (07-15-19 @ 22:00) (69 - 85)  ABP: --  ABP(mean): --  RR: 18 (07-15-19 @ 22:00) (15 - 30)  SpO2: 92% (07-15-19 @ 22:00) (89% - 98%)  Wt(kg): --  CVP(mm Hg): --  CVP(cm H2O): --  CO: --  CI: --  PA: --  PA(mean): --  PCWP: --  SVR: --  PVR: --    I&O's Summary    14 Jul 2019 07:01  -  15 Jul 2019 07:00  --------------------------------------------------------  IN: 820 mL / OUT: 1075 mL / NET: -255 mL    15 Jul 2019 07:01  -  15 Jul 2019 23:24  --------------------------------------------------------  IN: 350 mL / OUT: 1020 mL / NET: -670 mL            ====================  NEW LABS:  ====================                          12.6   13.8  )-----------( 262      ( 15 Jul 2019 06:00 )             42.2     07-15    132<L>  |  90<L>  |  16  ----------------------------<  105<H>  4.0   |  28  |  0.85    Ca    9.0      15 Jul 2019 06:00  Phos  3.5     07-15  Mg     1.9     07-15      PT/INR - ( 14 Jul 2019 05:08 )   PT: 17.6 sec;   INR: 1.52 ratio         PTT - ( 14 Jul 2019 05:08 )  PTT:34.0 sec            ====================  A/P:  ====================        70 yo M w/ PMHx of COPD/MORRO on home O2, HTN, newly dx Afib who presented for Afib w/u on 7/5 s/p C 7/6 w/ clean coronaries whose hospital course was c/b multiple RRTs for Vtach transferred to CCU for Vtach w/u s/p failed VT ablation with ICD placement on 7/15.     # Neuro   - no active issues     # Respiratory  * COPD/MORRO  - SpO2 93% on 3L; baseline O2 requirement 3L  - c/w supplemental O2 to maintain SpO2 > 92%  - c/w Symbicort, Spiriva and PRN albuterol     # Cardiac  * Paroxysmal Afib  - c/w Sotalol   - CHADsVASC score 2 (age 69, CHF);   - resume Eliquis on Wed 7/17    * Vtach   - s/p failed ablation  - currently on 160 BID Sotalol; daily EKGs to monitor QTc interval;   - s/p ICD placement on 7/15  - monitor on tele    * Biventricular systolic HF  - s/p TTE 07/06 showing septal hypokineisis w/ LVEF 45%, moderate RV dilation w/ mildly reduced function   - c/w Lasix 40 qd  - c/w Sotalol   - no ACE/ARB in setting of soft BPs     # GI  - no active issues    # /electrolytes  * Hyponatremia  - asymptomatic   - continue to monitor     # Endo  - A1C 5.6%    # Heme  - improving leukocytosis   - Pt w/out fevers, no bands on diff   - continue to monitor     # Prophylactic measure  - DVT ppx: SCD's until Eliquis can be redosed on Wednesday   - Diet: DASH/TLC

## 2019-07-15 NOTE — DIETITIAN INITIAL EVALUATION ADULT. - REASON INDICATOR FOR ASSESSMENT
Patient seen for initial nutrition assessment, ICU length of stay.  Per chart: 70 yo M w/ PMHx of COPD/MORRO on home O2, HTN, newly dx Afib who presented for Afib w/u on 7/5 s/p LHC 7/6 w/ clean coronaries whose hospital course was c/b multiple RRTs for Vtach transferred to CCU for Vtach w/u now s/p failed VT ablation on sotalol

## 2019-07-15 NOTE — PROGRESS NOTE ADULT - SUBJECTIVE AND OBJECTIVE BOX
PATIENT:  ALICE JUNIOR  [id number]    CHIEF COMPLAINT:  Patient is a 69y old  Male who presents with a chief complaint of VT (14 Jul 2019 15:05)      INTERVAL HISTORYOVERNIGHT EVENTS:      REVIEW OF SYSTEMS:    Constitutional:     [ ] negative [ ] fevers [ ] chills [ ] weight loss [ ] weight gain  HEENT:                  [ ] negative [ ] dry eyes [ ] eye irritation [ ] postnasal drip [ ] nasal congestion  CV:                         [ ] negative  [ ] chest pain [ ] orthopnea [ ] palpitations [ ] murmur  Resp:                     [ ] negative [ ] cough [ ] shortness of breath [ ] dyspnea [ ] wheezing [ ] sputum [ ] hemoptysis  GI:                          [ ] negative [ ] nausea [ ] vomiting [ ] diarrhea [ ] constipation [ ] abd pain [ ] dysphagia   :                        [ ] negative [ ] dysuria [ ] nocturia [ ] hematuria [ ] increased urinary frequency  Musculoskeletal: [ ] negative [ ] back pain [ ] myalgias [ ] arthralgias [ ] fracture  Skin:                       [ ] negative [ ] rash [ ] itch  Neurological:        [ ] negative [ ] headache [ ] dizziness [ ] syncope [ ] weakness [ ] numbness  Psychiatric:           [ ] negative [ ] anxiety [ ] depression  Endocrine:            [ ] negative [ ] diabetes [ ] thyroid problem  Heme/Lymph:      [ ] negative [ ] anemia [ ] bleeding problem  Allergic/Immune: [ ] negative [ ] itchy eyes [ ] nasal discharge [ ] hives [ ] angioedema    [ ] All other systems negative  [ ] Unable to assess ROS because ________.    MEDICATIONS:  MEDICATIONS  (STANDING):  atorvastatin 10 milliGRAM(s) Oral at bedtime  buDESOnide 160 MICROgram(s)/formoterol 4.5 MICROgram(s) Inhaler 2 Puff(s) Inhalation two times a day  chlorhexidine 2% Cloths 1 Application(s) Topical daily  cyanocobalamin 1000 MICROGram(s) Oral daily  docusate sodium 100 milliGRAM(s) Oral three times a day  finasteride 5 milliGRAM(s) Oral daily  furosemide    Tablet 40 milliGRAM(s) Oral daily  pantoprazole    Tablet 40 milliGRAM(s) Oral before breakfast  polyethylene glycol 3350 17 Gram(s) Oral daily  senna 2 Tablet(s) Oral at bedtime  sotalol 160 milliGRAM(s) Oral every 12 hours  tamsulosin 0.8 milliGRAM(s) Oral at bedtime  tiotropium 18 MICROgram(s) Capsule 1 Capsule(s) Inhalation daily    MEDICATIONS  (PRN):  ALBUTerol    90 MICROgram(s) HFA Inhaler 2 Puff(s) Inhalation every 6 hours PRN Shortness of Breath and/or Wheezing  benzonatate 100 milliGRAM(s) Oral three times a day PRN Cough  guaiFENesin    Syrup 100 milliGRAM(s) Oral every 6 hours PRN Cough      ALLERGIES:  Allergies    No Known Allergies    Intolerances        OBJECTIVE:  ICU Vital Signs Last 24 Hrs  T(C): 36.6 (15 Jul 2019 07:00), Max: 37.1 (14 Jul 2019 08:00)  T(F): 97.8 (15 Jul 2019 07:00), Max: 98.8 (14 Jul 2019 08:00)  HR: 74 (15 Jul 2019 07:00) (70 - 86)  BP: 106/73 (15 Jul 2019 07:00) (83/57 - 132/86)  BP(mean): 83 (15 Jul 2019 07:00) (63 - 98)  ABP: --  ABP(mean): --  RR: 19 (15 Jul 2019 07:00) (15 - 36)  SpO2: 93% (15 Jul 2019 07:00) (87% - 96%)      Adult Advanced Hemodynamics Last 24 Hrs  CVP(mm Hg): --  CVP(cm H2O): --  CO: --  CI: --  PA: --  PA(mean): --  PCWP: --  SVR: --  SVRI: --  PVR: --  PVRI: --  CAPILLARY BLOOD GLUCOSE        CAPILLARY BLOOD GLUCOSE        I&O's Summary    14 Jul 2019 07:01  -  15 Jul 2019 07:00  --------------------------------------------------------  IN: 820 mL / OUT: 1075 mL / NET: -255 mL      Daily     Daily     PHYSICAL EXAMINATION:  General: WN/WD NAD  HEENT: PERRLA, EOMI, moist mucous membranes  Neurology: A&Ox3, nonfocal, MATHIS x 4  Respiratory: CTA B/L, normal respiratory effort, no wheezes, crackles, rales  CV: RRR, S1S2, no murmurs, rubs or gallops  Abdominal: Soft, NT, ND +BS, Last BM  Extremities: No edema, + peripheral pulses  Incisions:   Tubes:    LABS:                          12.6   13.8  )-----------( 262      ( 15 Jul 2019 06:00 )             42.2     07-15    132<L>  |  90<L>  |  16  ----------------------------<  105<H>  4.0   |  28  |  0.85    Ca    9.0      15 Jul 2019 06:00  Phos  3.5     07-15  Mg     1.9     07-15        PT/INR - ( 14 Jul 2019 05:08 )   PT: 17.6 sec;   INR: 1.52 ratio         PTT - ( 14 Jul 2019 05:08 )  PTT:34.0 sec            TELEMETRY:     EKG:     IMAGING: PATIENT:  ALICE JUNIOR  [id number]    CHIEF COMPLAINT:  Patient is a 69y old  Male who presents with a chief complaint of VT (2019 15:05)      INTERVAL HISTORYOVERNIGHT EVENTS:  No events overnight. ROS neg for CP, SOB, abdominal pain, N/V/D/C or dysuria.       REVIEW OF SYSTEMS:    Constitutional:     [ ] negative [ ] fevers [ ] chills [ ] weight loss [ ] weight gain  HEENT:                  [ ] negative [ ] dry eyes [ ] eye irritation [ ] postnasal drip [ ] nasal congestion  CV:                         [ ] negative  [ ] chest pain [ ] orthopnea [ ] palpitations [ ] murmur  Resp:                     [ ] negative [ ] cough [ ] shortness of breath [ ] dyspnea [ ] wheezing [ ] sputum [ ] hemoptysis  GI:                          [ ] negative [ ] nausea [ ] vomiting [ ] diarrhea [ ] constipation [ ] abd pain [ ] dysphagia   :                        [ ] negative [ ] dysuria [ ] nocturia [ ] hematuria [ ] increased urinary frequency  Musculoskeletal: [ ] negative [ ] back pain [ ] myalgias [ ] arthralgias [ ] fracture  Skin:                       [ ] negative [ ] rash [ ] itch  Neurological:        [ ] negative [ ] headache [ ] dizziness [ ] syncope [ ] weakness [ ] numbness  Psychiatric:           [ ] negative [ ] anxiety [ ] depression  Endocrine:            [ ] negative [ ] diabetes [ ] thyroid problem  Heme/Lymph:      [ ] negative [ ] anemia [ ] bleeding problem  Allergic/Immune: [ ] negative [ ] itchy eyes [ ] nasal discharge [ ] hives [ ] angioedema    [ ] All other systems negative  [ ] Unable to assess ROS because ________.    MEDICATIONS:  MEDICATIONS  (STANDING):  atorvastatin 10 milliGRAM(s) Oral at bedtime  buDESOnide 160 MICROgram(s)/formoterol 4.5 MICROgram(s) Inhaler 2 Puff(s) Inhalation two times a day  chlorhexidine 2% Cloths 1 Application(s) Topical daily  cyanocobalamin 1000 MICROGram(s) Oral daily  docusate sodium 100 milliGRAM(s) Oral three times a day  finasteride 5 milliGRAM(s) Oral daily  furosemide    Tablet 40 milliGRAM(s) Oral daily  pantoprazole    Tablet 40 milliGRAM(s) Oral before breakfast  polyethylene glycol 3350 17 Gram(s) Oral daily  senna 2 Tablet(s) Oral at bedtime  sotalol 160 milliGRAM(s) Oral every 12 hours  tamsulosin 0.8 milliGRAM(s) Oral at bedtime  tiotropium 18 MICROgram(s) Capsule 1 Capsule(s) Inhalation daily    MEDICATIONS  (PRN):  ALBUTerol    90 MICROgram(s) HFA Inhaler 2 Puff(s) Inhalation every 6 hours PRN Shortness of Breath and/or Wheezing  benzonatate 100 milliGRAM(s) Oral three times a day PRN Cough  guaiFENesin    Syrup 100 milliGRAM(s) Oral every 6 hours PRN Cough      ALLERGIES:  Allergies    No Known Allergies    Intolerances        OBJECTIVE:  ICU Vital Signs Last 24 Hrs  T(C): 36.6 (15 Jul 2019 07:00), Max: 37.1 (2019 08:00)  T(F): 97.8 (15 Jul 2019 07:00), Max: 98.8 (2019 08:00)  HR: 74 (15 Jul 2019 07:00) (70 - 86)  BP: 106/73 (15 Jul 2019 07:00) (83/57 - 132/86)  BP(mean): 83 (15 Jul 2019 07:00) (63 - 98)  ABP: --  ABP(mean): --  RR: 19 (15 Jul 2019 07:00) (15 - 36)  SpO2: 93% (15 Jul 2019 07:00) (87% - 96%)      Adult Advanced Hemodynamics Last 24 Hrs  CVP(mm Hg): --  CVP(cm H2O): --  CO: --  CI: --  PA: --  PA(mean): --  PCWP: --  SVR: --  SVRI: --  PVR: --  PVRI: --  CAPILLARY BLOOD GLUCOSE        CAPILLARY BLOOD GLUCOSE        I&O's Summary    2019 07:01  -  15 Jul 2019 07:00  --------------------------------------------------------  IN: 820 mL / OUT: 1075 mL / NET: -255 mL      Daily     Daily     PHYSICAL EXAMINATION:  General: WN/WD NAD  HEENT: PERRLA, EOMI, moist mucous membranes  Neurology: A&Ox3, nonfocal, MATHIS x 4  Respiratory: CTA B/L, normal respiratory effort, no wheezes, crackles, rales  CV: RRR, S1S2, no murmurs, rubs or gallops  Abdominal: Soft, NT, ND +BS, Last BM  Extremities: No edema, + peripheral pulses  Incisions:   Tubes:    LABS:                          12.6   13.8  )-----------( 262      ( 15 Jul 2019 06:00 )             42.2     07-15    132<L>  |  90<L>  |  16  ----------------------------<  105<H>  4.0   |  28  |  0.85    Ca    9.0      15 Jul 2019 06:00  Phos  3.5     07-15  Mg     1.9     07-15        PT/INR - ( 2019 05:08 )   PT: 17.6 sec;   INR: 1.52 ratio         PTT - ( 2019 05:08 )  PTT:34.0 sec            TELEMETRY: no VT     EK/14 QTc 475ms

## 2019-07-15 NOTE — DIETITIAN INITIAL EVALUATION ADULT. - PHYSICAL APPEARANCE
other (specify) Skin per nursing documentation: no pressure injuries noted  Edema: 1+ L/R ankle  No overt wasting noted though limited view

## 2019-07-15 NOTE — PROGRESS NOTE ADULT - SUBJECTIVE AND OBJECTIVE BOX
24H hour events: s/p ICD implant. EKG from this afternoon shows stable QTc (read as 600ms but incorrect and measured QTc ~0.48)    MEDICATIONS:  furosemide    Tablet 40 milliGRAM(s) Oral daily  sotalol 160 milliGRAM(s) Oral every 12 hours  tamsulosin 0.8 milliGRAM(s) Oral at bedtime  ceFAZolin   IVPB 2000 milliGRAM(s) IV Intermittent every 8 hours  ALBUTerol    90 MICROgram(s) HFA Inhaler 2 Puff(s) Inhalation every 6 hours PRN  benzonatate 100 milliGRAM(s) Oral three times a day PRN  buDESOnide 160 MICROgram(s)/formoterol 4.5 MICROgram(s) Inhaler 2 Puff(s) Inhalation two times a day  guaiFENesin    Syrup 100 milliGRAM(s) Oral every 6 hours PRN  tiotropium 18 MICROgram(s) Capsule 1 Capsule(s) Inhalation daily  docusate sodium 100 milliGRAM(s) Oral three times a day  pantoprazole    Tablet 40 milliGRAM(s) Oral before breakfast  polyethylene glycol 3350 17 Gram(s) Oral daily  senna 2 Tablet(s) Oral at bedtime  atorvastatin 10 milliGRAM(s) Oral at bedtime  finasteride 5 milliGRAM(s) Oral daily  chlorhexidine 2% Cloths 1 Application(s) Topical daily  cyanocobalamin 1000 MICROGram(s) Oral daily      REVIEW OF SYSTEMS:  See HPI, otherwise ROS negative.    PHYSICAL EXAM:  T(C): 36.9 (07-15-19 @ 15:00), Max: 36.9 (07-15-19 @ 15:00)  HR: 76 (07-15-19 @ 17:00) (68 - 968)  BP: 100/62 (07-15-19 @ 17:00) (81/59 - 118/76)  RR: 18 (07-15-19 @ 17:00) (15 - 36)  SpO2: 91% (07-15-19 @ 17:00) (87% - 98%)  Wt(kg): --  I&O's Summary    14 Jul 2019 07:01  -  15 Jul 2019 07:00  --------------------------------------------------------  IN: 820 mL / OUT: 1075 mL / NET: -255 mL    15 Jul 2019 07:01  -  15 Jul 2019 17:17  --------------------------------------------------------  IN: 290 mL / OUT: 520 mL / NET: -230 mL        Appearance: Alert. NAD	  Cardiovascular: +S1S2 irreg, irreg  Respiratory: CTA B/L	  Psychiatry: A & O x 3, Mood & affect appropriate  Neurologic: Non-focal  Extremities: No edema BLE  Vascular: Peripheral pulses palpable 2+ bilaterally      LABS:	 	    CBC Full  -  ( 15 Jul 2019 06:00 )  WBC Count : 13.8 K/uL  Hemoglobin : 12.6 g/dL  Hematocrit : 42.2 %  Platelet Count - Automated : 262 K/uL  Mean Cell Volume : 80.9 fl  Mean Cell Hemoglobin : 24.1 pg  Mean Cell Hemoglobin Concentration : 29.8 gm/dL  Auto Neutrophil # : 10.6 K/uL  Auto Lymphocyte # : 1.6 K/uL  Auto Monocyte # : 1.4 K/uL  Auto Eosinophil # : 0.2 K/uL  Auto Basophil # : 0.0 K/uL  Auto Neutrophil % : 77.0 %  Auto Lymphocyte % : 11.6 %  Auto Monocyte % : 10.1 %  Auto Eosinophil % : 1.2 %  Auto Basophil % : 0.2 %    07-15    132<L>  |  90<L>  |  16  ----------------------------<  105<H>  4.0   |  28  |  0.85  07-14    133<L>  |  94<L>  |  15  ----------------------------<  94  4.0   |  28  |  0.91    Ca    9.0      15 Jul 2019 06:00  Ca    8.8      14 Jul 2019 05:08  Phos  3.5     07-15  Phos  3.2     07-14  Mg     1.9     07-15  Mg     1.9     07-14    TELEMETRY: AF w/ VR 60's bpm  	    ECG(7/15/19): AF; QTc ~0.48  	    	  ASSESSMENT/PLAN: 	  68y/o male h/o HTN, HLD, COPD, MORRO on CPAP/3L oxygen at home, recently diagnosed with AF on Eliquis/ Cardizem p/w syncope s/p Cardiac cath on 7/8- normal coronary arteries, had symptomatic sustained VT that spontaneously terminated s/p attempted VT ablation 7/12, started on sotalol s/p [employer] ICD 7/15  --On Sotalol 160mg BID. QTc stable ~0.48  --PA & lateral CXR in am    Perla Boogie PA-C  [id number]

## 2019-07-15 NOTE — DIETITIAN INITIAL EVALUATION ADULT. - ADD RECOMMEND
1) Continue DASH diet. 2) Monitor %po intake. 3) RD remains available PRN for diet education; Pt made aware.

## 2019-07-15 NOTE — DIETITIAN INITIAL EVALUATION ADULT. - PROBLEM SELECTOR PLAN 1
s/p syncopal episode  w/ hx of recently diagnosed w/ afib   CT brain done at [facility] showed chronic encephalomalacia of frontal lobes No acute changes  currently afib on tele w/ vent rate 'S  - Tele  - EP consult for possible EP STUDY  - Cont. BB   - Eliquis to hep [facility] for ischemic work up Monday by Dr. Hudson (7/8)

## 2019-07-15 NOTE — PROGRESS NOTE ADULT - ASSESSMENT
68 yo M w/ PMHx of COPD/MORRO on home O2, HTN, newly dx Afib who presented for Afib w/u on 7/5 s/p C 7/6 w/ clean coronaries whose hospital course was c/b multiple RRTs for Vtach transferred to CCU for Vtach w/u now s/p failed VT ablation on sotalol    # Neuro   - no active issues     # Respiratory  * COPD/MORRO  - SpO2 93% on 3L; baseline O2 requirement  - c/w supplemental O2 to maintain SpO2 > 92%  - c/w Symbicort, Spiriva and PRN albuterol     # Cardiac  * Paroxysmal Afib  - c/w Sotalol   - CHADsVASC score 2 (age 69, CHF); Eliquis on hold for ICD implantation today     * Vtach   - s/p failed ablation  - currently on 160 BID Sotalol; daily EKGs to monitor QTc interval  - pending ICD placement today   - monitor on tele    * Biventricular systolic HF  - s/p TTE 07/06 showing septal hypokineisis w/ LVEF 45%, moderate RV dilation w/ mildly reduced function   - c/w Lasix 40 qd  - c/w Sotalol   - ACE/ARB?     # GI  - no active issues    # /electrolytes  * Hyponatremia  - asymptomatic   - continue to monitor     # Endo  - A1C 5.6%    # Prophylactic measure  - DVT ppx: Lovenox 40 qd  - Diet: DASH/TLC    Rebecca Lake MD  Internal Medicine, PGY-2   [phone] 70 yo M w/ PMHx of COPD/MORRO on home O2, HTN, newly dx Afib who presented for Afib w/u on 7/5 s/p C 7/6 w/ clean coronaries whose hospital course was c/b multiple RRTs for Vtach transferred to CCU for Vtach w/u now s/p failed VT ablation on sotalol    # Neuro   - no active issues     # Respiratory  * COPD/MORRO  - SpO2 93% on 3L; baseline O2 requirement  - c/w supplemental O2 to maintain SpO2 > 92%  - c/w Symbicort, Spiriva and PRN albuterol     # Cardiac  * Paroxysmal Afib  - c/w Sotalol   - CHADsVASC score 2 (age 69, CHF); Eliquis on hold for ICD implantation today     * Vtach   - s/p failed ablation  - currently on 160 BID Sotalol; daily EKGs to monitor QTc interval  - pending ICD placement today   - monitor on tele    * Biventricular systolic HF  - s/p TTE 07/06 showing septal hypokineisis w/ LVEF 45%, moderate RV dilation w/ mildly reduced function   - c/w Lasix 40 qd  - c/w Sotalol   - ACE/ARB?     # GI  - no active issues    # /electrolytes  * Hyponatremia  - asymptomatic   - continue to monitor     # Endo  - A1C 5.6%    # Prophylactic measure  - DVT ppx: Eliquis   - Diet: DASH/TLC    Rebecca Lake MD  Internal Medicine, PGY-2   [phone] 68 yo M w/ PMHx of COPD/MORRO on home O2, HTN, newly dx Afib who presented for Afib w/u on 7/5 s/p LHC 7/6 w/ clean coronaries whose hospital course was c/b multiple RRTs for Vtach transferred to CCU for Vtach w/u now s/p failed VT ablation on sotalol    # Neuro   - no active issues     # Respiratory  * COPD/MORRO  - SpO2 93% on 3L; baseline O2 requirement 3L  - c/w supplemental O2 to maintain SpO2 > 92%  - c/w Symbicort, Spiriva and PRN albuterol     # Cardiac  * Paroxysmal Afib  - c/w Sotalol   - CHADsVASC score 2 (age 69, CHF); Eliquis on hold for ICD implantation today     * Vtach   - s/p failed ablation  - currently on 160 BID Sotalol; daily EKGs to monitor QTc interval  - pending ICD placement today   - monitor on tele    * Biventricular systolic HF  - s/p TTE 07/06 showing septal hypokineisis w/ LVEF 45%, moderate RV dilation w/ mildly reduced function   - c/w Lasix 40 qd  - c/w Sotalol   - ACE/ARB?     # GI  - no active issues    # /electrolytes  * Hyponatremia  - asymptomatic   - continue to monitor     # Endo  - A1C 5.6%    # Heme  - improving leukocytosis   - Pt w/out fevers, no bands on diff   - continue to monitor     # Prophylactic measure  - DVT ppx: Eliquis   - Diet: DASH/TLC    Rebecca Lake MD  Internal Medicine, PGY-2   [phone] 70 yo M w/ PMHx of COPD/MORRO on home O2, HTN, newly dx Afib who presented for Afib w/u on 7/5 s/p LHC 7/6 w/ clean coronaries whose hospital course was c/b multiple RRTs for Vtach transferred to CCU for Vtach w/u now s/p failed VT ablation on sotalol    # Neuro   - no active issues     # Respiratory  * COPD/MORRO  - SpO2 93% on 3L; baseline O2 requirement 3L  - c/w supplemental O2 to maintain SpO2 > 92%  - c/w Symbicort, Spiriva and PRN albuterol     # Cardiac  * Paroxysmal Afib  - c/w Sotalol   - CHADsVASC score 2 (age 69, CHF); Eliquis on hold for ICD implantation today; will resume on Wednesday     * Vtach   - s/p failed ablation  - currently on 160 BID Sotalol; daily EKGs to monitor QTc interval; 475ms overnight  - pending ICD placement today   - monitor on tele    * Biventricular systolic HF  - s/p TTE 07/06 showing septal hypokineisis w/ LVEF 45%, moderate RV dilation w/ mildly reduced function   - c/w Lasix 40 qd  - c/w Sotalol   - no ACE/ARB in setting of soft BPs     # GI  - no active issues    # /electrolytes  * Hyponatremia  - asymptomatic   - continue to monitor     # Endo  - A1C 5.6%    # Heme  - improving leukocytosis   - Pt w/out fevers, no bands on diff   - continue to monitor     # Prophylactic measure  - DVT ppx: SCD's until Eliquis can be redosed on Wednesday   - Diet: DASH/TLC    Rebecca Lake MD  Internal Medicine, PGY-2   [phone]

## 2019-07-15 NOTE — DIETITIAN INITIAL EVALUATION ADULT. - OTHER INFO
INFORMATION PTA  Source: Patient, wife at bedside, electronic medical record  Patient provided limited information during RD visit, ?feeling unwell.  Made aware RD remains available PRN.  •         Diet PTA: Unable to assess adequacy of diet PTA; Pt notes that he does not monitor sodium/salt intake, denies therapeutic diet PTA.  •         Nutrition Supplements PTA: Notes no supplements PTA; B12 listed in H&P, also noted this admission.  •         Food Allergies: none noted per Pt   •         Weight History PTA: Denies daily weights - believes weight was 2976 lbs at previous outpatient visit.  Per previous RD note (May 2016), Pt reported usual body weight of 244 lbs, gained weight to 324.4 lbs (5/31/16) in context of decreased physical activity.  Most recent standing weight this admission: 284.3 lbs on 7/9/19. ?intentional weight loss, though not at previously reported baseline of 244 lbs.  INFORMATION THIS ADMISSION  •         Last BM: 7/12/19, bowel regimen noted  •         Other Subjective Information: Patient denies n/v, chewing/swallowing issues. 'Everything is fine"   •         Therapeutic Diet Education Provided: Not interested in diet education at this time, Pt c/o hunger, waiting for lunch tray.  Patient made aware RD remains available PRN.

## 2019-07-16 LAB
ANION GAP SERPL CALC-SCNC: 14 MMOL/L — SIGNIFICANT CHANGE UP (ref 5–17)
BASOPHILS # BLD AUTO: 0 K/UL — SIGNIFICANT CHANGE UP (ref 0–0.2)
BASOPHILS NFR BLD AUTO: 0 % — SIGNIFICANT CHANGE UP (ref 0–2)
BUN SERPL-MCNC: 18 MG/DL — SIGNIFICANT CHANGE UP (ref 7–23)
CALCIUM SERPL-MCNC: 8.9 MG/DL — SIGNIFICANT CHANGE UP (ref 8.4–10.5)
CHLORIDE SERPL-SCNC: 90 MMOL/L — LOW (ref 96–108)
CO2 SERPL-SCNC: 28 MMOL/L — SIGNIFICANT CHANGE UP (ref 22–31)
CREAT SERPL-MCNC: 0.89 MG/DL — SIGNIFICANT CHANGE UP (ref 0.5–1.3)
EOSINOPHIL # BLD AUTO: 0.2 K/UL — SIGNIFICANT CHANGE UP (ref 0–0.5)
EOSINOPHIL NFR BLD AUTO: 1.4 % — SIGNIFICANT CHANGE UP (ref 0–6)
GLUCOSE SERPL-MCNC: 99 MG/DL — SIGNIFICANT CHANGE UP (ref 70–99)
HCT VFR BLD CALC: 41.1 % — SIGNIFICANT CHANGE UP (ref 39–50)
HGB BLD-MCNC: 13.4 G/DL — SIGNIFICANT CHANGE UP (ref 13–17)
LYMPHOCYTES # BLD AUTO: 1.3 K/UL — SIGNIFICANT CHANGE UP (ref 1–3.3)
LYMPHOCYTES # BLD AUTO: 11.8 % — LOW (ref 13–44)
MAGNESIUM SERPL-MCNC: 1.9 MG/DL — SIGNIFICANT CHANGE UP (ref 1.6–2.6)
MCHC RBC-ENTMCNC: 26.3 PG — LOW (ref 27–34)
MCHC RBC-ENTMCNC: 32.5 GM/DL — SIGNIFICANT CHANGE UP (ref 32–36)
MCV RBC AUTO: 81 FL — SIGNIFICANT CHANGE UP (ref 80–100)
MONOCYTES # BLD AUTO: 1.2 K/UL — HIGH (ref 0–0.9)
MONOCYTES NFR BLD AUTO: 10.2 % — SIGNIFICANT CHANGE UP (ref 2–14)
NEUTROPHILS # BLD AUTO: 8.7 K/UL — HIGH (ref 1.8–7.4)
NEUTROPHILS NFR BLD AUTO: 76.6 % — SIGNIFICANT CHANGE UP (ref 43–77)
PHOSPHATE SERPL-MCNC: 3.4 MG/DL — SIGNIFICANT CHANGE UP (ref 2.5–4.5)
PLATELET # BLD AUTO: 251 K/UL — SIGNIFICANT CHANGE UP (ref 150–400)
POTASSIUM SERPL-MCNC: 3.8 MMOL/L — SIGNIFICANT CHANGE UP (ref 3.5–5.3)
POTASSIUM SERPL-SCNC: 3.8 MMOL/L — SIGNIFICANT CHANGE UP (ref 3.5–5.3)
RBC # BLD: 5.08 M/UL — SIGNIFICANT CHANGE UP (ref 4.2–5.8)
RBC # FLD: 14 % — SIGNIFICANT CHANGE UP (ref 10.3–14.5)
SODIUM SERPL-SCNC: 132 MMOL/L — LOW (ref 135–145)
WBC # BLD: 11.4 K/UL — HIGH (ref 3.8–10.5)
WBC # FLD AUTO: 11.4 K/UL — HIGH (ref 3.8–10.5)

## 2019-07-16 PROCEDURE — 99233 SBSQ HOSP IP/OBS HIGH 50: CPT | Mod: GC

## 2019-07-16 PROCEDURE — 93010 ELECTROCARDIOGRAM REPORT: CPT

## 2019-07-16 PROCEDURE — 71046 X-RAY EXAM CHEST 2 VIEWS: CPT | Mod: 26

## 2019-07-16 RX ORDER — SOTALOL HCL 120 MG
1 TABLET ORAL
Qty: 60 | Refills: 0
Start: 2019-07-16 | End: 2019-08-14

## 2019-07-16 RX ORDER — MAGNESIUM SULFATE 500 MG/ML
1 VIAL (ML) INJECTION ONCE
Refills: 0 | Status: COMPLETED | OUTPATIENT
Start: 2019-07-16 | End: 2019-07-16

## 2019-07-16 RX ORDER — POTASSIUM CHLORIDE 20 MEQ
20 PACKET (EA) ORAL ONCE
Refills: 0 | Status: COMPLETED | OUTPATIENT
Start: 2019-07-16 | End: 2019-07-16

## 2019-07-16 RX ORDER — MAGNESIUM SULFATE 500 MG/ML
2 VIAL (ML) INJECTION ONCE
Refills: 0 | Status: DISCONTINUED | OUTPATIENT
Start: 2019-07-16 | End: 2019-07-16

## 2019-07-16 RX ORDER — APIXABAN 2.5 MG/1
5 TABLET, FILM COATED ORAL EVERY 12 HOURS
Refills: 0 | Status: DISCONTINUED | OUTPATIENT
Start: 2019-07-17 | End: 2019-07-17

## 2019-07-16 RX ORDER — POTASSIUM CHLORIDE 20 MEQ
20 PACKET (EA) ORAL ONCE
Refills: 0 | Status: DISCONTINUED | OUTPATIENT
Start: 2019-07-16 | End: 2019-07-16

## 2019-07-16 RX ADMIN — Medication 100 GRAM(S): at 06:53

## 2019-07-16 RX ADMIN — Medication 100 MILLIGRAM(S): at 02:06

## 2019-07-16 RX ADMIN — ALBUTEROL 2 PUFF(S): 90 AEROSOL, METERED ORAL at 13:40

## 2019-07-16 RX ADMIN — Medication 100 MILLIGRAM(S): at 21:08

## 2019-07-16 RX ADMIN — POLYETHYLENE GLYCOL 3350 17 GRAM(S): 17 POWDER, FOR SOLUTION ORAL at 12:58

## 2019-07-16 RX ADMIN — Medication 20 MILLIEQUIVALENT(S): at 06:53

## 2019-07-16 RX ADMIN — ATORVASTATIN CALCIUM 10 MILLIGRAM(S): 80 TABLET, FILM COATED ORAL at 21:07

## 2019-07-16 RX ADMIN — Medication 160 MILLIGRAM(S): at 17:51

## 2019-07-16 RX ADMIN — Medication 40 MILLIGRAM(S): at 06:12

## 2019-07-16 RX ADMIN — FINASTERIDE 5 MILLIGRAM(S): 5 TABLET, FILM COATED ORAL at 12:58

## 2019-07-16 RX ADMIN — Medication 100 MILLIGRAM(S): at 13:00

## 2019-07-16 RX ADMIN — Medication 160 MILLIGRAM(S): at 08:17

## 2019-07-16 RX ADMIN — SENNA PLUS 2 TABLET(S): 8.6 TABLET ORAL at 21:08

## 2019-07-16 RX ADMIN — CHLORHEXIDINE GLUCONATE 1 APPLICATION(S): 213 SOLUTION TOPICAL at 21:08

## 2019-07-16 RX ADMIN — TIOTROPIUM BROMIDE 1 CAPSULE(S): 18 CAPSULE ORAL; RESPIRATORY (INHALATION) at 12:27

## 2019-07-16 RX ADMIN — BUDESONIDE AND FORMOTEROL FUMARATE DIHYDRATE 2 PUFF(S): 160; 4.5 AEROSOL RESPIRATORY (INHALATION) at 17:22

## 2019-07-16 RX ADMIN — PREGABALIN 1000 MICROGRAM(S): 225 CAPSULE ORAL at 12:58

## 2019-07-16 RX ADMIN — BUDESONIDE AND FORMOTEROL FUMARATE DIHYDRATE 2 PUFF(S): 160; 4.5 AEROSOL RESPIRATORY (INHALATION) at 06:56

## 2019-07-16 RX ADMIN — PANTOPRAZOLE SODIUM 40 MILLIGRAM(S): 20 TABLET, DELAYED RELEASE ORAL at 06:12

## 2019-07-16 RX ADMIN — TAMSULOSIN HYDROCHLORIDE 0.8 MILLIGRAM(S): 0.4 CAPSULE ORAL at 21:08

## 2019-07-16 NOTE — CHART NOTE - NSCHARTNOTEFT_GEN_A_CORE
====================  CCU MIDNIGHT ROUNDS  ====================    ALICE JUNIOR  [id number]  Patient is a 69y old  Male who presents with a chief complaint of syncopal episode (16 Jul 2019 17:08)    ====================  SUMMARY:  ====================    70 yo M w/ PMHx of COPD/MORRO on home O2, HTN, newly dx Afib who presented for Afib w/u on 7/5 s/p LHC 7/6 w/ clean coronaries whose hospital course was c/b multiple RRTs for Vtach transferred to CCU for Vtach w/u now s/p failed VT ablation, now with ICD on sotalol.        ====================  NEW EVENTS:  ====================    No episodes of Vtach.        ====================  VITALS (Last 12 hrs):  ====================    T(C): 37 (07-16-19 @ 20:00), Max: 37 (07-16-19 @ 20:00)  T(F): 98.6 (07-16-19 @ 20:00), Max: 98.6 (07-16-19 @ 20:00)  HR: 80 (07-16-19 @ 21:00) (70 - 112)  BP: 93/67 (07-16-19 @ 21:00) (86/64 - 116/68)  BP(mean): 74 (07-16-19 @ 21:00) (53 - 96)  ABP: --  ABP(mean): --  RR: 20 (07-16-19 @ 21:00) (17 - 35)  SpO2: 95% (07-16-19 @ 21:00) (92% - 95%)  Wt(kg): --  CVP(mm Hg): --  CVP(cm H2O): --  CO: --  CI: --  PA: --  PA(mean): --  PCWP: --  SVR: --  PVR: --    I&O's Summary    15 Jul 2019 07:01  -  16 Jul 2019 07:00  --------------------------------------------------------  IN: 450 mL / OUT: 1020 mL / NET: -570 mL    16 Jul 2019 07:01  -  16 Jul 2019 22:15  --------------------------------------------------------  IN: 720 mL / OUT: 475 mL / NET: 245 mL            ====================  NEW LABS:  ====================                          13.4   11.4  )-----------( 251      ( 16 Jul 2019 06:00 )             41.1     07-16    132<L>  |  90<L>  |  18  ----------------------------<  99  3.8   |  28  |  0.89    Ca    8.9      16 Jul 2019 06:00  Phos  3.4     07-16  Mg     1.9     07-16                  ====================  A/P:  ====================  70 yo M w/ PMHx of COPD/MORRO on home O2, HTN, newly dx Afib who presented for Afib w/u on 7/5 s/p LHC 7/6 w/ clean coronaries whose hospital course was c/b multiple RRTs for Vtach transferred to CCU for Vtach w/u now s/p failed VT ablation, now with ICD on sotalol.      # Neuro   - no active issues     # Respiratory  * COPD/MORRO  - SpO2 93% on 3L; baseline O2 requirement 3L  - c/w supplemental O2 to maintain SpO2 > 92%  - c/w Symbicort, Spiriva and PRN albuterol     # Cardiac  * Paroxysmal Afib  - c/w Sotalol   - CHADsVASC score 2 (age 69, CHF); Eliquis on hold 2/2 ICD placement yesterday; will resume on Wednesday     * Vtach   - s/p failed ablation  - currently on 160 BID Sotalol; daily EKGs to monitor QTc interval; 7:30 AM EKG Qtc 451ms; dosed Sotaolol.    - continue to obtain EKGs prior to Sotalol dosing and hold if Qtc > 500ms   - s/p ICD placement 7/15   - monitor on tele  - EP following; appreciate recs     * Biventricular systolic HF  - s/p TTE 07/06 showing septal hypokineisis w/ LVEF 45%, moderate RV dilation w/ mildly reduced function   - c/w Lasix 40 qd  - c/w Sotalol   - no ACE/ARB in setting of soft BPs     # GI  - no active issues    # /electrolytes  * Hyponatremia  - asymptomatic   - stable; will continue to monitor     # Endo  - A1C 5.6%    # Heme  - improving leukocytosis   - Pt w/out fevers, no bands on diff   - continue to monitor     # Prophylactic measure  - DVT ppx: SCD's until Eliquis can be redosed on Wednesday   - Diet: DASH/TLC  - Dispo: home w/ home PT

## 2019-07-16 NOTE — CONSULT NOTE ADULT - SUBJECTIVE AND OBJECTIVE BOX
Behavioral Cardiology Psychological Assessment    History of present illness: Mr. Mchugh is a 69-year-old male with PMH of COPD/MORRO on home O2, HTN, and newly diagnosed Afib who presented for Afib on . S/p left heart catheterization, hospital course c/b multiple RRTs for Vtach. Transferred to CCU for Vtach, now s/p failed VT ablation , started on sotalol s/p ICD placement 7/15.   Social history:  Patient is , resides with wife Lissy in "temporary" place in [facility]; patient noted that his house is being rebuilt and so they are staying with his sister in law until they can return to their home. 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 No children; has a beloved dog named Sola. Parents are . Two sisters and two brothers, all live in [place].    Adherence to treatment recommendations:  Reports good adherence to low sodium diet, medications, and medical appointments. Notes that his wife cooks meals and she follows the recommendations. Describes himself as very inactive at home and expresses desire to become more active by [place] after discharge from hospital.    Past psychiatric history: Endorses history of alcoholism. Stopped drinking heavily after his mother’s death 18 years ago. Still drinks occasionally (3-4 drinks per occasion, several times per year at special events like weddings). Also attends AA. Denies history of anxiety/depression, denies other psych history.

## 2019-07-16 NOTE — PROGRESS NOTE ADULT - SUBJECTIVE AND OBJECTIVE BOX
24H hour events: Pt without complaints. Tele shows AF w/ occasional PVC's    MEDICATIONS:  furosemide    Tablet 40 milliGRAM(s) Oral daily  sotalol 160 milliGRAM(s) Oral every 12 hours  tamsulosin 0.8 milliGRAM(s) Oral at bedtime  ALBUTerol    90 MICROgram(s) HFA Inhaler 2 Puff(s) Inhalation every 6 hours PRN  benzonatate 100 milliGRAM(s) Oral three times a day PRN  buDESOnide 160 MICROgram(s)/formoterol 4.5 MICROgram(s) Inhaler 2 Puff(s) Inhalation two times a day  guaiFENesin    Syrup 100 milliGRAM(s) Oral every 6 hours PRN  tiotropium 18 MICROgram(s) Capsule 1 Capsule(s) Inhalation daily  docusate sodium 100 milliGRAM(s) Oral three times a day  pantoprazole    Tablet 40 milliGRAM(s) Oral before breakfast  polyethylene glycol 3350 17 Gram(s) Oral daily  senna 2 Tablet(s) Oral at bedtime  atorvastatin 10 milliGRAM(s) Oral at bedtime  finasteride 5 milliGRAM(s) Oral daily  chlorhexidine 2% Cloths 1 Application(s) Topical daily  cyanocobalamin 1000 MICROGram(s) Oral daily      REVIEW OF SYSTEMS:  See HPI, otherwise ROS negative.    PHYSICAL EXAM:  T(C): 36.7 (07-16-19 @ 07:20), Max: 37.1 (07-15-19 @ 22:00)  HR: 82 (07-16-19 @ 10:30) (68 - 968)  BP: 112/63 (07-16-19 @ 09:30) (82/57 - 115/66)  RR: 17 (07-16-19 @ 10:30) (14 - 30)  SpO2: 94% (07-16-19 @ 10:30) (86% - 98%)  Wt(kg): --  I&O's Summary    15 Jul 2019 07:01  -  16 Jul 2019 07:00  --------------------------------------------------------  IN: 450 mL / OUT: 1020 mL / NET: -570 mL    16 Jul 2019 07:01  -  16 Jul 2019 11:35  --------------------------------------------------------  IN: 120 mL / OUT: 250 mL / NET: -130 mL        Appearance: Alert. NAD	  Cardiovascular: +S1S2 RRR no m/g/r  ICD site (left pectoral): clean/dry/intact. No hematoma or wound compromise  Respiratory: CTA B/L	  Psychiatry: A & O x 3, Mood & affect appropriate  Neurologic: Non-focal  Extremities: No edema BLE  Vascular: Peripheral pulses palpable 2+ bilaterally      LABS:	 	    CBC Full  -  ( 16 Jul 2019 06:00 )  WBC Count : 11.4 K/uL  Hemoglobin : 13.4 g/dL  Hematocrit : 41.1 %  Platelet Count - Automated : 251 K/uL  Mean Cell Volume : 81.0 fl  Mean Cell Hemoglobin : 26.3 pg  Mean Cell Hemoglobin Concentration : 32.5 gm/dL  Auto Neutrophil # : 8.7 K/uL  Auto Lymphocyte # : 1.3 K/uL  Auto Monocyte # : 1.2 K/uL  Auto Eosinophil # : 0.2 K/uL  Auto Basophil # : 0.0 K/uL  Auto Neutrophil % : 76.6 %  Auto Lymphocyte % : 11.8 %  Auto Monocyte % : 10.2 %  Auto Eosinophil % : 1.4 %  Auto Basophil % : 0.0 %    07-16    132<L>  |  90<L>  |  18  ----------------------------<  99  3.8   |  28  |  0.89  07-15    132<L>  |  90<L>  |  16  ----------------------------<  105<H>  4.0   |  28  |  0.85    Ca    8.9      16 Jul 2019 06:00  Ca    9.0      15 Jul 2019 06:00  Phos  3.4     07-16  Phos  3.5     07-15  Mg     1.9     07-16  Mg     1.9     07-15    TELEMETRY: AF w/ occasional PVC's  	    ECG(7/16/19): QTc ~0.45 	    CXR: ok lead placement  	  ASSESSMENT/PLAN: 	  70y/o male h/o HTN, HLD, COPD, MORRO on CPAP/3L oxygen at home, recently diagnosed with AF on Eliquis/ Cardizem p/w syncope s/p Cardiac cath on 7/8- normal coronary arteries, had symptomatic sustained VT that spontaneously terminated s/p attempted VT ablation 7/12, started on sotalol s/p [employer] ICD 7/15  --[employer] rep check ICD this am with normal ICD function  --Reviewed post ICD instructions with pt & ID booklet given  --F/U in EP clinic on 7/29 @ 1:40pm  --QTc stable on Sotalol 160mg BID. Continue Sotalol  --Resume Eliquis tomorrow  --EP will sign off. Reconsult prn    DEB AlatorreC  [phone]

## 2019-07-16 NOTE — PROGRESS NOTE ADULT - ASSESSMENT
70 yo M w/ PMHx of COPD/MORRO on home O2, HTN, newly dx Afib who presented for Afib w/u on 7/5 s/p LHC 7/6 w/ clean coronaries whose hospital course was c/b multiple RRTs for Vtach transferred to CCU for Vtach w/u now s/p failed VT ablation on sotalol    # Neuro   - no active issues     # Respiratory  * COPD/MORRO  - SpO2 93% on 3L; baseline O2 requirement 3L  - c/w supplemental O2 to maintain SpO2 > 92%  - c/w Symbicort, Spiriva and PRN albuterol     # Cardiac  * Paroxysmal Afib  - c/w Sotalol   - CHADsVASC score 2 (age 69, CHF); Eliquis on hold 2/2 ICD placement yesterday; will resume on Wednesday     * Vtach   - s/p failed ablation  - currently on 160 BID Sotalol; daily EKGs to monitor QTc interval; AM dose was held.  Will check PM EKG and dose if QTc < 500ms  - s/p ICD placement 7/15   - monitor on tele  - EP following; appreciate recs     * Biventricular systolic HF  - s/p TTE 07/06 showing septal hypokineisis w/ LVEF 45%, moderate RV dilation w/ mildly reduced function   - c/w Lasix 40 qd  - c/w Sotalol   - no ACE/ARB in setting of soft BPs     # GI  - no active issues    # /electrolytes  * Hyponatremia  - asymptomatic   - stable; will continue to monitor     # Endo  - A1C 5.6%    # Heme  - improving leukocytosis   - Pt w/out fevers, no bands on diff   - continue to monitor     # Prophylactic measure  - DVT ppx: SCD's until Eliquis can be redosed on Wednesday   - Diet: DASH/TLC    Rebecca Lake MD  Internal Medicine, PGY-2   [phone] 68 yo M w/ PMHx of COPD/MORRO on home O2, HTN, newly dx Afib who presented for Afib w/u on 7/5 s/p C 7/6 w/ clean coronaries whose hospital course was c/b multiple RRTs for Vtach transferred to CCU for Vtach w/u now s/p failed VT ablation on sotalol    # Neuro   - no active issues     # Respiratory  * COPD/MORRO  - SpO2 93% on 3L; baseline O2 requirement 3L  - c/w supplemental O2 to maintain SpO2 > 92%  - c/w Symbicort, Spiriva and PRN albuterol     # Cardiac  * Paroxysmal Afib  - c/w Sotalol   - CHADsVASC score 2 (age 69, CHF); Eliquis on hold 2/2 ICD placement yesterday; will resume on Wednesday     * Vtach   - s/p failed ablation  - currently on 160 BID Sotalol; daily EKGs to monitor QTc interval; 7:30 AM EKG Qtc 451ms; dosed Sotaolol.    - continue to obtain EKGs prior to Sotalol dosing and hold if Qtc > 500ms   - s/p ICD placement 7/15   - monitor on tele  - EP following; appreciate recs     * Biventricular systolic HF  - s/p TTE 07/06 showing septal hypokineisis w/ LVEF 45%, moderate RV dilation w/ mildly reduced function   - c/w Lasix 40 qd  - c/w Sotalol   - no ACE/ARB in setting of soft BPs     # GI  - no active issues    # /electrolytes  * Hyponatremia  - asymptomatic   - stable; will continue to monitor     # Endo  - A1C 5.6%    # Heme  - improving leukocytosis   - Pt w/out fevers, no bands on diff   - continue to monitor     # Prophylactic measure  - DVT ppx: SCD's until Eliquis can be redosed on Wednesday   - Diet: DASH/TLC    Rebecca Lake MD  Internal Medicine, PGY-2   [phone] 70 yo M w/ PMHx of COPD/MORRO on home O2, HTN, newly dx Afib who presented for Afib w/u on 7/5 s/p C 7/6 w/ clean coronaries whose hospital course was c/b multiple RRTs for Vtach transferred to CCU for Vtach w/u now s/p failed VT ablation on sotalol    # Neuro   - no active issues     # Respiratory  * COPD/MORRO  - SpO2 93% on 3L; baseline O2 requirement 3L  - c/w supplemental O2 to maintain SpO2 > 92%  - c/w Symbicort, Spiriva and PRN albuterol     # Cardiac  * Paroxysmal Afib  - c/w Sotalol   - CHADsVASC score 2 (age 69, CHF); Eliquis on hold 2/2 ICD placement yesterday; will resume on Wednesday     * Vtach   - s/p failed ablation  - currently on 160 BID Sotalol; daily EKGs to monitor QTc interval; 7:30 AM EKG Qtc 451ms; dosed Sotaolol.    - continue to obtain EKGs prior to Sotalol dosing and hold if Qtc > 500ms   - s/p ICD placement 7/15   - monitor on tele  - EP following; appreciate recs     * Biventricular systolic HF  - s/p TTE 07/06 showing septal hypokineisis w/ LVEF 45%, moderate RV dilation w/ mildly reduced function   - c/w Lasix 40 qd  - c/w Sotalol   - no ACE/ARB in setting of soft BPs     # GI  - no active issues    # /electrolytes  * Hyponatremia  - asymptomatic   - stable; will continue to monitor     # Endo  - A1C 5.6%    # Heme  - improving leukocytosis   - Pt w/out fevers, no bands on diff   - continue to monitor     # Prophylactic measure  - DVT ppx: SCD's until Eliquis can be redosed on Wednesday   - Diet: DASH/TLC  - Dispo: home w/ home PT    Rebecca Lake MD  Internal Medicine, PGY-2   [phone]

## 2019-07-16 NOTE — PROGRESS NOTE ADULT - SUBJECTIVE AND OBJECTIVE BOX
PATIENT:  ALICE JUNIOR  [id number]    CHIEF COMPLAINT:  Patient is a 69y old  Male who presents with a chief complaint of syncopal episode (15 Jul 2019 15:15)      INTERVAL HISTORYOVERNIGHT EVENTS:  AM Sotalol dose was held 2/ Qtc 547ms       REVIEW OF SYSTEMS:    CONSTITUTIONAL: No weakness, fevers or chills  EYES/ENT: No visual changes;  No vertigo or throat pain   NECK: No pain or stiffness  RESPIRATORY: No cough, wheezing, hemoptysis; No shortness of breath  CARDIOVASCULAR: No chest pain or palpitations  GASTROINTESTINAL: No abdominal or epigastric pain. No nausea, vomiting, or hematemesis; No diarrhea or constipation. No melena or hematochezia.  GENITOURINARY: No dysuria, frequency or hematuria  NEUROLOGICAL: No numbness or weakness  SKIN: No itching, rashes      MEDICATIONS:  MEDICATIONS  (STANDING):  atorvastatin 10 milliGRAM(s) Oral at bedtime  buDESOnide 160 MICROgram(s)/formoterol 4.5 MICROgram(s) Inhaler 2 Puff(s) Inhalation two times a day  chlorhexidine 2% Cloths 1 Application(s) Topical daily  cyanocobalamin 1000 MICROGram(s) Oral daily  docusate sodium 100 milliGRAM(s) Oral three times a day  finasteride 5 milliGRAM(s) Oral daily  furosemide    Tablet 40 milliGRAM(s) Oral daily  pantoprazole    Tablet 40 milliGRAM(s) Oral before breakfast  polyethylene glycol 3350 17 Gram(s) Oral daily  senna 2 Tablet(s) Oral at bedtime  sotalol 160 milliGRAM(s) Oral every 12 hours  tamsulosin 0.8 milliGRAM(s) Oral at bedtime  tiotropium 18 MICROgram(s) Capsule 1 Capsule(s) Inhalation daily    MEDICATIONS  (PRN):  ALBUTerol    90 MICROgram(s) HFA Inhaler 2 Puff(s) Inhalation every 6 hours PRN Shortness of Breath and/or Wheezing  benzonatate 100 milliGRAM(s) Oral three times a day PRN Cough  guaiFENesin    Syrup 100 milliGRAM(s) Oral every 6 hours PRN Cough      ALLERGIES:  Allergies    No Known Allergies    Intolerances        OBJECTIVE:  ICU Vital Signs Last 24 Hrs  T(C): 36.7 (2019 06:00), Max: 37.1 (15 Jul 2019 22:00)  T(F): 98 (2019 06:00), Max: 98.7 (15 Jul 2019 22:00)  HR: 102 (2019 06:57) (68 - 968)  BP: 109/63 (2019 06:00) (81/59 - 118/72)  BP(mean): 87 (2019 06:00) (62 - 91)  ABP: --  ABP(mean): --  RR: 25 (2019 06:00) (14 - 30)  SpO2: 92% (2019 06:57) (86% - 98%)      Adult Advanced Hemodynamics Last 24 Hrs  CVP(mm Hg): --  CVP(cm H2O): --  CO: --  CI: --  PA: --  PA(mean): --  PCWP: --  SVR: --  SVRI: --  PVR: --  PVRI: --  CAPILLARY BLOOD GLUCOSE        CAPILLARY BLOOD GLUCOSE        I&O's Summary    15 Jul 2019 07:01  -  2019 07:00  --------------------------------------------------------  IN: 350 mL / OUT: 1020 mL / NET: -670 mL      Daily     Daily Weight in k.7 (15 Jul 2019 14:20)    PHYSICAL EXAMINATION:  General: WN/WD NAD  HEENT: PERRLA, EOMI, moist mucous membranes  Neurology: A&Ox3, nonfocal, MATHIS x 4  Respiratory: CTA B/L, normal respiratory effort, no wheezes, crackles, rales  CV: RRR, S1S2, no murmurs, rubs or gallops  Abdominal: Soft, NT, ND +BS, Last BM  Extremities: No edema, + peripheral pulses  Incisions:   Tubes:    LABS:                          13.4   11.4  )-----------( 251      ( 2019 06:00 )             41.1     07-16    132<L>  |  90<L>  |  18  ----------------------------<  99  3.8   |  28  |  0.89    Ca    8.9      2019 06:00  Phos  3.4     07-  Mg     1.9                         TELEMETRY:     EKG:     IMAGING: PATIENT:  ALICE JUNIOR  [id number]    CHIEF COMPLAINT:  Patient is a 69y old  Male who presents with a chief complaint of syncopal episode (15 Jul 2019 15:15)      INTERVAL HISTORYOVERNIGHT EVENTS:  6 AM Sotalol dose was held 2/2 Qtc 547ms; 7:30 AM Qtc 451ms - dosed Sotalol.        REVIEW OF SYSTEMS:    CONSTITUTIONAL: No weakness, fevers or chills  EYES/ENT: No visual changes;  No vertigo or throat pain   NECK: No pain or stiffness  RESPIRATORY: No cough, wheezing, hemoptysis; No shortness of breath  CARDIOVASCULAR: No chest pain or palpitations  GASTROINTESTINAL: No abdominal or epigastric pain. No nausea, vomiting, or hematemesis; No diarrhea or constipation. No melena or hematochezia.  GENITOURINARY: No dysuria, frequency or hematuria  NEUROLOGICAL: No numbness or weakness  SKIN: No itching, rashes      MEDICATIONS:  MEDICATIONS  (STANDING):  atorvastatin 10 milliGRAM(s) Oral at bedtime  buDESOnide 160 MICROgram(s)/formoterol 4.5 MICROgram(s) Inhaler 2 Puff(s) Inhalation two times a day  chlorhexidine 2% Cloths 1 Application(s) Topical daily  cyanocobalamin 1000 MICROGram(s) Oral daily  docusate sodium 100 milliGRAM(s) Oral three times a day  finasteride 5 milliGRAM(s) Oral daily  furosemide    Tablet 40 milliGRAM(s) Oral daily  pantoprazole    Tablet 40 milliGRAM(s) Oral before breakfast  polyethylene glycol 3350 17 Gram(s) Oral daily  senna 2 Tablet(s) Oral at bedtime  sotalol 160 milliGRAM(s) Oral every 12 hours  tamsulosin 0.8 milliGRAM(s) Oral at bedtime  tiotropium 18 MICROgram(s) Capsule 1 Capsule(s) Inhalation daily    MEDICATIONS  (PRN):  ALBUTerol    90 MICROgram(s) HFA Inhaler 2 Puff(s) Inhalation every 6 hours PRN Shortness of Breath and/or Wheezing  benzonatate 100 milliGRAM(s) Oral three times a day PRN Cough  guaiFENesin    Syrup 100 milliGRAM(s) Oral every 6 hours PRN Cough      ALLERGIES:  Allergies    No Known Allergies    Intolerances        OBJECTIVE:  ICU Vital Signs Last 24 Hrs  T(C): 36.7 (2019 06:00), Max: 37.1 (15 Jul 2019 22:00)  T(F): 98 (2019 06:00), Max: 98.7 (15 Jul 2019 22:00)  HR: 102 (2019 06:57) (68 - 968)  BP: 109/63 (2019 06:00) (81/59 - 118/72)  BP(mean): 87 (2019 06:00) (62 - 91)  ABP: --  ABP(mean): --  RR: 25 (2019 06:00) (14 - 30)  SpO2: 92% (2019 06:57) (86% - 98%)      Adult Advanced Hemodynamics Last 24 Hrs  CVP(mm Hg): --  CVP(cm H2O): --  CO: --  CI: --  PA: --  PA(mean): --  PCWP: --  SVR: --  SVRI: --  PVR: --  PVRI: --  CAPILLARY BLOOD GLUCOSE        CAPILLARY BLOOD GLUCOSE        I&O's Summary    15 Jul 2019 07:01  -  2019 07:00  --------------------------------------------------------  IN: 350 mL / OUT: 1020 mL / NET: -670 mL      Daily     Daily Weight in k.7 (15 Jul 2019 14:20)    PHYSICAL EXAMINATION:  General: WN/WD NAD  HEENT: PERRLA, EOMI, moist mucous membranes  Neurology: A&Ox3, nonfocal, MATHIS x 4  Respiratory: CTA B/L, normal respiratory effort, no wheezes, crackles, rales  CV: RRR, S1S2, no murmurs, rubs or gallops  Abdominal: Soft, NT, ND +BS, Last BM  Extremities: No edema, + peripheral pulses  Incisions:   Tubes:    LABS:                          13.4   11.4  )-----------( 251      ( 2019 06:00 )             41.1     07-16    132<L>  |  90<L>  |  18  ----------------------------<  99  3.8   |  28  |  0.89    Ca    8.9      2019 06:00  Phos  3.4     07-16  Mg     1.9     07-16                    TELEMETRY:     EKG:     IMAGING: PATIENT:  ALICE JUNIOR  [id number]    CHIEF COMPLAINT:  Patient is a 69y old  Male who presents with a chief complaint of syncopal episode (15 Jul 2019 15:15)      INTERVAL HISTORYOVERNIGHT EVENTS:  6 AM Sotalol dose was held 2/2 Qtc 547ms; 7:30 AM Qtc 451ms - dosed Sotalol.        REVIEW OF SYSTEMS:    CONSTITUTIONAL: No weakness, fevers or chills  EYES/ENT: No visual changes;  No vertigo or throat pain   NECK: No pain or stiffness  RESPIRATORY: No cough, wheezing, hemoptysis; No shortness of breath  CARDIOVASCULAR: No chest pain or palpitations  GASTROINTESTINAL: No abdominal or epigastric pain. No nausea, vomiting, or hematemesis; No diarrhea or constipation. No melena or hematochezia.  GENITOURINARY: No dysuria, frequency or hematuria  NEUROLOGICAL: No numbness or weakness  SKIN: No itching, rashes      MEDICATIONS:  MEDICATIONS  (STANDING):  atorvastatin 10 milliGRAM(s) Oral at bedtime  buDESOnide 160 MICROgram(s)/formoterol 4.5 MICROgram(s) Inhaler 2 Puff(s) Inhalation two times a day  chlorhexidine 2% Cloths 1 Application(s) Topical daily  cyanocobalamin 1000 MICROGram(s) Oral daily  docusate sodium 100 milliGRAM(s) Oral three times a day  finasteride 5 milliGRAM(s) Oral daily  furosemide    Tablet 40 milliGRAM(s) Oral daily  pantoprazole    Tablet 40 milliGRAM(s) Oral before breakfast  polyethylene glycol 3350 17 Gram(s) Oral daily  senna 2 Tablet(s) Oral at bedtime  sotalol 160 milliGRAM(s) Oral every 12 hours  tamsulosin 0.8 milliGRAM(s) Oral at bedtime  tiotropium 18 MICROgram(s) Capsule 1 Capsule(s) Inhalation daily    MEDICATIONS  (PRN):  ALBUTerol    90 MICROgram(s) HFA Inhaler 2 Puff(s) Inhalation every 6 hours PRN Shortness of Breath and/or Wheezing  benzonatate 100 milliGRAM(s) Oral three times a day PRN Cough  guaiFENesin    Syrup 100 milliGRAM(s) Oral every 6 hours PRN Cough      ALLERGIES:  Allergies    No Known Allergies    Intolerances        OBJECTIVE:  ICU Vital Signs Last 24 Hrs  T(C): 36.7 (2019 06:00), Max: 37.1 (15 Jul 2019 22:00)  T(F): 98 (2019 06:00), Max: 98.7 (15 Jul 2019 22:00)  HR: 102 (2019 06:57) (68 - 968)  BP: 109/63 (2019 06:00) (81/59 - 118/72)  BP(mean): 87 (2019 06:00) (62 - 91)  ABP: --  ABP(mean): --  RR: 25 (2019 06:00) (14 - 30)  SpO2: 92% (2019 06:57) (86% - 98%)      Adult Advanced Hemodynamics Last 24 Hrs  CVP(mm Hg): --  CVP(cm H2O): --  CO: --  CI: --  PA: --  PA(mean): --  PCWP: --  SVR: --  SVRI: --  PVR: --  PVRI: --  CAPILLARY BLOOD GLUCOSE        CAPILLARY BLOOD GLUCOSE        I&O's Summary    15 Jul 2019 07:01  -  2019 07:00  --------------------------------------------------------  IN: 350 mL / OUT: 1020 mL / NET: -670 mL      Daily     Daily Weight in k.7 (15 Jul 2019 14:20)    PHYSICAL EXAMINATION:  General: WN/WD NAD  HEENT: PERRLA, EOMI, moist mucous membranes  Neurology: A&Ox3, nonfocal, MATHIS x 4  Respiratory: CTA B/L, normal respiratory effort, no wheezes, crackles, rales  CV: RRR, S1S2, no murmurs, rubs or gallops  Abdominal: Soft, NT, ND +BS, Last BM  Extremities: No edema, + peripheral pulses  Incisions: ICD dressing c/d/i    Tubes:    LABS:                          13.4   11.4  )-----------( 251      ( 2019 06:00 )             41.1     07-16    132<L>  |  90<L>  |  18  ----------------------------<  99  3.8   |  28  |  0.89    Ca    8.9      2019 06:00  Phos  3.4     07-16  Mg     1.9     07-16                    TELEMETRY: PVCs     EKG: QTc 451    IMAGING:  CXR showed that ICD in good position

## 2019-07-16 NOTE — CONSULT NOTE ADULT - ASSESSMENT
Psychological assessment: Expressed readiness to leave the hospital. Endorsed low mood in context of hospitalization; noted "but I talk myself out of it." Noted that humor is his preferred coping mechanism. Categorically denied feeling anxious, but endorsed anxiety related to episode of weakness in legs prior to being admitted. Also noted that experience of hospitalization has motivated him to become more active and take better care of himself. Described spending most of his time in recliner in bedroom in recent months because of difficulty maneuvering through apartment with walker and oxygen. Writer provided education about impact of inactivity on mood. Patient expressed desire to be more active. Denies S/I. Open to support and validation.    Mental Status Exam: Seen lying in bed. A&Ox3. Pleasant and cooperative.  Speech normal range and volume. Mood "excited to get out of here." Affect jovial, somewhat guarded. Thought process generally goal directed.  Thought content without evidence of any psychosis, grace, or delusions.  Insight and judgment adequate.     Impression: Ms. Mchugh is a 69-year-old male with newly diagnosed Afib started on sotalol s/p ICD placement 7/15, following failed VT ablation. In general is quite guarded with emotions; expressed preference to make jokes rather than be serious. Reluctantly endorses low mood in context of hospitalization. Categorically denied being anxious, but endorsed anxiety related to episode of weakness in legs. Appears to be motivated to engage in increased physical activity, and stated several goals: walk up several stairs (short term), [place]). Lives a sedentary lifestyle where he spends much of his time in a recliner in bedroom.     Dx: Adjustment disorder with mixed anxiety and depressed mood, F43.23      Recommendations:  ·	Behavioral cardiology will continue to provide support as needed Psychological assessment: Expressed readiness to leave the hospital. Endorsed low mood in context of hospitalization; noted "but I talk myself out of it." Noted that humor is his preferred coping mechanism. Categorically denied feeling anxious, but endorsed anxiety related to episode of weakness in legs prior to being admitted. Also noted that experience of hospitalization has motivated him to become more active and take better care of himself, but could not provide specific examples of how he might do that. Described spending most of his time in recliner in bedroom in recent months because of difficulty maneuvering through apartment with walker and oxygen. Writer provided education about impact of inactivity on mood. Patient expressed desire to be more active. Denies S/I. Open to support and validation.    Mental Status Exam: Seen lying in bed. A&Ox3. Pleasant and cooperative.  Speech normal range and volume. Mood "excited to get out of here." Affect jovial, somewhat guarded. Thought process generally goal directed.  Thought content without evidence of any psychosis, grace, or delusions.  Insight and judgment adequate.     Impression: Ms. Mchugh is a 69-year-old male with newly diagnosed Afib started on sotalol s/p ICD placement 7/15, following failed VT ablation. In general is quite guarded with emotions; expressed preference to make jokes rather than be serious. Reluctantly endorses low mood in context of hospitalization. Categorically denied being anxious, but endorsed anxiety related to episode of weakness in legs. Appears to be motivated to engage in increased physical activity, and stated several goals: walk up several stairs (short term), [place] (long [place]). Lives a sedentary lifestyle where he spends much of his time in a recliner in bedroom.     Dx: Adjustment disorder with mixed anxiety and depressed mood, F43.23      Recommendations:  ·	Behavioral cardiology will continue to provide support as needed

## 2019-07-16 NOTE — PROGRESS NOTE ADULT - ATTENDING COMMENTS
Patient is seen and examined with fellow, NP and the CCU house-staff. I agree with the history, physical and the assessment and plan.    on sotalol with recurrent VT  f/u with epS  plan for ICD this week
Patient seen and examined. Agree with assessment and plan as outlined above. Patient with no obstructive CAD on cath. He did have sustained VT today. Patient denies symptoms during the episode. Up titrate beta blocker as BP allows.  Optimize lytes. EP evaluation.
Patient seen and examined. Agree with assessment and plan as outlined above. EP to see patient for VT. No CAD. Amio started
Plan for left and right heart cath tomorrow  Continue heparin gtt.    Thanks    Liam Mitchell [id number]
Patient is seen and examined with fellow, NP and the CCU house-staff. I agree with the history, physical and the assessment and plan.   s/p ACD  on sotalol   montor qTc
Patient is seen and examined with fellow, NP and the CCU house-staff. I agree with the history, physical and the assessment and plan.  s/p ICD  monitor qtc on sotalol
Patient was seen and examined with CCU team. I agree with findings and plan.
Patient is seen and examined with fellow, NP and the CCU house-staff. I agree with the history, physical and the assessment and plan.  awaiting VT ablation
Patient is seen and examined with fellow, NP and the CCU house-staff. I agree with the history, physical and the assessment and plan.  unsuccessful VT ablation  on sotalol - monitor qt - last one 410 msec  will start eliquis  plan for ICD
Patient is seen and examined with fellow, NP and the CCU house-staff. I agree with the history, physical and the assessment and plan.  VT storm on lidocain drip  keep negative  will start amiodaorne if continues to have more VT

## 2019-07-17 ENCOUNTER — TRANSCRIPTION ENCOUNTER (OUTPATIENT)
Age: 69
End: 2019-07-17

## 2019-07-17 VITALS
OXYGEN SATURATION: 94 % | RESPIRATION RATE: 19 BRPM | HEART RATE: 82 BPM | SYSTOLIC BLOOD PRESSURE: 122 MMHG | DIASTOLIC BLOOD PRESSURE: 72 MMHG

## 2019-07-17 LAB
ANION GAP SERPL CALC-SCNC: 16 MMOL/L — SIGNIFICANT CHANGE UP (ref 5–17)
APTT BLD: 32.4 SEC — SIGNIFICANT CHANGE UP (ref 27.5–36.3)
BASOPHILS # BLD AUTO: 0 K/UL — SIGNIFICANT CHANGE UP (ref 0–0.2)
BASOPHILS NFR BLD AUTO: 0.2 % — SIGNIFICANT CHANGE UP (ref 0–2)
BUN SERPL-MCNC: 23 MG/DL — SIGNIFICANT CHANGE UP (ref 7–23)
CALCIUM SERPL-MCNC: 8.7 MG/DL — SIGNIFICANT CHANGE UP (ref 8.4–10.5)
CHLORIDE SERPL-SCNC: 90 MMOL/L — LOW (ref 96–108)
CO2 SERPL-SCNC: 30 MMOL/L — SIGNIFICANT CHANGE UP (ref 22–31)
CREAT SERPL-MCNC: 1 MG/DL — SIGNIFICANT CHANGE UP (ref 0.5–1.3)
EOSINOPHIL # BLD AUTO: 0.2 K/UL — SIGNIFICANT CHANGE UP (ref 0–0.5)
EOSINOPHIL NFR BLD AUTO: 1.8 % — SIGNIFICANT CHANGE UP (ref 0–6)
GLUCOSE SERPL-MCNC: 111 MG/DL — HIGH (ref 70–99)
HCT VFR BLD CALC: 38.9 % — LOW (ref 39–50)
HGB BLD-MCNC: 12.6 G/DL — LOW (ref 13–17)
INR BLD: 1.38 RATIO — HIGH (ref 0.88–1.16)
LYMPHOCYTES # BLD AUTO: 1.9 K/UL — SIGNIFICANT CHANGE UP (ref 1–3.3)
LYMPHOCYTES # BLD AUTO: 17.6 % — SIGNIFICANT CHANGE UP (ref 13–44)
MAGNESIUM SERPL-MCNC: 2 MG/DL — SIGNIFICANT CHANGE UP (ref 1.6–2.6)
MCHC RBC-ENTMCNC: 26 PG — LOW (ref 27–34)
MCHC RBC-ENTMCNC: 32.2 GM/DL — SIGNIFICANT CHANGE UP (ref 32–36)
MCV RBC AUTO: 80.8 FL — SIGNIFICANT CHANGE UP (ref 80–100)
MONOCYTES # BLD AUTO: 1.4 K/UL — HIGH (ref 0–0.9)
MONOCYTES NFR BLD AUTO: 12.8 % — SIGNIFICANT CHANGE UP (ref 2–14)
NEUTROPHILS # BLD AUTO: 7.2 K/UL — SIGNIFICANT CHANGE UP (ref 1.8–7.4)
NEUTROPHILS NFR BLD AUTO: 67.6 % — SIGNIFICANT CHANGE UP (ref 43–77)
PHOSPHATE SERPL-MCNC: 3.4 MG/DL — SIGNIFICANT CHANGE UP (ref 2.5–4.5)
PLATELET # BLD AUTO: 268 K/UL — SIGNIFICANT CHANGE UP (ref 150–400)
POTASSIUM SERPL-MCNC: 3.7 MMOL/L — SIGNIFICANT CHANGE UP (ref 3.5–5.3)
POTASSIUM SERPL-SCNC: 3.7 MMOL/L — SIGNIFICANT CHANGE UP (ref 3.5–5.3)
PROTHROM AB SERPL-ACNC: 16 SEC — HIGH (ref 10–12.9)
RBC # BLD: 4.82 M/UL — SIGNIFICANT CHANGE UP (ref 4.2–5.8)
RBC # FLD: 13.7 % — SIGNIFICANT CHANGE UP (ref 10.3–14.5)
SODIUM SERPL-SCNC: 136 MMOL/L — SIGNIFICANT CHANGE UP (ref 135–145)
WBC # BLD: 10.7 K/UL — HIGH (ref 3.8–10.5)
WBC # FLD AUTO: 10.7 K/UL — HIGH (ref 3.8–10.5)

## 2019-07-17 PROCEDURE — 99239 HOSP IP/OBS DSCHRG MGMT >30: CPT

## 2019-07-17 PROCEDURE — 93010 ELECTROCARDIOGRAM REPORT: CPT

## 2019-07-17 RX ORDER — DOCUSATE SODIUM 100 MG
1 CAPSULE ORAL
Qty: 0 | Refills: 0 | DISCHARGE
Start: 2019-07-17

## 2019-07-17 RX ORDER — POTASSIUM CHLORIDE 20 MEQ
40 PACKET (EA) ORAL ONCE
Refills: 0 | Status: COMPLETED | OUTPATIENT
Start: 2019-07-17 | End: 2019-07-17

## 2019-07-17 RX ORDER — POLYETHYLENE GLYCOL 3350 17 G/17G
17 POWDER, FOR SOLUTION ORAL
Qty: 0 | Refills: 0 | DISCHARGE
Start: 2019-07-17

## 2019-07-17 RX ORDER — SENNA PLUS 8.6 MG/1
2 TABLET ORAL
Qty: 0 | Refills: 0 | DISCHARGE
Start: 2019-07-17

## 2019-07-17 RX ORDER — ALFUZOSIN HYDROCHLORIDE 10 MG/1
1 TABLET, EXTENDED RELEASE ORAL
Qty: 0 | Refills: 0 | DISCHARGE

## 2019-07-17 RX ORDER — TAMSULOSIN HYDROCHLORIDE 0.4 MG/1
2 CAPSULE ORAL
Qty: 0 | Refills: 0 | DISCHARGE
Start: 2019-07-17

## 2019-07-17 RX ADMIN — Medication 40 MILLIEQUIVALENT(S): at 05:10

## 2019-07-17 RX ADMIN — FINASTERIDE 5 MILLIGRAM(S): 5 TABLET, FILM COATED ORAL at 12:16

## 2019-07-17 RX ADMIN — TIOTROPIUM BROMIDE 1 CAPSULE(S): 18 CAPSULE ORAL; RESPIRATORY (INHALATION) at 12:34

## 2019-07-17 RX ADMIN — Medication 160 MILLIGRAM(S): at 05:32

## 2019-07-17 RX ADMIN — PANTOPRAZOLE SODIUM 40 MILLIGRAM(S): 20 TABLET, DELAYED RELEASE ORAL at 05:09

## 2019-07-17 RX ADMIN — Medication 100 MILLIGRAM(S): at 05:09

## 2019-07-17 RX ADMIN — APIXABAN 5 MILLIGRAM(S): 2.5 TABLET, FILM COATED ORAL at 05:08

## 2019-07-17 RX ADMIN — BUDESONIDE AND FORMOTEROL FUMARATE DIHYDRATE 2 PUFF(S): 160; 4.5 AEROSOL RESPIRATORY (INHALATION) at 05:42

## 2019-07-17 RX ADMIN — PREGABALIN 1000 MICROGRAM(S): 225 CAPSULE ORAL at 12:16

## 2019-07-17 RX ADMIN — Medication 40 MILLIGRAM(S): at 05:08

## 2019-07-17 NOTE — DISCHARGE NOTE NURSING/CASE MANAGEMENT/SOCIAL WORK - NSDCDPATPORTLINK_GEN_ALL_CORE
You can access the [facility] Patient Portal, offered by [facility], by registering with the following website: [email or web address]

## 2019-07-17 NOTE — PROGRESS NOTE ADULT - PROVIDER SPECIALTY LIST ADULT
CCU
Cardiology
Electrophysiology
Internal Medicine
Internal Medicine
Electrophysiology
CCU
CCU
Internal Medicine
Internal Medicine

## 2019-07-17 NOTE — PROGRESS NOTE ADULT - REASON FOR ADMISSION
syncopal episode
VT
syncopal episode

## 2019-07-17 NOTE — DISCHARGE NOTE NURSING/CASE MANAGEMENT/SOCIAL WORK - NSDPFAC_GEN_ALL_CORE
[facility] (address: [address] [place] [address], phone: [phone])

## 2019-07-17 NOTE — PROGRESS NOTE ADULT - ASSESSMENT
70 yo M w/ PMHx of COPD/MORRO on home O2, HTN, newly dx Afib who presented for Afib w/u on 7/5 s/p C 7/6 w/ clean coronaries whose hospital course was c/b multiple RRTs for Vtach transferred to CCU for Vtach w/u now s/p failed VT ablation on sotalol    # Neuro   - no active issues     # Respiratory  * COPD/MORRO  - SpO2 93% on 3L; baseline O2 requirement 3L  - c/w supplemental O2 to maintain SpO2 > 92%  - c/w Symbicort, Spiriva and PRN albuterol     # Cardiac  * Paroxysmal Afib  - c/w Sotalol   - CHADsVASC score 2 (age 69, CHF); Eliquis on hold 2/2 ICD placement yesterday; will resume on Wednesday     * Vtach   - s/p failed ablation  - currently on 160 BID Sotalol; daily EKGs to monitor QTc interval; 7:30 AM EKG Qtc 451ms; dosed Sotaolol.    - continue to obtain EKGs prior to Sotalol dosing and hold if Qtc > 500ms   - s/p ICD placement 7/15   - monitor on tele  - EP following; appreciate recs     * Biventricular systolic HF  - s/p TTE 07/06 showing septal hypokineisis w/ LVEF 45%, moderate RV dilation w/ mildly reduced function   - c/w Lasix 40 qd  - c/w Sotalol   - no ACE/ARB in setting of soft BPs     # GI  - no active issues    # /electrolytes  * Hyponatremia  - asymptomatic   - stable; will continue to monitor     # Endo  - A1C 5.6%    # Heme  - improving leukocytosis   - Pt w/out fevers, no bands on diff   - continue to monitor     # Prophylactic measure  - DVT ppx: SCD's until Eliquis can be redosed on Wednesday   - Diet: DASH/TLC  - Dispo: home w/ home PT    Rebecca Lake MD  Internal Medicine, PGY-2   [phone]

## 2019-07-17 NOTE — PROGRESS NOTE ADULT - SUBJECTIVE AND OBJECTIVE BOX
PATIENT:  ALICE JUNIOR  [id number]    CHIEF COMPLAINT:  Patient is a 69y old  Male who presents with a chief complaint of syncopal episode (2019 17:08)      INTERVAL HISTORY/OVERNIGHT EVENTS:  No events overnight.        REVIEW OF SYSTEMS:    CONSTITUTIONAL: No weakness, fevers or chills  EYES/ENT: No visual changes;  No vertigo or throat pain   NECK: No pain or stiffness  RESPIRATORY: No cough, wheezing, hemoptysis; No shortness of breath  CARDIOVASCULAR: No chest pain or palpitations  GASTROINTESTINAL: No abdominal or epigastric pain. No nausea, vomiting, or hematemesis; No diarrhea or constipation. No melena or hematochezia.  GENITOURINARY: No dysuria, frequency or hematuria  NEUROLOGICAL: No numbness or weakness  SKIN: No itching, rashes      MEDICATIONS:  MEDICATIONS  (STANDING):  apixaban 5 milliGRAM(s) Oral every 12 hours  atorvastatin 10 milliGRAM(s) Oral at bedtime  buDESOnide 160 MICROgram(s)/formoterol 4.5 MICROgram(s) Inhaler 2 Puff(s) Inhalation two times a day  chlorhexidine 2% Cloths 1 Application(s) Topical daily  cyanocobalamin 1000 MICROGram(s) Oral daily  docusate sodium 100 milliGRAM(s) Oral three times a day  finasteride 5 milliGRAM(s) Oral daily  furosemide    Tablet 40 milliGRAM(s) Oral daily  pantoprazole    Tablet 40 milliGRAM(s) Oral before breakfast  polyethylene glycol 3350 17 Gram(s) Oral daily  senna 2 Tablet(s) Oral at bedtime  sotalol 160 milliGRAM(s) Oral every 12 hours  tamsulosin 0.8 milliGRAM(s) Oral at bedtime  tiotropium 18 MICROgram(s) Capsule 1 Capsule(s) Inhalation daily    MEDICATIONS  (PRN):  ALBUTerol    90 MICROgram(s) HFA Inhaler 2 Puff(s) Inhalation every 6 hours PRN Shortness of Breath and/or Wheezing  benzonatate 100 milliGRAM(s) Oral three times a day PRN Cough  guaiFENesin    Syrup 100 milliGRAM(s) Oral every 6 hours PRN Cough      ALLERGIES:  Allergies    No Known Allergies    Intolerances        OBJECTIVE:  ICU Vital Signs Last 24 Hrs  T(C): 36.4 (2019 03:00), Max: 37 (2019 20:00)  T(F): 97.6 (2019 03:00), Max: 98.6 (2019 20:00)  HR: 66 (2019 07:00) (66 - 112)  BP: 103/68 (2019 07:00) (82/63 - 116/68)  BP(mean): 85 (2019 07:00) (53 - 96)  ABP: --  ABP(mean): --  RR: 20 (2019 07:00) (13 - 35)  SpO2: 96% (2019 07:00) (92% - 96%)      Adult Advanced Hemodynamics Last 24 Hrs  CVP(mm Hg): --  CVP(cm H2O): --  CO: --  CI: --  PA: --  PA(mean): --  PCWP: --  SVR: --  SVRI: --  PVR: --  PVRI: --  CAPILLARY BLOOD GLUCOSE        CAPILLARY BLOOD GLUCOSE        I&O's Summary    2019 07:01  -  2019 07:00  --------------------------------------------------------  IN: 900 mL / OUT: 1400 mL / NET: -500 mL      Daily     Daily Weight in k (2019 03:00)    PHYSICAL EXAMINATION:  General: WN/WD NAD  HEENT: PERRLA, EOMI, moist mucous membranes  Neurology: A&Ox3, nonfocal, MATHIS x 4  Respiratory: CTA B/L, normal respiratory effort, no wheezes, crackles, rales  CV: RRR, S1S2, no murmurs, rubs or gallops  Abdominal: Soft, NT, ND +BS, Last BM  Extremities: No edema, + peripheral pulses  Incisions:   Tubes:    LABS:                          12.6   10.7  )-----------( 268      ( 2019 02:47 )             38.9     07-17    136  |  90<L>  |  23  ----------------------------<  111<H>  3.7   |  30  |  1.00    Ca    8.7      2019 02:47  Phos  3.4     07-17  Mg     2.0     07-17        PT/INR - ( 2019 02:47 )   PT: 16.0 sec;   INR: 1.38 ratio         PTT - ( 2019 02:47 )  PTT:32.4 sec            TELEMETRY:     EKG:     IMAGING:

## 2019-07-18 ENCOUNTER — MEDICATION RENEWAL (OUTPATIENT)
Age: 69
End: 2019-07-18

## 2019-07-29 ENCOUNTER — APPOINTMENT (OUTPATIENT)
Dept: ELECTROPHYSIOLOGY | Facility: CLINIC | Age: 69
End: 2019-07-29

## 2019-08-02 ENCOUNTER — RX RENEWAL (OUTPATIENT)
Age: 69
End: 2019-08-02

## 2019-08-06 ENCOUNTER — APPOINTMENT (OUTPATIENT)
Dept: INTERNAL MEDICINE | Facility: CLINIC | Age: 69
End: 2019-08-06

## 2019-08-09 ENCOUNTER — INPATIENT (INPATIENT)
Facility: HOSPITAL | Age: 69
LOS: 6 days | Discharge: NURSING, REHAB OR LONG-TERM CARE | DRG: 308 | End: 2019-08-16
Attending: STUDENT IN AN ORGANIZED HEALTH CARE EDUCATION/TRAINING PROGRAM | Admitting: INTERNAL MEDICINE
Payer: MEDICARE

## 2019-08-09 VITALS
RESPIRATION RATE: 18 BRPM | TEMPERATURE: 98 F | OXYGEN SATURATION: 87 % | SYSTOLIC BLOOD PRESSURE: 110 MMHG | HEART RATE: 83 BPM | DIASTOLIC BLOOD PRESSURE: 73 MMHG

## 2019-08-09 DIAGNOSIS — I47.2 VENTRICULAR TACHYCARDIA: ICD-10-CM

## 2019-08-09 LAB
ALBUMIN SERPL ELPH-MCNC: 3 G/DL — LOW (ref 3.3–5)
ALP SERPL-CCNC: 140 U/L — HIGH (ref 40–120)
ALT FLD-CCNC: 16 U/L — SIGNIFICANT CHANGE UP (ref 10–45)
ANION GAP SERPL CALC-SCNC: 14 MMOL/L — SIGNIFICANT CHANGE UP (ref 5–17)
APPEARANCE UR: ABNORMAL
APTT BLD: 34.3 SEC — SIGNIFICANT CHANGE UP (ref 27.5–36.3)
AST SERPL-CCNC: 21 U/L — SIGNIFICANT CHANGE UP (ref 10–40)
BACTERIA # UR AUTO: NEGATIVE — SIGNIFICANT CHANGE UP
BASE EXCESS BLDV CALC-SCNC: 9.6 MMOL/L — HIGH (ref -2–2)
BILIRUB SERPL-MCNC: 0.5 MG/DL — SIGNIFICANT CHANGE UP (ref 0.2–1.2)
BILIRUB UR-MCNC: ABNORMAL
BUN SERPL-MCNC: 17 MG/DL — SIGNIFICANT CHANGE UP (ref 7–23)
CA-I SERPL-SCNC: 1.08 MMOL/L — LOW (ref 1.12–1.3)
CALCIUM SERPL-MCNC: 8.8 MG/DL — SIGNIFICANT CHANGE UP (ref 8.4–10.5)
CHLORIDE BLDV-SCNC: 97 MMOL/L — SIGNIFICANT CHANGE UP (ref 96–108)
CHLORIDE SERPL-SCNC: 92 MMOL/L — LOW (ref 96–108)
CO2 BLDV-SCNC: 40 MMOL/L — HIGH (ref 22–30)
CO2 SERPL-SCNC: 32 MMOL/L — HIGH (ref 22–31)
COLOR SPEC: YELLOW — SIGNIFICANT CHANGE UP
CREAT SERPL-MCNC: 0.88 MG/DL — SIGNIFICANT CHANGE UP (ref 0.5–1.3)
DIFF PNL FLD: NEGATIVE — SIGNIFICANT CHANGE UP
EPI CELLS # UR: 1 /HPF — SIGNIFICANT CHANGE UP
GAS PNL BLDV: 134 MMOL/L — LOW (ref 135–145)
GAS PNL BLDV: SIGNIFICANT CHANGE UP
GLUCOSE BLDV-MCNC: 106 MG/DL — HIGH (ref 70–99)
GLUCOSE SERPL-MCNC: 97 MG/DL — SIGNIFICANT CHANGE UP (ref 70–99)
GLUCOSE UR QL: NEGATIVE — SIGNIFICANT CHANGE UP
HCO3 BLDV-SCNC: 38 MMOL/L — HIGH (ref 21–29)
HCT VFR BLD CALC: 39.4 % — SIGNIFICANT CHANGE UP (ref 39–50)
HCT VFR BLDA CALC: 33 % — LOW (ref 39–50)
HGB BLD CALC-MCNC: 10.6 G/DL — LOW (ref 13–17)
HGB BLD-MCNC: 11.5 G/DL — LOW (ref 13–17)
HYALINE CASTS # UR AUTO: 10 /LPF — HIGH (ref 0–2)
INR BLD: 1.71 RATIO — HIGH (ref 0.88–1.16)
KETONES UR-MCNC: NEGATIVE — SIGNIFICANT CHANGE UP
LACTATE BLDV-MCNC: 1.2 MMOL/L — SIGNIFICANT CHANGE UP (ref 0.7–2)
LEUKOCYTE ESTERASE UR-ACNC: NEGATIVE — SIGNIFICANT CHANGE UP
MCHC RBC-ENTMCNC: 24.3 PG — LOW (ref 27–34)
MCHC RBC-ENTMCNC: 29.1 GM/DL — LOW (ref 32–36)
MCV RBC AUTO: 83.4 FL — SIGNIFICANT CHANGE UP (ref 80–100)
NITRITE UR-MCNC: NEGATIVE — SIGNIFICANT CHANGE UP
NT-PROBNP SERPL-SCNC: 2459 PG/ML — HIGH (ref 0–300)
PCO2 BLDV: 77 MMHG — HIGH (ref 35–50)
PH BLDV: 7.31 — LOW (ref 7.35–7.45)
PH UR: 6 — SIGNIFICANT CHANGE UP (ref 5–8)
PLATELET # BLD AUTO: 345 K/UL — SIGNIFICANT CHANGE UP (ref 150–400)
PO2 BLDV: 32 MMHG — SIGNIFICANT CHANGE UP (ref 25–45)
POTASSIUM BLDV-SCNC: 3.6 MMOL/L — SIGNIFICANT CHANGE UP (ref 3.5–5.3)
POTASSIUM SERPL-MCNC: 3.5 MMOL/L — SIGNIFICANT CHANGE UP (ref 3.5–5.3)
POTASSIUM SERPL-SCNC: 3.5 MMOL/L — SIGNIFICANT CHANGE UP (ref 3.5–5.3)
PROT SERPL-MCNC: 7.1 G/DL — SIGNIFICANT CHANGE UP (ref 6–8.3)
PROT UR-MCNC: ABNORMAL
PROTHROM AB SERPL-ACNC: 19.8 SEC — HIGH (ref 10–12.9)
RBC # BLD: 4.72 M/UL — SIGNIFICANT CHANGE UP (ref 4.2–5.8)
RBC # FLD: 14.3 % — SIGNIFICANT CHANGE UP (ref 10.3–14.5)
RBC CASTS # UR COMP ASSIST: 2 /HPF — SIGNIFICANT CHANGE UP (ref 0–4)
SAO2 % BLDV: 51 % — LOW (ref 67–88)
SODIUM SERPL-SCNC: 138 MMOL/L — SIGNIFICANT CHANGE UP (ref 135–145)
SP GR SPEC: 1.03 — HIGH (ref 1.01–1.02)
TROPONIN T, HIGH SENSITIVITY RESULT: 8 NG/L — SIGNIFICANT CHANGE UP (ref 0–51)
UROBILINOGEN FLD QL: ABNORMAL
WBC # BLD: 10.2 K/UL — SIGNIFICANT CHANGE UP (ref 3.8–10.5)
WBC # FLD AUTO: 10.2 K/UL — SIGNIFICANT CHANGE UP (ref 3.8–10.5)
WBC UR QL: 1 /HPF — SIGNIFICANT CHANGE UP (ref 0–5)

## 2019-08-09 PROCEDURE — 93306 TTE W/DOPPLER COMPLETE: CPT | Mod: 26

## 2019-08-09 PROCEDURE — 71045 X-RAY EXAM CHEST 1 VIEW: CPT | Mod: 26

## 2019-08-09 PROCEDURE — 99291 CRITICAL CARE FIRST HOUR: CPT

## 2019-08-09 PROCEDURE — 99291 CRITICAL CARE FIRST HOUR: CPT | Mod: GC

## 2019-08-09 PROCEDURE — 93289 INTERROG DEVICE EVAL HEART: CPT | Mod: 26

## 2019-08-09 PROCEDURE — 93010 ELECTROCARDIOGRAM REPORT: CPT

## 2019-08-09 RX ORDER — VANCOMYCIN HCL 1 G
1000 VIAL (EA) INTRAVENOUS ONCE
Refills: 0 | Status: COMPLETED | OUTPATIENT
Start: 2019-08-09 | End: 2019-08-09

## 2019-08-09 RX ORDER — DOCUSATE SODIUM 100 MG
100 CAPSULE ORAL THREE TIMES A DAY
Refills: 0 | Status: DISCONTINUED | OUTPATIENT
Start: 2019-08-09 | End: 2019-08-16

## 2019-08-09 RX ORDER — AMIODARONE HYDROCHLORIDE 400 MG/1
1 TABLET ORAL
Qty: 900 | Refills: 0 | Status: DISCONTINUED | OUTPATIENT
Start: 2019-08-09 | End: 2019-08-09

## 2019-08-09 RX ORDER — APIXABAN 2.5 MG/1
5 TABLET, FILM COATED ORAL
Refills: 0 | Status: DISCONTINUED | OUTPATIENT
Start: 2019-08-09 | End: 2019-08-16

## 2019-08-09 RX ORDER — TAMSULOSIN HYDROCHLORIDE 0.4 MG/1
0.4 CAPSULE ORAL AT BEDTIME
Refills: 0 | Status: DISCONTINUED | OUTPATIENT
Start: 2019-08-09 | End: 2019-08-16

## 2019-08-09 RX ORDER — SENNA PLUS 8.6 MG/1
2 TABLET ORAL AT BEDTIME
Refills: 0 | Status: DISCONTINUED | OUTPATIENT
Start: 2019-08-09 | End: 2019-08-16

## 2019-08-09 RX ORDER — POTASSIUM CHLORIDE 20 MEQ
40 PACKET (EA) ORAL EVERY 4 HOURS
Refills: 0 | Status: COMPLETED | OUTPATIENT
Start: 2019-08-09 | End: 2019-08-09

## 2019-08-09 RX ORDER — ATORVASTATIN CALCIUM 80 MG/1
40 TABLET, FILM COATED ORAL AT BEDTIME
Refills: 0 | Status: DISCONTINUED | OUTPATIENT
Start: 2019-08-09 | End: 2019-08-16

## 2019-08-09 RX ORDER — AMIODARONE HYDROCHLORIDE 400 MG/1
200 TABLET ORAL DAILY
Refills: 0 | Status: DISCONTINUED | OUTPATIENT
Start: 2019-08-13 | End: 2019-08-13

## 2019-08-09 RX ORDER — FUROSEMIDE 40 MG
40 TABLET ORAL DAILY
Refills: 0 | Status: DISCONTINUED | OUTPATIENT
Start: 2019-08-09 | End: 2019-08-10

## 2019-08-09 RX ORDER — HEPARIN SODIUM 5000 [USP'U]/ML
5000 INJECTION INTRAVENOUS; SUBCUTANEOUS EVERY 8 HOURS
Refills: 0 | Status: DISCONTINUED | OUTPATIENT
Start: 2019-08-09 | End: 2019-08-09

## 2019-08-09 RX ORDER — CHLORHEXIDINE GLUCONATE 213 G/1000ML
1 SOLUTION TOPICAL
Refills: 0 | Status: DISCONTINUED | OUTPATIENT
Start: 2019-08-09 | End: 2019-08-16

## 2019-08-09 RX ORDER — AMIODARONE HYDROCHLORIDE 400 MG/1
TABLET ORAL
Refills: 0 | Status: DISCONTINUED | OUTPATIENT
Start: 2019-08-13 | End: 2019-08-13

## 2019-08-09 RX ORDER — BUDESONIDE AND FORMOTEROL FUMARATE DIHYDRATE 160; 4.5 UG/1; UG/1
2 AEROSOL RESPIRATORY (INHALATION)
Refills: 0 | Status: DISCONTINUED | OUTPATIENT
Start: 2019-08-09 | End: 2019-08-16

## 2019-08-09 RX ORDER — PANTOPRAZOLE SODIUM 20 MG/1
40 TABLET, DELAYED RELEASE ORAL
Refills: 0 | Status: DISCONTINUED | OUTPATIENT
Start: 2019-08-09 | End: 2019-08-16

## 2019-08-09 RX ORDER — METOPROLOL TARTRATE 50 MG
25 TABLET ORAL
Refills: 0 | Status: DISCONTINUED | OUTPATIENT
Start: 2019-08-09 | End: 2019-08-11

## 2019-08-09 RX ORDER — SOTALOL HCL 120 MG
160 TABLET ORAL EVERY 12 HOURS
Refills: 0 | Status: DISCONTINUED | OUTPATIENT
Start: 2019-08-09 | End: 2019-08-09

## 2019-08-09 RX ORDER — POLYETHYLENE GLYCOL 3350 17 G/17G
17 POWDER, FOR SOLUTION ORAL DAILY
Refills: 0 | Status: DISCONTINUED | OUTPATIENT
Start: 2019-08-09 | End: 2019-08-09

## 2019-08-09 RX ORDER — TIOTROPIUM BROMIDE 18 UG/1
1 CAPSULE ORAL; RESPIRATORY (INHALATION) DAILY
Refills: 0 | Status: DISCONTINUED | OUTPATIENT
Start: 2019-08-09 | End: 2019-08-16

## 2019-08-09 RX ORDER — ALBUTEROL 90 UG/1
2 AEROSOL, METERED ORAL EVERY 6 HOURS
Refills: 0 | Status: DISCONTINUED | OUTPATIENT
Start: 2019-08-09 | End: 2019-08-10

## 2019-08-09 RX ORDER — FINASTERIDE 5 MG/1
5 TABLET, FILM COATED ORAL DAILY
Refills: 0 | Status: DISCONTINUED | OUTPATIENT
Start: 2019-08-09 | End: 2019-08-16

## 2019-08-09 RX ORDER — AMIODARONE HYDROCHLORIDE 400 MG/1
400 TABLET ORAL EVERY 8 HOURS
Refills: 0 | Status: COMPLETED | OUTPATIENT
Start: 2019-08-09 | End: 2019-08-13

## 2019-08-09 RX ORDER — PIPERACILLIN AND TAZOBACTAM 4; .5 G/20ML; G/20ML
3.38 INJECTION, POWDER, LYOPHILIZED, FOR SOLUTION INTRAVENOUS ONCE
Refills: 0 | Status: COMPLETED | OUTPATIENT
Start: 2019-08-09 | End: 2019-08-09

## 2019-08-09 RX ADMIN — AMIODARONE HYDROCHLORIDE 400 MILLIGRAM(S): 400 TABLET ORAL at 18:40

## 2019-08-09 RX ADMIN — BUDESONIDE AND FORMOTEROL FUMARATE DIHYDRATE 2 PUFF(S): 160; 4.5 AEROSOL RESPIRATORY (INHALATION) at 21:16

## 2019-08-09 RX ADMIN — APIXABAN 5 MILLIGRAM(S): 2.5 TABLET, FILM COATED ORAL at 20:49

## 2019-08-09 RX ADMIN — ATORVASTATIN CALCIUM 40 MILLIGRAM(S): 80 TABLET, FILM COATED ORAL at 21:49

## 2019-08-09 RX ADMIN — Medication 250 MILLIGRAM(S): at 16:44

## 2019-08-09 RX ADMIN — Medication 160 MILLIGRAM(S): at 20:49

## 2019-08-09 RX ADMIN — PIPERACILLIN AND TAZOBACTAM 200 GRAM(S): 4; .5 INJECTION, POWDER, LYOPHILIZED, FOR SOLUTION INTRAVENOUS at 16:17

## 2019-08-09 RX ADMIN — CHLORHEXIDINE GLUCONATE 1 APPLICATION(S): 213 SOLUTION TOPICAL at 20:49

## 2019-08-09 RX ADMIN — FINASTERIDE 5 MILLIGRAM(S): 5 TABLET, FILM COATED ORAL at 20:49

## 2019-08-09 RX ADMIN — TAMSULOSIN HYDROCHLORIDE 0.4 MILLIGRAM(S): 0.4 CAPSULE ORAL at 21:50

## 2019-08-09 RX ADMIN — ALBUTEROL 2 PUFF(S): 90 AEROSOL, METERED ORAL at 21:14

## 2019-08-09 RX ADMIN — Medication 40 MILLIEQUIVALENT(S): at 18:16

## 2019-08-09 RX ADMIN — AMIODARONE HYDROCHLORIDE 33.33 MG/MIN: 400 TABLET ORAL at 16:20

## 2019-08-09 RX ADMIN — Medication 40 MILLIEQUIVALENT(S): at 21:49

## 2019-08-09 NOTE — ED PROVIDER NOTE - OBJECTIVE STATEMENT
69M COPD, HTN presens as transfer from OSH for VTach and AICD firing. Was hypotensive at OSH, given fluids and Amio load transferred on amio drip. Patient has not complaints. Was sent to OSH from rehab after fall at home. Noted to have AICD fired in rehab. Notes fevers, chills and cough at home. No other complaints.

## 2019-08-09 NOTE — ED ADULT NURSE NOTE - NSIMPLEMENTINTERV_GEN_ALL_ED
Implemented All Fall with Harm Risk Interventions:  [employer] to call system. Call bell, personal items and telephone within reach. Instruct patient to call for assistance. Room bathroom lighting operational. Non-slip footwear when patient is off stretcher. Physically safe environment: no spills, clutter or unnecessary equipment. Stretcher in lowest position, wheels locked, appropriate side rails in place. Provide visual cue, wrist band, yellow gown, etc. Monitor gait and stability. Monitor for mental status changes and reorient to person, place, and time. Review medications for side effects contributing to fall risk. Reinforce activity limits and safety measures with patient and family. Provide visual clues: red socks.

## 2019-08-09 NOTE — H&P ADULT - NSHPLABSRESULTS_GEN_ALL_CORE
LABS:	 	                        11.5   10.2  )-----------( 345      ( 09 Aug 2019 16:28 )             39.4     Auto Eosinophil # x     / Auto Eosinophil % x     / Auto Neutrophil # x     / Auto Neutrophil % x     / BANDS % x        INR: 1.71 ratio (08-09 @ 16:28)    08-09    138  |  92<L>  |  17  ----------------------------<  97  3.5   |  32<H>  |  0.88    Ca    8.8      09 Aug 2019 16:28  TPro  7.1  /  Alb  3.0<L>  /  TBili  0.5  /  DBili  x   /  AST  21  /  ALT  16  /  AlkPhos  140<H>  08-09    proBNP: Serum Pro-Brain Natriuretic Peptide: 2459 pg/mL (08-09 @ 16:28)    Lipid Profile: 07-06 Chol 117 LDL 48 HDL 55 Trig 68  HgA1c: 5.6 % (07-05 @ 20:16)      	  	  JORGE ALBERTO VailP  Contact # LABS:	 	                        11.5   10.2  )-----------( 345      ( 09 Aug 2019 16:28 )             39.4     Auto Eosinophil # x     / Auto Eosinophil % x     / Auto Neutrophil # x     / Auto Neutrophil % x     / BANDS % x        INR: 1.71 ratio (08-09 @ 16:28)    08-09    138  |  92<L>  |  17  ----------------------------<  97  3.5   |  32<H>  |  0.88    Ca    8.8      09 Aug 2019 16:28  TPro  7.1  /  Alb  3.0<L>  /  TBili  0.5  /  DBili  x   /  AST  21  /  ALT  16  /  AlkPhos  140<H>  08-09    proBNP: Serum Pro-Brain Natriuretic Peptide: 2459 pg/mL (08-09 @ 16:28)    Lipid Profile: 07-06 Chol 117 LDL 48 HDL 55 Trig 68  HgA1c: 5.6 % (07-05 @ 20:16)      	< from: Xray Chest 1 View AP/PA (08.09.19 @ 15:21) >    The mediastinal cardiac silhouette is unremarkable. There is an ACID. .    Pacer pad overlies right chest wall. Evaluation is limited by patient   rotation to the right. Opacity at the right lung base may represent   atelectasis, effusion, or superimposed soft tissues in this rotated   patient. Clear left lung.    No acute osseous finding.     < end of copied text >      	  [facility]  Contact #[id number]

## 2019-08-09 NOTE — H&P ADULT - NSHPREVIEWOFSYSTEMS_GEN_ALL_CORE
since discharge for hospital he has been feeling well until last 2 days feels like has a sinus cold  REVIEW OF SYSTEMS:  CONSTITUTIONAL:  No weight loss, fever, chills, weakness or fatigue.  HEENT:  Eyes:  No visual loss, blurred vision, double vision or yellow sclerae. Ears, Nose, Throat:  No hearing loss, sneezing, congestion, runny nose or sore throat.  SKIN:  No rash or itching.  CARDIOVASCULAR:  No chest pain, chest pressure or chest discomfort. No palpitations or edema.  RESPIRATORY:  No shortness of breath, cough or sputum.  PSYCHIATRIC:  No history of depression or anxiety.

## 2019-08-09 NOTE — ED PROVIDER NOTE - PMH
COPD (chronic obstructive pulmonary disease)    Essential hypertension    HTN (hypertension)    Simple chronic bronchitis

## 2019-08-09 NOTE — H&P ADULT - ASSESSMENT
70 y/o M w/ pmhx of HTN, HLD, Gout, MORRO (on CPAP, Home O2 2L), afib, recently admitted for VT, s/p failed VT ablation with new ICD now with VT. English

## 2019-08-09 NOTE — H&P ADULT - NSHPPHYSICALEXAM_GEN_ALL_CORE
Appearance: Normal, NAD  	HEAD:  Atraumatic, Normocephalic  	EYES:  PERRLA, conjunctiva and sclera clear  	NECK: Supple, No JVD  	CHEST/LUNG: expiratory wheezing on right >left  	HEART: Normal S1, S2. No murmurs, rubs, or gallops  	ABDOMEN: + Bowel sounds, Soft, NT, ND   	EXTREMITIES:  2+ Peripheral Pulses, No clubbing, cyanosis, or edema  	NEUROLOGY: non-focal, aaox3

## 2019-08-09 NOTE — ED ADULT NURSE NOTE - OBJECTIVE STATEMENT
69M pt AxOx3 BIBA Tx [facility]'s s/p AICD firing 5x and Vtach. PMHx Afib on Xarelto, HTN, AICD, COPD on 3L/NC @ baseline and uses CPAP at night. Pt denies complaints at this time. Pt denies CP/SOB/N/V/D/fever/chills. Pt arrives from [facility]'s w/ Amiodarone infusing @ 1mg/min via #20G L wrist, and #20G R wrist noted, both +patent/blood return, sites benign. AICD suture site noted to L upper chest wall, dressings in place, C/D/I no redness noted. Lungs clear, resp even, unlabored.  Pt placed on 4L/NC, tolerating well. Abd soft, distended (baseline). +1 pitting edema noted to b/l lower extremities. Pt at baseline ambulates w/ assist. #18G RAC, labs drawn and sent. EKG @ bedside. NAD noted. MDs at bedside for eval. Safety maintained. Bed locked in lowest position. Family at bedside. Will continue to monitor.

## 2019-08-09 NOTE — CHART NOTE - NSCHARTNOTEFT_GEN_A_CORE
Patient with known history of biventricular systolic heart failure, nonischemic cardiomyopathy, status post recent VT ablation, now presents with multiple ICD shocks.  6 shocks delivered for VT after failure of ATP to abort rhythm.  On sotalol. QTc at baseline.  Hypokalemia noted - will replete potassium.    Amiodarone loading per EP. Patient with known history of biventricular systolic heart failure, nonischemic cardiomyopathy, status post recent VT ablation, now presents with multiple ICD shocks.  6 shocks delivered for VT after failure of ATP to abort rhythm.  On sotalol. QTc at baseline.  Hypokalemia noted - will replete potassium.    Amiodarone loading per EP.    I have personally spent 30 minutes of critical care time excluding time spent on separate procedures.    Case discussed with Corina Vivar MD and cardiology fellow on call, Darren Fishman MD.

## 2019-08-09 NOTE — H&P ADULT - HISTORY OF PRESENT ILLNESS
68 y/o M w/ pmhx of HTN, HLD, Gout, MORRO (on CPAP, Home O2 2L), afib, recently admitted for VT, s/p failed VT ablation now s/p ICD and sotalol load.  Now transferred from [facility] for syncope.  In [facility] ED ICD interrogated and found to have multiple episodes of VT.  Admitted for ICD firing.

## 2019-08-09 NOTE — CONSULT NOTE ADULT - SUBJECTIVE AND OBJECTIVE BOX
CHIEF COMPLAINT: recurrent shock, syncope.      PAST MEDICAL & SURGICAL HISTORY:  COPD (chronic obstructive pulmonary disease)  HTN (hypertension)  Simple chronic bronchitis  Essential hypertension  No significant past surgical history  No significant past surgical history      HPI: 69 year male with history of nonischemic CM, advanced lung disease, persistent atrial fibrillation , VT, syncope, admitted approx. 1 month ago w.u negative CAD, post failed attempt ablation, placed on sotalol, ICD placed, now in [place] . states that over past 2-3 days worsening shortness of breath, nasal congestion , chills, today began to have ICD shock therapy with associated dizziness ,near syncope. sent urgently to [place] ER- I was called, transferred to [facility] for further evaluation.    ICD demonstrating 13 episodes of VT that received ATP and 6 episodes requiring shock termination    EKG pending    CXR shows the right lung volume reduced ? infiltrated verses collapse with possible effusion.                PREVIOUS DIAGNOSTIC TESTING:      ECHO  FINDINGS:    STRESS  FINDINGS:    CATHETERIZATION  FINDINGS:    ELECTROPHYSIOLOGY STUDY  FINDINGS:    CAROTID ULTRASOUND:  FINDINGS    VENOUS DUPLEX SCAN:  FINDINGS:    CHEST CT PULMONARY ANGIO with IV Contrast:  FINDINGS:  MEDICATIONS  (STANDING):  ALBUTerol    90 MICROgram(s) HFA Inhaler 2 Puff(s) Inhalation every 6 hours  amiodarone    Tablet   Oral   amiodarone    Tablet 400 milliGRAM(s) Oral every 8 hours  apixaban 5 milliGRAM(s) Oral two times a day  atorvastatin 40 milliGRAM(s) Oral at bedtime  buDESOnide 160 MICROgram(s)/formoterol 4.5 MICROgram(s) Inhaler 2 Puff(s) Inhalation two times a day  chlorhexidine 2% Cloths 1 Application(s) Topical <User Schedule>  docusate sodium 100 milliGRAM(s) Oral three times a day  finasteride 5 milliGRAM(s) Oral daily  furosemide    Tablet 40 milliGRAM(s) Oral daily  pantoprazole    Tablet 40 milliGRAM(s) Oral before breakfast  potassium chloride    Tablet ER 40 milliEquivalent(s) Oral every 4 hours  senna 2 Tablet(s) Oral at bedtime  sotalol 160 milliGRAM(s) Oral every 12 hours  tamsulosin 0.4 milliGRAM(s) Oral at bedtime  tiotropium 18 MICROgram(s) Capsule 1 Capsule(s) Inhalation daily    MEDICATIONS  (PRN):      FAMILY HISTORY:  No pertinent family history in first degree relatives  No pertinent family history in first degree relatives      SOCIAL HISTORY:    CIGARETTES:    ALCOHOL:    REVIEW OF SYSTEMS:    CONSTITUTIONAL: No fever, weight loss, chills, shakes, or fatigue  EYES: No eye pain, visual disturbances, or discharge  ENMT:  No difficulty hearing, tinnitus, vertigo; No sinus or throat pain  NECK: No pain or stiffness  BREASTS: No pain, masses, or nipple discharge  RESPIRATORY: No cough, wheezing, hemoptysis, or shortness of breath  CARDIOVASCULAR: No chest pain, dyspnea, palpitations, dizziness, syncope, paroxysmal nocturnal dyspnea, orthopnea, or arm or leg swelling  GASTROINTESTINAL: No abdominal  or epigastric pain, nausea, vomiting, hematemesis, diarrhea, constipation, melena or bright red blood.  GENITOURINARY: No dysuria, nocturia, hematuria, or urinary incontinence  NEUROLOGICAL: No headaches, memory loss, slurred speech, limb weakness, loss of strength, numbness, or tremors  SKIN: No itching, burning, rashes, or lesions   LYMPH NODES: No enlarged glands  ENDOCRINE: No heat or cold intolerance, or hair loss  MUSCULOSKELETAL: No joint pain or swelling, muscle, back, or extremity pain  PSYCHIATRIC: No depression, anxiety, or difficulty sleeping  HEME/LYMPH: No easy bruising or bleeding gums  ALLERY AND IMMUNOLOGIC: No hives or rash.      Vital Signs Last 24 Hrs  T(C): 36.4 (09 Aug 2019 19:00), Max: 36.9 (09 Aug 2019 17:30)  T(F): 97.5 (09 Aug 2019 19:00), Max: 98.4 (09 Aug 2019 17:30)  HR: 73 (09 Aug 2019 19:00) (71 - 83)  BP: 119/62 (09 Aug 2019 19:00) (108/69 - 125/68)  BP(mean): 85 (09 Aug 2019 19:00) (77 - 91)  RR: 22 (09 Aug 2019 19:00) (18 - 22)  SpO2: 95% (09 Aug 2019 19:00) (87% - 98%)  Daily Height in cm: 182.88 (09 Aug 2019 17:09)    Daily Weight in k.1 (09 Aug 2019 17:09)     @ 07:  -   @ 20:45  --------------------------------------------------------  IN: 253.2 mL / OUT: 150 mL / NET: 103.2 mL          PHYSICAL EXAM:    GENERAL: In no apparent distress, well nourished, and hydrated.  HEAD:  Atraumatic, Normocephalic  EYES: EOMI, PERRLA, conjunctiva and sclera clear  ENMT: No tonsillar erythema, exudates, or enlargement; Moist mucous membranes, Good dentition, No lesions  NECK: Supple and normal thyroid.  No JVD or carotid bruit.  Carotid pulse is 2+ bilaterally.  HEART: Regular rate and rhythm; No murmurs, rubs, or gallops.  PULMONARY: Clear to auscultation and perfusion.  No rales, wheezing, or rhonchi bilaterally.  ABDOMEN: Soft, Nontender, Nondistended; Bowel sounds present  EXTREMITIES:  2+ Peripheral Pulses, No clubbing, cyanosis, or edema  LYMPH: No lymphadenopathy noted  NEUROLOGICAL: Grossly nonfocal          INTERPRETATION OF TELEMETRY:    ECG:    I&O's Detail    09 Aug 2019 07:01  -  09 Aug 2019 20:45  --------------------------------------------------------  IN:    amiodarone Infusion: 133.2 mL    Oral Fluid: 120 mL  Total IN: 253.2 mL    OUT:    Voided: 150 mL  Total OUT: 150 mL    Total NET: 103.2 mL          LABS:                        11.5   10.2  )-----------( 345      ( 09 Aug 2019 16:28 )             39.4         138  |  92<L>  |  17  ----------------------------<  97  3.5   |  32<H>  |  0.88    Ca    8.8      09 Aug 2019 16:28    TPro  7.1  /  Alb  3.0<L>  /  TBili  0.5  /  DBili  x   /  AST  21  /  ALT  16  /  AlkPhos  140<H>          PT/INR - ( 09 Aug 2019 16:28 )   PT: 19.8 sec;   INR: 1.71 ratio         PTT - ( 09 Aug 2019 16:28 )  PTT:34.3 sec  Urinalysis Basic - ( 09 Aug 2019 18:28 )    Color: Yellow / Appearance: Slightly Turbid / S.029 / pH: x  Gluc: x / Ketone: Negative  / Bili: Small / Urobili: 3 mg/dL   Blood: x / Protein: 30 mg/dL / Nitrite: Negative   Leuk Esterase: Negative / RBC: 2 /hpf / WBC 1 /HPF   Sq Epi: x / Non Sq Epi: 1 /hpf / Bacteria: Negative      BNPSerum Pro-Brain Natriuretic Peptide: 2459 pg/mL ( @ 16:28)    I&O's Detail    09 Aug 2019 07:01  -  09 Aug 2019 20:45  --------------------------------------------------------  IN:    amiodarone Infusion: 133.2 mL    Oral Fluid: 120 mL  Total IN: 253.2 mL    OUT:    Voided: 150 mL  Total OUT: 150 mL    Total NET: 103.2 mL        Daily Height in cm: 182.88 (09 Aug 2019 17:09)    Daily Weight in k.1 (09 Aug 2019 17:09)    RADIOLOGY & ADDITIONAL STUDIES:

## 2019-08-09 NOTE — ED PROVIDER NOTE - ATTENDING CONTRIBUTION TO CARE
69M COPD, HTN presens as transfer from OSH for VTach and AICD firing. Was hypotensive at OSH, given fluids and Amio load transferred on amio drip.  pt feels better here managing amio drip with hr 100vt stable bp, septic work up, seen by ep dr kolb to admit to ccu, ccu fellow called for admission, ppm site c/d/i placed last week. doesn't meet ss criteria but covered with abx as per dr kolb.

## 2019-08-09 NOTE — ED PROVIDER NOTE - PHYSICAL EXAMINATION
Meagan Salgado DO.:   GENERAL: Patient awake alert NAD.  HEENT: NC/AT, Moist mucous membranes, PERRL, EOMI.  LUNGS: Crackles right base with diffuse wheezing.  CARDIAC: RRR, no m/r/g.    ABDOMEN: Soft, NT, ND, No rebound, guarding. No CVA tenderness.   EXT: +2+ edema. No calf tenderness.  MSK: No spinal tenderness, no pain with movement, no deformities.  NEURO: A&Ox3. Moving all extremities.  SKIN: Warm and dry. No rash.  PSYCH: Normal affect.

## 2019-08-09 NOTE — CONSULT NOTE ADULT - ASSESSMENT
74 year male with nonischemic CM ,CHF,COPD, VT persistent AFIB with recurrent VT on sotalol, Qt ( preliminary WNL <. potassium reduced ? right     advise    1. amiodarone 300 mg IV bolus given , and IV amiodarone given x approx 6 hour. convert to amiodarone 400 mg TID   2. stop sotalol, initiate metoprolol 25 q 12, increase as tolerated  3. replete  K, mag  4. obtain final ekg check QT  5. obtain final CXR report, consider CT chest, pulmonary consult  6 obtain BNP,  reinitate CHF meds

## 2019-08-09 NOTE — ED PROVIDER NOTE - NS ED ROS FT
CONST: +fevers, no chills  EYES: no pain, no vision changes  ENT: no sore throat, no ear pain, no change in hearing  CV: no chest pain, no leg swelling  RESP: +shortness of breath, +cough  ABD: no abdominal pain, no nausea, no vomiting, no diarrhea  : no dysuria, no flank pain, no hematuria  MSK: no back pain, no extremity pain  NEURO: no headache or additional neurologic complaints  HEME: no easy bleeding  SKIN:  no rash

## 2019-08-09 NOTE — PROCEDURE NOTE - ADDITIONAL PROCEDURE DETAILS
1. ICD thresholds were not checked as patient came in with ICD shocks for VT  2. Battery longevity is 12years  3. Patient is not pacemaker dependent  4. Patient received a total of 13ATP and 6shocks this morning between 7:55am and 10:13am. Review of EGM's show a slow irregularly irregular rhythm with sudden onset of a monomorphic regular tachycardia @ ~220-230bpm in which therapies received. Tachycardia consistent w/ VT with ATP terminating VT but VT kept recurring. One episode patient had 2 ATP delivered each terminating VT but VT recurred then resulted in 6 shocks w/ each shock terminating VT but w/ recurrent VT. Informed results to Dr. Kallie Vivar, patient's EP who was present in ED.

## 2019-08-09 NOTE — H&P ADULT - NSHPSOCIALHISTORY_GEN_ALL_CORE
Lives with wife  Was a drinker  Quit smoking 18 years ago when he was diagnosed w/ COPD on CPAP  Denies recreational drug uses

## 2019-08-10 DIAGNOSIS — I48.2 CHRONIC ATRIAL FIBRILLATION: ICD-10-CM

## 2019-08-10 DIAGNOSIS — I50.23 ACUTE ON CHRONIC SYSTOLIC (CONGESTIVE) HEART FAILURE: ICD-10-CM

## 2019-08-10 DIAGNOSIS — I10 ESSENTIAL (PRIMARY) HYPERTENSION: ICD-10-CM

## 2019-08-10 DIAGNOSIS — J44.1 CHRONIC OBSTRUCTIVE PULMONARY DISEASE WITH (ACUTE) EXACERBATION: ICD-10-CM

## 2019-08-10 DIAGNOSIS — J44.9 CHRONIC OBSTRUCTIVE PULMONARY DISEASE, UNSPECIFIED: ICD-10-CM

## 2019-08-10 LAB
ALBUMIN SERPL ELPH-MCNC: 2.4 G/DL — LOW (ref 3.3–5)
ALP SERPL-CCNC: 132 U/L — HIGH (ref 40–120)
ALT FLD-CCNC: 16 U/L — SIGNIFICANT CHANGE UP (ref 10–45)
ANION GAP SERPL CALC-SCNC: 10 MMOL/L — SIGNIFICANT CHANGE UP (ref 5–17)
AST SERPL-CCNC: 24 U/L — SIGNIFICANT CHANGE UP (ref 10–40)
BASOPHILS # BLD AUTO: 0 K/UL — SIGNIFICANT CHANGE UP (ref 0–0.2)
BASOPHILS NFR BLD AUTO: 0.1 % — SIGNIFICANT CHANGE UP (ref 0–2)
BILIRUB SERPL-MCNC: 0.3 MG/DL — SIGNIFICANT CHANGE UP (ref 0.2–1.2)
BUN SERPL-MCNC: 16 MG/DL — SIGNIFICANT CHANGE UP (ref 7–23)
CALCIUM SERPL-MCNC: 8.9 MG/DL — SIGNIFICANT CHANGE UP (ref 8.4–10.5)
CHLORIDE SERPL-SCNC: 96 MMOL/L — SIGNIFICANT CHANGE UP (ref 96–108)
CO2 SERPL-SCNC: 31 MMOL/L — SIGNIFICANT CHANGE UP (ref 22–31)
CREAT SERPL-MCNC: 0.89 MG/DL — SIGNIFICANT CHANGE UP (ref 0.5–1.3)
EOSINOPHIL # BLD AUTO: 0.2 K/UL — SIGNIFICANT CHANGE UP (ref 0–0.5)
EOSINOPHIL NFR BLD AUTO: 2.1 % — SIGNIFICANT CHANGE UP (ref 0–6)
GLUCOSE SERPL-MCNC: 138 MG/DL — HIGH (ref 70–99)
HCT VFR BLD CALC: 34.6 % — LOW (ref 39–50)
HCT VFR BLD CALC: 35.3 % — LOW (ref 39–50)
HGB BLD-MCNC: 10.4 G/DL — LOW (ref 13–17)
HGB BLD-MCNC: 10.4 G/DL — LOW (ref 13–17)
LYMPHOCYTES # BLD AUTO: 1 K/UL — SIGNIFICANT CHANGE UP (ref 1–3.3)
LYMPHOCYTES # BLD AUTO: 10.1 % — LOW (ref 13–44)
MAGNESIUM SERPL-MCNC: 2.2 MG/DL — SIGNIFICANT CHANGE UP (ref 1.6–2.6)
MCHC RBC-ENTMCNC: 24.5 PG — LOW (ref 27–34)
MCHC RBC-ENTMCNC: 25.2 PG — LOW (ref 27–34)
MCHC RBC-ENTMCNC: 29.4 GM/DL — LOW (ref 32–36)
MCHC RBC-ENTMCNC: 30.1 GM/DL — LOW (ref 32–36)
MCV RBC AUTO: 83.2 FL — SIGNIFICANT CHANGE UP (ref 80–100)
MCV RBC AUTO: 83.5 FL — SIGNIFICANT CHANGE UP (ref 80–100)
MONOCYTES # BLD AUTO: 1.1 K/UL — HIGH (ref 0–0.9)
MONOCYTES NFR BLD AUTO: 10.9 % — SIGNIFICANT CHANGE UP (ref 2–14)
NEUTROPHILS # BLD AUTO: 7.9 K/UL — HIGH (ref 1.8–7.4)
NEUTROPHILS NFR BLD AUTO: 76.8 % — SIGNIFICANT CHANGE UP (ref 43–77)
PHOSPHATE SERPL-MCNC: 2.9 MG/DL — SIGNIFICANT CHANGE UP (ref 2.5–4.5)
PLATELET # BLD AUTO: 315 K/UL — SIGNIFICANT CHANGE UP (ref 150–400)
PLATELET # BLD AUTO: 333 K/UL — SIGNIFICANT CHANGE UP (ref 150–400)
POTASSIUM SERPL-MCNC: 4.6 MMOL/L — SIGNIFICANT CHANGE UP (ref 3.5–5.3)
POTASSIUM SERPL-SCNC: 4.6 MMOL/L — SIGNIFICANT CHANGE UP (ref 3.5–5.3)
PROT SERPL-MCNC: 6.9 G/DL — SIGNIFICANT CHANGE UP (ref 6–8.3)
RBC # BLD: 4.14 M/UL — LOW (ref 4.2–5.8)
RBC # BLD: 4.24 M/UL — SIGNIFICANT CHANGE UP (ref 4.2–5.8)
RBC # FLD: 14.1 % — SIGNIFICANT CHANGE UP (ref 10.3–14.5)
RBC # FLD: 14.2 % — SIGNIFICANT CHANGE UP (ref 10.3–14.5)
SODIUM SERPL-SCNC: 137 MMOL/L — SIGNIFICANT CHANGE UP (ref 135–145)
WBC # BLD: 10.3 K/UL — SIGNIFICANT CHANGE UP (ref 3.8–10.5)
WBC # BLD: 10.6 K/UL — HIGH (ref 3.8–10.5)
WBC # FLD AUTO: 10.3 K/UL — SIGNIFICANT CHANGE UP (ref 3.8–10.5)
WBC # FLD AUTO: 10.6 K/UL — HIGH (ref 3.8–10.5)

## 2019-08-10 PROCEDURE — 99291 CRITICAL CARE FIRST HOUR: CPT

## 2019-08-10 PROCEDURE — 99223 1ST HOSP IP/OBS HIGH 75: CPT | Mod: GC

## 2019-08-10 PROCEDURE — 71250 CT THORAX DX C-: CPT | Mod: 26

## 2019-08-10 PROCEDURE — 93010 ELECTROCARDIOGRAM REPORT: CPT

## 2019-08-10 RX ORDER — ALBUTEROL 90 UG/1
2 AEROSOL, METERED ORAL EVERY 6 HOURS
Refills: 0 | Status: DISCONTINUED | OUTPATIENT
Start: 2019-08-10 | End: 2019-08-10

## 2019-08-10 RX ORDER — NYSTATIN CREAM 100000 [USP'U]/G
1 CREAM TOPICAL
Refills: 0 | Status: DISCONTINUED | OUTPATIENT
Start: 2019-08-10 | End: 2019-08-16

## 2019-08-10 RX ORDER — IPRATROPIUM/ALBUTEROL SULFATE 18-103MCG
3 AEROSOL WITH ADAPTER (GRAM) INHALATION EVERY 4 HOURS
Refills: 0 | Status: DISCONTINUED | OUTPATIENT
Start: 2019-08-10 | End: 2019-08-13

## 2019-08-10 RX ORDER — FUROSEMIDE 40 MG
40 TABLET ORAL DAILY
Refills: 0 | Status: DISCONTINUED | OUTPATIENT
Start: 2019-08-10 | End: 2019-08-13

## 2019-08-10 RX ADMIN — APIXABAN 5 MILLIGRAM(S): 2.5 TABLET, FILM COATED ORAL at 17:22

## 2019-08-10 RX ADMIN — ATORVASTATIN CALCIUM 40 MILLIGRAM(S): 80 TABLET, FILM COATED ORAL at 21:37

## 2019-08-10 RX ADMIN — Medication 40 MILLIGRAM(S): at 06:11

## 2019-08-10 RX ADMIN — AMIODARONE HYDROCHLORIDE 400 MILLIGRAM(S): 400 TABLET ORAL at 17:22

## 2019-08-10 RX ADMIN — BUDESONIDE AND FORMOTEROL FUMARATE DIHYDRATE 2 PUFF(S): 160; 4.5 AEROSOL RESPIRATORY (INHALATION) at 06:49

## 2019-08-10 RX ADMIN — TIOTROPIUM BROMIDE 1 CAPSULE(S): 18 CAPSULE ORAL; RESPIRATORY (INHALATION) at 15:05

## 2019-08-10 RX ADMIN — BUDESONIDE AND FORMOTEROL FUMARATE DIHYDRATE 2 PUFF(S): 160; 4.5 AEROSOL RESPIRATORY (INHALATION) at 17:52

## 2019-08-10 RX ADMIN — TAMSULOSIN HYDROCHLORIDE 0.4 MILLIGRAM(S): 0.4 CAPSULE ORAL at 21:37

## 2019-08-10 RX ADMIN — APIXABAN 5 MILLIGRAM(S): 2.5 TABLET, FILM COATED ORAL at 06:12

## 2019-08-10 RX ADMIN — ALBUTEROL 2 PUFF(S): 90 AEROSOL, METERED ORAL at 06:50

## 2019-08-10 RX ADMIN — Medication 25 MILLIGRAM(S): at 17:22

## 2019-08-10 RX ADMIN — ALBUTEROL 2 PUFF(S): 90 AEROSOL, METERED ORAL at 17:51

## 2019-08-10 RX ADMIN — AMIODARONE HYDROCHLORIDE 400 MILLIGRAM(S): 400 TABLET ORAL at 02:27

## 2019-08-10 RX ADMIN — AMIODARONE HYDROCHLORIDE 400 MILLIGRAM(S): 400 TABLET ORAL at 10:34

## 2019-08-10 RX ADMIN — ALBUTEROL 2 PUFF(S): 90 AEROSOL, METERED ORAL at 13:22

## 2019-08-10 RX ADMIN — Medication 25 MILLIGRAM(S): at 06:12

## 2019-08-10 RX ADMIN — FINASTERIDE 5 MILLIGRAM(S): 5 TABLET, FILM COATED ORAL at 13:21

## 2019-08-10 RX ADMIN — NYSTATIN CREAM 1 APPLICATION(S): 100000 CREAM TOPICAL at 06:12

## 2019-08-10 RX ADMIN — PANTOPRAZOLE SODIUM 40 MILLIGRAM(S): 20 TABLET, DELAYED RELEASE ORAL at 06:12

## 2019-08-10 RX ADMIN — Medication 40 MILLIGRAM(S): at 13:31

## 2019-08-10 NOTE — PROGRESS NOTE ADULT - SUBJECTIVE AND OBJECTIVE BOX
Events:    Review Of Systems:  Constitutional: denies fever, chills, Fatigue   HEENT: denies Blurred vision, Eye Pain, Headache   Respiratory: denies Cough, Wheezing , Shortness of breath  Cardiovascular: denies Chest Pain, Palpitations,  CALDERON   Gastrointestinal: denies Abdominal Pain, Diarrhea, Constipation   Genitourinary: denies Nocturia, Dysuria, Incontinence  Extremities: denies Swelling, Joint Pain  Neurologic: denies Focal deficit, Paresthesias, Syncope  Lymphatic: denies Swelling, Lymphadenopathy   Skin: denies Rash, Ecchymoses, Wounds   Psychiatry: denies Depression, Suicidal/Homicidal Ideation, anxiety  [X ] 10 point review of systems is otherwise negative except as mentioned above         Medications:  ALBUTerol    90 MICROgram(s) HFA Inhaler 2 Puff(s) Inhalation every 6 hours  amiodarone    Tablet   Oral   amiodarone    Tablet 400 milliGRAM(s) Oral every 8 hours  apixaban 5 milliGRAM(s) Oral two times a day  atorvastatin 40 milliGRAM(s) Oral at bedtime  buDESOnide 160 MICROgram(s)/formoterol 4.5 MICROgram(s) Inhaler 2 Puff(s) Inhalation two times a day  chlorhexidine 2% Cloths 1 Application(s) Topical <User Schedule>  docusate sodium 100 milliGRAM(s) Oral three times a day  finasteride 5 milliGRAM(s) Oral daily  furosemide    Tablet 40 milliGRAM(s) Oral daily  metoprolol tartrate 25 milliGRAM(s) Oral two times a day  nystatin Powder 1 Application(s) Topical two times a day PRN  pantoprazole    Tablet 40 milliGRAM(s) Oral before breakfast  senna 2 Tablet(s) Oral at bedtime  tamsulosin 0.4 milliGRAM(s) Oral at bedtime  tiotropium 18 MICROgram(s) Capsule 1 Capsule(s) Inhalation daily    PMH/PSH/FH/SH: [ ] Unchanged  Vitals:  T(C): 36.9 (08-10-19 @ 08:00), Max: 36.9 (19 @ 17:30)  HR: 73 (08-10-19 @ 08:00) (64 - 104)  BP: 110/73 (08-10-19 @ 08:00) (104/65 - 125/68)  BP(mean): 87 (08-10-19 @ 08:00) (75 - 91)  RR: 18 (08-10-19 @ 08:00) (12 - 24)  SpO2: 96% (08-10-19 @ 08:00) (87% - 98%)  Wt(kg): --  Daily Height in cm: 182.88 (09 Aug 2019 17:09)    Daily Weight in k.6 (10 Aug 2019 06:00)  I&O's Summary    09 Aug 2019 07:01  -  10 Aug 2019 07:00  --------------------------------------------------------  IN: 493.2 mL / OUT: 1000 mL / NET: -506.8 mL        Physical Exam:  Appearance: [ ] Normal [ ] NAD  Eyes: [ ] PERRL [ ] EOMI  HENT: [ ] Normal oral muscosa [ ]NC/AT  Cardiovascular: [ ] S1 [ ] S2 [ ] RRR [ ] No m/r/g [ ]No edema [ ] JVP  Procedural Access Site: [ ] No hematoma [ ] Non-tender to palpation [ ] 2+ pulse [ ] No bruit [ ] No Ecchymosis  Respiratory: [ ] Clear to auscultation bilaterally  Gastrointestinal: [ ] Soft [ ] Non-tender [ ] Non-distended [ ] BS+  Musculoskeletal: [ ] No clubbing [ ] No joint deformity   Neurologic: [ ] Non-focal  Lymphatic: [ ] No lymphadenopathy  Psychiatry: [ ] AAOx3 [ ] Mood & affect appropriate  Skin: [ ] No rashes [ ] No ecchymoses [ ] No cyanosis    08-10    137  |  96  |  16  ----------------------------<  138<H>  4.6   |  31  |  0.89    Ca    8.9      10 Aug 2019 05:23  Phos  2.9     08-10  Mg     2.2     08-10    TPro  6.9  /  Alb  2.4<L>  /  TBili  0.3  /  DBili  x   /  AST  24  /  ALT  16  /  AlkPhos  132<H>  08-10    PT/INR - ( 09 Aug 2019 16:28 )   PT: 19.8 sec;   INR: 1.71 ratio         PTT - ( 09 Aug 2019 16:28 )  PTT:34.3 sec      Serum Pro-Brain Natriuretic Peptide: 2459 pg/mL ( @ 16:28)          ECG:    Echo:    Stress Testing:     Cath:    Imaging:    Interpretation of Telemetry: HPI:  70 y/o M w/ pmhx of HTN, HLD, Gout, MORRO (on CPAP, Home O2 2L), afib, recently admitted for VT, s/p failed VT ablation now s/p ICD and sotalol load.  Now transferred from [facility] for syncope.  In [facility] ED ICD interrogated and found to have multiple episodes of VT.  Admitted for ICD firing. (09 Aug 2019 17:43). Pt now being amiodarone loaded.    Events: No acute overnight events     Review Of Systems:  Constitutional: denies fever, chills, Fatigue   HEENT: denies Blurred vision, Eye Pain, Headache   Respiratory: denies Cough, Wheezing , Shortness of breath  Cardiovascular: denies Chest Pain, Palpitations,  CALDERON   Gastrointestinal: denies Abdominal Pain, Diarrhea, Constipation   Genitourinary: denies Nocturia, Dysuria, Incontinence  Extremities: denies Swelling, Joint Pain  Neurologic: denies Focal deficit, Paresthesias, Syncope  Lymphatic: denies Swelling, Lymphadenopathy   Skin: denies Rash, Ecchymoses, Wounds   Psychiatry: denies Depression, Suicidal/Homicidal Ideation, anxiety  [X ] 10 point review of systems is otherwise negative except as mentioned above         Medications:  ALBUTerol    90 MICROgram(s) HFA Inhaler 2 Puff(s) Inhalation every 6 hours  amiodarone    Tablet   Oral   amiodarone    Tablet 400 milliGRAM(s) Oral every 8 hours  apixaban 5 milliGRAM(s) Oral two times a day  atorvastatin 40 milliGRAM(s) Oral at bedtime  buDESOnide 160 MICROgram(s)/formoterol 4.5 MICROgram(s) Inhaler 2 Puff(s) Inhalation two times a day  chlorhexidine 2% Cloths 1 Application(s) Topical <User Schedule>  docusate sodium 100 milliGRAM(s) Oral three times a day  finasteride 5 milliGRAM(s) Oral daily  furosemide    Tablet 40 milliGRAM(s) Oral daily  metoprolol tartrate 25 milliGRAM(s) Oral two times a day  nystatin Powder 1 Application(s) Topical two times a day PRN  pantoprazole    Tablet 40 milliGRAM(s) Oral before breakfast  senna 2 Tablet(s) Oral at bedtime  tamsulosin 0.4 milliGRAM(s) Oral at bedtime  tiotropium 18 MICROgram(s) Capsule 1 Capsule(s) Inhalation daily    Vitals:  T(C): 36.9 (08-10-19 @ 08:00), Max: 36.9 (19 @ 17:30)  HR: 73 (08-10-19 @ 08:00) (64 - 104)  BP: 110/73 (08-10-19 @ 08:00) (104/65 - 125/68)  BP(mean): 87 (08-10-19 @ 08:00) (75 - 91)  RR: 18 (08-10-19 @ 08:00) (12 - 24)  SpO2: 96% (08-10-19 @ 08:00) (87% - 98%)    Daily Height in cm: 182.88 (09 Aug 2019 17:09)    Daily Weight in k.6 (10 Aug 2019 06:00)  I&O's Summary    09 Aug 2019 07:01  -  10 Aug 2019 07:00  --------------------------------------------------------  IN: 493.2 mL / OUT: 1000 mL / NET: -506.8 mL    Physical Exam:  Appearance: [ x] Normal [ x] NAD  Eyes: [x ] PERRL [x ] EOMI  HENT: [x ] Normal oral muscosa [ x]NC/AT  Cardiovascular: [x ] S1 [ x] S2 [x ] RRR NO edema. No JVD  Respiratory: Diminished breath sounds with fine exp wheezing. On 3 L NC  Gastrointestinal: [X ] Soft [ x] Non-tender [ x] Non-distended   Musculoskeletal: [ x] No clubbing [x ] No joint deformity   Neurologic: [x ] Non-focal  Lymphatic: [ x] No lymphadenopathy  Psychiatry: [x ] AAOx3 [x ] Mood & affect appropriate  Skin: [ x] No rashes [x ] No ecchymoses [x ] No cyanosis    08-10    137  |  96  |  16  ----------------------------<  138<H>  4.6   |  31  |  0.89    Ca    8.9      10 Aug 2019 05:23  Phos  2.9     08-10  Mg     2.2     08-10    TPro  6.9  /  Alb  2.4<L>  /  TBili  0.3  /  DBili  x   /  AST  24  /  ALT  16  /  AlkPhos  132<H>  08-10    PT/INR - ( 09 Aug 2019 16:28 )   PT: 19.8 sec;   INR: 1.71 ratio      PTT - ( 09 Aug 2019 16:28 )  PTT:34.3 sec    Serum Pro-Brain Natriuretic Peptide: 2459 pg/mL ( @ 16:28)    ECG: A Fib 83    Echo:     Cath:    Imaging:    Interpretation of Telemetry: HPI:  68 y/o M w/ pmhx of HTN, HLD, Gout, MORRO (on CPAP, Home O2 2L), afib, recently admitted for VT, s/p failed VT ablation now s/p ICD and sotalol load.  Now transferred from [facility] for syncope.  In [facility] ED ICD interrogated and found to have multiple episodes of VT.  Admitted for ICD firing. (09 Aug 2019 17:43). Pt now being amiodarone loaded.    Events: No acute overnight events     Review Of Systems:  Constitutional: denies fever, chills, Fatigue   HEENT: denies Blurred vision, Eye Pain, Headache   Respiratory: denies Cough, Wheezing , Shortness of breath  Cardiovascular: denies Chest Pain, Palpitations,  CALDERON   Gastrointestinal: denies Abdominal Pain, Diarrhea, Constipation   Genitourinary: denies Nocturia, Dysuria, Incontinence  Extremities: denies Swelling, Joint Pain  Neurologic: denies Focal deficit, Paresthesias, Syncope  Lymphatic: denies Swelling, Lymphadenopathy   Skin: denies Rash, Ecchymoses, Wounds   Psychiatry: denies Depression, Suicidal/Homicidal Ideation, anxiety  [X ] 10 point review of systems is otherwise negative except as mentioned above         Medications:  ALBUTerol    90 MICROgram(s) HFA Inhaler 2 Puff(s) Inhalation every 6 hours  amiodarone    Tablet   Oral   amiodarone    Tablet 400 milliGRAM(s) Oral every 8 hours  apixaban 5 milliGRAM(s) Oral two times a day  atorvastatin 40 milliGRAM(s) Oral at bedtime  buDESOnide 160 MICROgram(s)/formoterol 4.5 MICROgram(s) Inhaler 2 Puff(s) Inhalation two times a day  chlorhexidine 2% Cloths 1 Application(s) Topical <User Schedule>  docusate sodium 100 milliGRAM(s) Oral three times a day  finasteride 5 milliGRAM(s) Oral daily  furosemide    Tablet 40 milliGRAM(s) Oral daily  metoprolol tartrate 25 milliGRAM(s) Oral two times a day  nystatin Powder 1 Application(s) Topical two times a day PRN  pantoprazole    Tablet 40 milliGRAM(s) Oral before breakfast  senna 2 Tablet(s) Oral at bedtime  tamsulosin 0.4 milliGRAM(s) Oral at bedtime  tiotropium 18 MICROgram(s) Capsule 1 Capsule(s) Inhalation daily    Vitals:  T(C): 36.9 (08-10-19 @ 08:00), Max: 36.9 (19 @ 17:30)  HR: 73 (08-10-19 @ 08:00) (64 - 104)  BP: 110/73 (08-10-19 @ 08:00) (104/65 - 125/68)  BP(mean): 87 (08-10-19 @ 08:00) (75 - 91)  RR: 18 (08-10-19 @ 08:00) (12 - 24)  SpO2: 96% (08-10-19 @ 08:00) (87% - 98%)    Daily Height in cm: 182.88 (09 Aug 2019 17:09)    Daily Weight in k.6 (10 Aug 2019 06:00)  I&O's Summary    09 Aug 2019 07:01  -  10 Aug 2019 07:00  --------------------------------------------------------  IN: 493.2 mL / OUT: 1000 mL / NET: -506.8 mL    Physical Exam:  Appearance: [ x] Normal [ x] NAD  Eyes: [x ] PERRL [x ] EOMI  HENT: [x ] Normal oral muscosa [ x]NC/AT  Cardiovascular: [x ] S1 [ x] S2 [x ] RRR NO edema. No JVD  Respiratory: Diminished breath sounds with fine exp wheezing. On 3 L NC  Gastrointestinal: [X ] Soft [ x] Non-tender [ x] Non-distended   Musculoskeletal: [ x] No clubbing [x ] No joint deformity   Neurologic: [x ] Non-focal  Lymphatic: [ x] No lymphadenopathy  Psychiatry: [x ] AAOx3 [x ] Mood & affect appropriate  Skin: [ x] No rashes [x ] No ecchymoses [x ] No cyanosis    08-10    137  |  96  |  16  ----------------------------<  138<H>  4.6   |  31  |  0.89    Ca    8.9      10 Aug 2019 05:23  Phos  2.9     08-10  Mg     2.2     08-10    TPro  6.9  /  Alb  2.4<L>  /  TBili  0.3  /  DBili  x   /  AST  24  /  ALT  16  /  AlkPhos  132<H>  08-10    PT/INR - ( 09 Aug 2019 16:28 )   PT: 19.8 sec;   INR: 1.71 ratio      PTT - ( 09 Aug 2019 16:28 )  PTT:34.3 sec    Serum Pro-Brain Natriuretic Peptide: 2459 pg/mL ( @ 16:28)    ECG: A Fib 83    Echo:  < from: TTE with Doppler (w/Cont) (19 @ 17:39) >  1. Mitral annular calcification, otherwise normal mitral  valve. Mild mitral regurgitation.  2. Calcified trileaflet aortic valve with normal opening.  Minimal aortic regurgitation.  3. Mild concentric left ventricular hypertrophy.  4. Endocardial visualization enhanced with intravenous  injection of Ultrasonic Enhancing Agent (Definity).  Moderate segmental left ventricular systolic dysfunction.  No left ventricular thrombus. The mid anterior septum, the  apical inferior wall, the apical anterior wall, the apical  septum, the apical lateral wall, the mid anterior wall, and  the mid inferoseptum are hypokinetic.  5. Mild diastolic dysfunction (Stage I).  6. The right ventricle is not well visualized.  7. Normal pericardium with no pericardial effusion.    Cath: < from: Cardiac Cath Lab - Adult (19 @ 15:35) >  CORONARY VESSELS: The coronary circulation is left dominant.  LM:   --  LM: Normal.  LAD:   --  LAD: Normal.  CX:   --  Circumflex: Normal.  RCA:   --  RCA: Normal.    Imaging: < from: Xray Chest 1 View AP/PA (19 @ 15:21) >    Limited exam as above.    Interpretation of Telemetry: HPI:  68 y/o M w/ pmhx of HTN, HLD, Gout, MORRO (on CPAP, Home O2 2L), afib, recently admitted for VT, s/p failed VT ablation now s/p ICD and sotalol load.  Now transferred from [facility] for syncope.  In [facility] ED ICD interrogated and found to have multiple episodes of VT.  Admitted for ICD firing. (09 Aug 2019 17:43). Pt now being amiodarone loaded.    Events: No acute overnight events     Review Of Systems:  Constitutional: denies fever, chills, Fatigue   HEENT: denies Blurred vision, Eye Pain, Headache   Respiratory: denies Cough, Wheezing , Shortness of breath  Cardiovascular: denies Chest Pain, Palpitations,  CALDERON   Gastrointestinal: denies Abdominal Pain, Diarrhea, Constipation   Genitourinary: denies Nocturia, Dysuria, Incontinence  Extremities: denies Swelling, Joint Pain  Neurologic: denies Focal deficit, Paresthesias, Syncope  Lymphatic: denies Swelling, Lymphadenopathy   Skin: denies Rash, Ecchymoses, Wounds   Psychiatry: denies Depression, Suicidal/Homicidal Ideation, anxiety  [X ] 10 point review of systems is otherwise negative except as mentioned above         Medications:  ALBUTerol    90 MICROgram(s) HFA Inhaler 2 Puff(s) Inhalation every 6 hours  amiodarone    Tablet   Oral   amiodarone    Tablet 400 milliGRAM(s) Oral every 8 hours  apixaban 5 milliGRAM(s) Oral two times a day  atorvastatin 40 milliGRAM(s) Oral at bedtime  buDESOnide 160 MICROgram(s)/formoterol 4.5 MICROgram(s) Inhaler 2 Puff(s) Inhalation two times a day  chlorhexidine 2% Cloths 1 Application(s) Topical <User Schedule>  docusate sodium 100 milliGRAM(s) Oral three times a day  finasteride 5 milliGRAM(s) Oral daily  furosemide    Tablet 40 milliGRAM(s) Oral daily  metoprolol tartrate 25 milliGRAM(s) Oral two times a day  nystatin Powder 1 Application(s) Topical two times a day PRN  pantoprazole    Tablet 40 milliGRAM(s) Oral before breakfast  senna 2 Tablet(s) Oral at bedtime  tamsulosin 0.4 milliGRAM(s) Oral at bedtime  tiotropium 18 MICROgram(s) Capsule 1 Capsule(s) Inhalation daily    Vitals:  T(C): 36.9 (08-10-19 @ 08:00), Max: 36.9 (19 @ 17:30)  HR: 73 (08-10-19 @ 08:00) (64 - 104)  BP: 110/73 (08-10-19 @ 08:00) (104/65 - 125/68)  BP(mean): 87 (08-10-19 @ 08:00) (75 - 91)  RR: 18 (08-10-19 @ 08:00) (12 - 24)  SpO2: 96% (08-10-19 @ 08:00) (87% - 98%)    Daily Height in cm: 182.88 (09 Aug 2019 17:09)    Daily Weight in k.6 (10 Aug 2019 06:00)  I&O's Summary    09 Aug 2019 07:01  -  10 Aug 2019 07:00  --------------------------------------------------------  IN: 493.2 mL / OUT: 1000 mL / NET: -506.8 mL    Physical Exam:  Appearance: [ x] Normal [ x] NAD  Eyes: [x ] PERRL [x ] EOMI  HENT: [x ] Normal oral muscosa [ x]NC/AT  Cardiovascular: [x ] S1 [ x] S2 [x ] RRR NO edema. No JVD  Respiratory: Diminished breath sounds with fine exp wheezing. On 3 L NC  Gastrointestinal: [X ] Soft [ x] Non-tender [ x] Non-distended   Musculoskeletal: [ x] No clubbing [x ] No joint deformity   Neurologic: [x ] Non-focal  Lymphatic: [ x] No lymphadenopathy  Psychiatry: [x ] AAOx3 [x ] Mood & affect appropriate  Skin: [ x] No rashes [x ] No ecchymoses [x ] No cyanosis    08-10    137  |  96  |  16  ----------------------------<  138<H>  4.6   |  31  |  0.89    Ca    8.9      10 Aug 2019 05:23  Phos  2.9     08-10  Mg     2.2     08-10    TPro  6.9  /  Alb  2.4<L>  /  TBili  0.3  /  DBili  x   /  AST  24  /  ALT  16  /  AlkPhos  132<H>  08-10    PT/INR - ( 09 Aug 2019 16:28 )   PT: 19.8 sec;   INR: 1.71 ratio      PTT - ( 09 Aug 2019 16:28 )  PTT:34.3 sec    Serum Pro-Brain Natriuretic Peptide: 2459 pg/mL ( @ 16:28)    ECG: A Fib 83    Echo:  < from: TTE with Doppler (w/Cont) (19 @ 17:39) >  1. Mitral annular calcification, otherwise normal mitral  valve. Mild mitral regurgitation.  2. Calcified trileaflet aortic valve with normal opening.  Minimal aortic regurgitation.  3. Mild concentric left ventricular hypertrophy.  4. Endocardial visualization enhanced with intravenous  injection of Ultrasonic Enhancing Agent (Definity).  Moderate segmental left ventricular systolic dysfunction.  No left ventricular thrombus. The mid anterior septum, the  apical inferior wall, the apical anterior wall, the apical  septum, the apical lateral wall, the mid anterior wall, and  the mid inferoseptum are hypokinetic.  5. Mild diastolic dysfunction (Stage I).  6. The right ventricle is not well visualized.  7. Normal pericardium with no pericardial effusion.    Cath: < from: Cardiac Cath Lab - Adult (19 @ 15:35) >  CORONARY VESSELS: The coronary circulation is left dominant.  LM:   --  LM: Normal.  LAD:   --  LAD: Normal.  CX:   --  Circumflex: Normal.  RCA:   --  RCA: Normal.    Imaging: < from: Xray Chest 1 View AP/PA (19 @ 15:21) >    Limited exam as above.    Interpretation of Telemetry:  Af 80s

## 2019-08-10 NOTE — PROGRESS NOTE ADULT - ASSESSMENT
74 year male with nonischemic CM ,CHF,COPD, VT persistent AFIB with recurrent VT on sotalol, Qt  467, mild hypokalemia, worsening respiratory status x 3 days iwth CT showing loculated effusion, possible infiltrate, received amiodarone and lasix , no further VT . respiratory status improved.

## 2019-08-10 NOTE — PROGRESS NOTE ADULT - ATTENDING COMMENTS
Patient with known history of biventricular systolic heart failure, nonischemic cardiomyopathy, status post recent VT ablation, now presents with multiple ICD shocks.  6 shocks delivered for VT after failure of ATP to abort rhythm.  On sotalol. QTc at baseline. Will transition sotalol to metoprolol while loading with amiodarone.  Hypokalemia noted - will replete potassium.    Amiodarone loading per EP.

## 2019-08-10 NOTE — CONSULT NOTE ADULT - SUBJECTIVE AND OBJECTIVE BOX
CHIEF COMPLAINT:  Syncope    HPI:  69 year old male with history of HTN, HLD, MORRO (on BiPAP at home and continuous home O2 2L), AFib, and recent admission for VT s/p failed VT ablation and subsequent ICD placement / sotalol load presents as transfer from [facility] for syncope. ICD interrogation found multiple episodes of VT and ICD firing. Patient is now being loaded with amiodarone. Pulmonary called for COPD management. He does have a pulmonologist but does not recall name. Works out in [place]. He normally uses Advair and Spiriva at home. Has 50 pack year smoking history but quit 18 years ago. His respiratory symptoms are usually well controlled with inhaler therapy and oxygen. Denies recent COPD exacerbation requiring hospitalization. Currently has no complaints. Denies dyspnea, chest pain, cough, fevers, chills or sweats.    PAST MEDICAL & SURGICAL HISTORY:  COPD (chronic obstructive pulmonary disease)  HTN (hypertension)  Simple chronic bronchitis  Essential hypertension  No significant past surgical history  No significant past surgical history      FAMILY HISTORY:  No pertinent family history in first degree relatives  No pertinent family history in first degree relatives      SOCIAL HISTORY:  Smoking: [ ] Never Smoked [x] Former Smoker (__ packs x ___ years) [ ] Current Smoker  (__ packs x ___ years)  Substance Use: [x] Never Used [ ] Used ____  EtOH Use: Denies  Marital Status: [ ] Single [ ]  [ ]  [ ]   Sexual History:   Occupation:  Recent Travel:  Country of Birth:  Advance Directives:    Allergies    No Known Allergies    Intolerances        HOME MEDICATIONS:    REVIEW OF SYSTEMS:  Constitutional: [ ] negative [-] fevers [-] chills [ ] weight loss [ ] weight gain  HEENT: [ ] negative [ ] dry eyes [ ] eye irritation [ ] postnasal drip [ ] nasal congestion  CV: [ ] negative  [-] chest pain [-] orthopnea [-] palpitations [ ] murmur  Resp: [ ] negative [-] cough [-] shortness of breath [-] wheezing [-] sputum [-] hemoptysis  GI: [ ] negative [-] nausea [-] vomiting [-] diarrhea [-] constipation [-] abd pain [ ] dysphagia   : [ ] negative [-] dysuria [ ] nocturia [ ] hematuria [ ] increased urinary frequency  Musculoskeletal: [ ] negative [ ] back pain [-] myalgias [-] arthralgias [ ] fracture  Skin: [ ] negative [-] rash [ ] itch  Neurological: [ ] negative [-] headache [-] dizziness [ ] syncope [ ] weakness [ ] numbness  Psychiatric: [ ] negative [ ] anxiety [ ] depression  Endocrine: [ ] negative [ ] diabetes [ ] thyroid problem  Hematologic/Lymphatic: [ ] negative [ ] anemia [ ] bleeding problem  Allergic/Immunologic: [ ] negative [ ] itchy eyes [ ] nasal discharge [ ] hives [ ] angioedema  [ ] All other systems negative  [ ] Unable to assess ROS because ________    OBJECTIVE:  ICU Vital Signs Last 24 Hrs  T(C): 36.9 (10 Aug 2019 08:00), Max: 36.9 (09 Aug 2019 17:30)  T(F): 98.4 (10 Aug 2019 08:00), Max: 98.4 (09 Aug 2019 17:30)  HR: 80 (10 Aug 2019 12:57) (64 - 104)  BP: 122/58 (10 Aug 2019 10:00) (104/65 - 125/68)  BP(mean): 83 (10 Aug 2019 10:00) (75 - 91)  ABP: --  ABP(mean): --  RR: 16 (10 Aug 2019 10:00) (12 - 24)  SpO2: 91% (10 Aug 2019 12:57) (87% - 98%)         @ 07:01  -  08-10 @ 07:00  --------------------------------------------------------  IN: 493.2 mL / OUT: 1150 mL / NET: -656.8 mL    08-10 @ 07:01  -  08-10 @ 13:41  --------------------------------------------------------  IN: 120 mL / OUT: 900 mL / NET: -780 mL      CAPILLARY BLOOD GLUCOSE          PHYSICAL EXAM:  General: No apparent distress  HEENT: NC/AT  Neck: Supple  Respiratory: End expiratory wheezing posterior lung fields, decreased movement of air  Cardiovascular: RRR, no murmur, trace edema  Abdomen: Soft, nontender, nondistended  Extremities: Warm  Skin: Intact  Neurological: A&Ox3, no focal deficits  Psychiatry: Normal mood and affect    HOSPITAL MEDICATIONS:  apixaban 5 milliGRAM(s) Oral two times a day      amiodarone    Tablet   Oral   amiodarone    Tablet 400 milliGRAM(s) Oral every 8 hours  furosemide   Injectable 40 milliGRAM(s) IV Push daily  metoprolol tartrate 25 milliGRAM(s) Oral two times a day  tamsulosin 0.4 milliGRAM(s) Oral at bedtime    atorvastatin 40 milliGRAM(s) Oral at bedtime  finasteride 5 milliGRAM(s) Oral daily    ALBUTerol    90 MICROgram(s) HFA Inhaler 2 Puff(s) Inhalation every 6 hours  buDESOnide 160 MICROgram(s)/formoterol 4.5 MICROgram(s) Inhaler 2 Puff(s) Inhalation two times a day  tiotropium 18 MICROgram(s) Capsule 1 Capsule(s) Inhalation daily      docusate sodium 100 milliGRAM(s) Oral three times a day  pantoprazole    Tablet 40 milliGRAM(s) Oral before breakfast  senna 2 Tablet(s) Oral at bedtime            chlorhexidine 2% Cloths 1 Application(s) Topical <User Schedule>  nystatin Powder 1 Application(s) Topical two times a day PRN        LABS:                        10.4   10.6  )-----------( 333      ( 10 Aug 2019 12:14 )             35.3     Hgb Trend: 10.4<--, 10.4<--, 11.5<--  08-10    137  |  96  |  16  ----------------------------<  138<H>  4.6   |  31  |  0.89    Ca    8.9      10 Aug 2019 05:23  Phos  2.9     08-10  Mg     2.2     08-10    TPro  6.9  /  Alb  2.4<L>  /  TBili  0.3  /  DBili  x   /  AST  24  /  ALT  16  /  AlkPhos  132<H>  08-10    Creatinine Trend: 0.89<--, 0.88<--, 1.00<--, 0.89<--, 0.85<--, 0.91<--  PT/INR - ( 09 Aug 2019 16:28 )   PT: 19.8 sec;   INR: 1.71 ratio         PTT - ( 09 Aug 2019 16:28 )  PTT:34.3 sec  Urinalysis Basic - ( 09 Aug 2019 18:28 )    Color: Yellow / Appearance: Slightly Turbid / S.029 / pH: x  Gluc: x / Ketone: Negative  / Bili: Small / Urobili: 3 mg/dL   Blood: x / Protein: 30 mg/dL / Nitrite: Negative   Leuk Esterase: Negative / RBC: 2 /hpf / WBC 1 /HPF   Sq Epi: x / Non Sq Epi: 1 /hpf / Bacteria: Negative        Venous Blood Gas:   @ 16:28  7.31/77/32/38/51  VBG Lactate: 1.2      MICROBIOLOGY:     RADIOLOGY:  [ ] Reviewed and interpreted by me    < from: CT Chest No Cont (08.10.19 @ 12:48) >  FINDINGS:    LUNGS, AIRWAYS AND PLEURA: Patent central airways.  Partially loculated   right pleural effusion. Right middle and lower lung opacity. Left basilar   subsegmental atelectasis.    MEDIASTINUM AND BRIDGETT: Scattered subcentimeter mediastinal lymph nodes. No   lymphadenopathy.    VESSELS: Atheromatous disease of the aorta.    HEART: Heart size is mildly enlarged. No pericardial effusion. Left chest   wall pacemaker with leads terminating the right atrium and right   ventricle.    CHEST WALL AND LOWER NECK: Within normal limits.    VISUALIZED UPPER ABDOMEN: Within normal limits.    BONES: Degenerative osseous changes. Age-indeterminate loss of height of   L1 vertebral body.    IMPRESSION:     Partially loculated right pleural effusion. Right middle and lower lung   opacity which may represent pneumonia.    Recommend follow-up to resolution.    < end of copied text >      ASSESSMENT AND RECOMMENDATION:    69 year old male with history of HTN, HLD, MORRO (on BiPAP at home and continuous home O2 2L), AFib, and recent admission for VT s/p failed VT ablation and subsequent ICD placement / sotalol load presents as transfer from [facility] for syncope. Being loaded with amiodarone. Pulmonary consulted for COPD management. CT chest shows RLL loculated effusion. No signs of symptoms of pneumonia/infection. CXR appears similar on this admission from last. Suspect this is a chronic effusion in setting of volume overloaded.     COPD -  Has some expiratory wheezing although he appears fluid overloaded (edema of lower extremity, elevated pBNP)  Consider increasing diuresis with IV furosemide  Continue Symbicort, Spiriva, and albuterol nebulizer Q4H PRN    MORRO -   Does not recall his settings but 10/5 likely not sufficient   Increase empirically to 12/ and on Monday reach out to his pulmonologist to determine correct setting    Pleural effusion -   Volume overloaded - diurese patient  Will need repeat imaging as outpatient for follow up once more euvolemic    Harry Brower MD  Pulmonary and Critical Care Fellow  [phone]

## 2019-08-10 NOTE — PROGRESS NOTE ADULT - SUBJECTIVE AND OBJECTIVE BOX
====================  CCU MIDNIGHT ROUNDS  ====================    ALICE JUNIOR  [id number]  Patient is a 69y old  Male who presents with a chief complaint of episodes of VT shock ICD (10 Aug 2019 18:41)    ====================  NEW EVENTS:  ====================        ====================  VITALS (Last 12 hrs):  ====================    T(C): 36.8 (08-10-19 @ 17:00), Max: 36.8 (08-10-19 @ 17:00)  T(F): 98.3 (08-10-19 @ 17:00), Max: 98.3 (08-10-19 @ 17:00)  HR: 79 (08-10-19 @ 21:00) (73 - 83)  BP: 113/66 (08-10-19 @ 21:00) (96/60 - 122/58)  BP(mean): 77 (08-10-19 @ 21:00) (70 - 88)  ABP: --  ABP(mean): --  RR: 21 (08-10-19 @ 21:00) (16 - 25)  SpO2: 94% (08-10-19 @ 21:00) (91% - 96%)  Wt(kg): --  CVP(mm Hg): --  CVP(cm H2O): --  CO: --  CI: --  PA: --  PA(mean): --  PCWP: --  SVR: --  PVR: --    I&O's Summary    09 Aug 2019 07:01  -  10 Aug 2019 07:00  --------------------------------------------------------  IN: 493.2 mL / OUT: 1150 mL / NET: -656.8 mL    10 Aug 2019 07:01  -  10 Aug 2019 21:17  --------------------------------------------------------  IN: 480 mL / OUT: 2250 mL / NET: -1770 mL            ====================  NEW LABS:  ====================                          10.4   10.6  )-----------( 333      ( 10 Aug 2019 12:14 )             35.3     08-10    137  |  96  |  16  ----------------------------<  138<H>  4.6   |  31  |  0.89    Ca    8.9      10 Aug 2019 05:23  Phos  2.9     08-10  Mg     2.2     08-10    TPro  6.9  /  Alb  2.4<L>  /  TBili  0.3  /  DBili  x   /  AST  24  /  ALT  16  /  AlkPhos  132<H>  08-10    PT/INR - ( 09 Aug 2019 16:28 )   PT: 19.8 sec;   INR: 1.71 ratio         PTT - ( 09 Aug 2019 16:28 )  PTT:34.3 sec ====================  CCU MIDNIGHT ROUNDS  ====================    ALICE JUNIOR  [id number]  Patient is a 69y old  Male who presents with a chief complaint of episodes of VT shock ICD (10 Aug 2019 18:41)    ====================  NEW EVENTS:  ====================  Patient currently being Amio loaded for VT. Sotalol d/nubia and patient started on Lopressor.       ====================  VITALS (Last 12 hrs):  ====================    T(C): 36.8 (08-10-19 @ 17:00), Max: 36.8 (08-10-19 @ 17:00)  T(F): 98.3 (08-10-19 @ 17:00), Max: 98.3 (08-10-19 @ 17:00)  HR: 79 (08-10-19 @ 21:00) (73 - 83)  BP: 113/66 (08-10-19 @ 21:00) (96/60 - 122/58)  BP(mean): 77 (08-10-19 @ 21:00) (70 - 88)  ABP: --  ABP(mean): --  RR: 21 (08-10-19 @ 21:00) (16 - 25)  SpO2: 94% (08-10-19 @ 21:00) (91% - 96%)  Wt(kg): --  CVP(mm Hg): --  CVP(cm H2O): --  CO: --  CI: --  PA: --  PA(mean): --  PCWP: --  SVR: --  PVR: --    I&O's Summary    09 Aug 2019 07:01  -  10 Aug 2019 07:00  --------------------------------------------------------  IN: 493.2 mL / OUT: 1150 mL / NET: -656.8 mL    10 Aug 2019 07:01  -  10 Aug 2019 21:17  --------------------------------------------------------  IN: 480 mL / OUT: 2250 mL / NET: -1770 mL            ====================  NEW LABS:  ====================                          10.4   10.6  )-----------( 333      ( 10 Aug 2019 12:14 )             35.3     08-10    137  |  96  |  16  ----------------------------<  138<H>  4.6   |  31  |  0.89    Ca    8.9      10 Aug 2019 05:23  Phos  2.9     08-10  Mg     2.2     08-10    TPro  6.9  /  Alb  2.4<L>  /  TBili  0.3  /  DBili  x   /  AST  24  /  ALT  16  /  AlkPhos  132<H>  08-10    PT/INR - ( 09 Aug 2019 16:28 )   PT: 19.8 sec;   INR: 1.71 ratio         PTT - ( 09 Aug 2019 16:28 )  PTT:34.3 sec

## 2019-08-10 NOTE — PROGRESS NOTE ADULT - SUBJECTIVE AND OBJECTIVE BOX
INTERVAL HPI/OVERNIGHT EVENTS: patient appears less short of breath. no chest pain, no palpitations.    MEDICATIONS  (STANDING):  amiodarone    Tablet   Oral   amiodarone    Tablet 400 milliGRAM(s) Oral every 8 hours  apixaban 5 milliGRAM(s) Oral two times a day  atorvastatin 40 milliGRAM(s) Oral at bedtime  buDESOnide 160 MICROgram(s)/formoterol 4.5 MICROgram(s) Inhaler 2 Puff(s) Inhalation two times a day  chlorhexidine 2% Cloths 1 Application(s) Topical <User Schedule>  docusate sodium 100 milliGRAM(s) Oral three times a day  finasteride 5 milliGRAM(s) Oral daily  furosemide   Injectable 40 milliGRAM(s) IV Push daily  metoprolol tartrate 25 milliGRAM(s) Oral two times a day  pantoprazole    Tablet 40 milliGRAM(s) Oral before breakfast  senna 2 Tablet(s) Oral at bedtime  tamsulosin 0.4 milliGRAM(s) Oral at bedtime  tiotropium 18 MICROgram(s) Capsule 1 Capsule(s) Inhalation daily    MEDICATIONS  (PRN):  nystatin Powder 1 Application(s) Topical two times a day PRN as needed      Allergies    No Known Allergies    Intolerances      ROS:  General: Pt denies recent weight loss/fever/chills    Neurological: denies numbness or  sensation loss    Cardiovascular: denies chest pain/palpitations/leg edema    Respiratory and Thorax: denies SOB/cough/wheezing    Gastrointestinal: denies abdominal pain/diarrhea/constipation/bloody stool    Genitourinary: denies urinary frequency/urgency/ dysuria    Musculoskeletal: denies joint pain or swelling, denies restricted motion    Hematologic: denies abnormal bleeding  	    	  	    		        	    	            Vital Signs Last 24 Hrs  T(C): 36.8 (10 Aug 2019 17:00), Max: 36.9 (10 Aug 2019 08:00)  T(F): 98.3 (10 Aug 2019 17:00), Max: 98.4 (10 Aug 2019 08:00)  HR: 77 (10 Aug 2019 18:00) (64 - 104)  BP: 96/60 (10 Aug 2019 18:00) (96/60 - 122/58)  BP(mean): 70 (10 Aug 2019 18:00) (70 - 88)  RR: 20 (10 Aug 2019 18:00) (12 - 24)  SpO2: 93% (10 Aug 2019 18:00) (90% - 97%)  Daily     Daily Weight in k.6 (10 Aug 2019 06:00)     @ 07:01  -  08-10 @ 07:00  --------------------------------------------------------  IN: 493.2 mL / OUT: 1150 mL / NET: -656.8 mL    08-10 @ 07:01  -  08-10 @ 18:41  --------------------------------------------------------  IN: 480 mL / OUT: 2250 mL / NET: -1770 mL      Physical Exam:    WDWN male  JVD mild increase  cor irregularly irregular  lung clear ( improved since yesterday)  abd soft   \ext no edema      LABS:                        10.4   10.6  )-----------( 333      ( 10 Aug 2019 12:14 )             35.3     08-10    137  |  96  |  16  ----------------------------<  138<H>  4.6   |  31  |  0.89    Ca    8.9      10 Aug 2019 05:23  Phos  2.9     08-10  Mg     2.2     08-10    TPro  6.9  /  Alb  2.4<L>  /  TBili  0.3  /  DBili  x   /  AST  24  /  ALT  16  /  AlkPhos  132<H>  08-10    PT/INR - ( 09 Aug 2019 16:28 )   PT: 19.8 sec;   INR: 1.71 ratio         PTT - ( 09 Aug 2019 16:28 )  PTT:34.3 sec  Urinalysis Basic - ( 09 Aug 2019 18:28 )    Color: Yellow / Appearance: Slightly Turbid / S.029 / pH: x  Gluc: x / Ketone: Negative  / Bili: Small / Urobili: 3 mg/dL   Blood: x / Protein: 30 mg/dL / Nitrite: Negative   Leuk Esterase: Negative / RBC: 2 /hpf / WBC 1 /HPF   Sq Epi: x / Non Sq Epi: 1 /hpf / Bacteria: Negative        RADIOLOGY & ADDITIONAL TESTS:    TELE:    EKG:

## 2019-08-10 NOTE — PROGRESS NOTE ADULT - ASSESSMENT
Patient is a 70 yo M with PMHx of HTN, HLD, Gout, MORRO (on CPAP, Home O2 2L), AFib, recently hospital admission for VT, s/p failed VT ablation now s/p ICD and sotalol load.  Now transferred from [facility] for syncope.  In [facility] ED ICD interrogated and found to have multiple episodes of VT.  Admitted for ICD firing. Patient is a 70 yo M with PMHx of HTN, HLD, Gout, MORRO (on CPAP, Home O2 2L), AFib, non-ischemic Bi-V CHF recently hospital admission for VT (s/p failed VT ablation now s/p ICD), transferred from [place] for syncope, found to have multiple episodes of VT, admitted to the CCU for ICD defibrillation    #VTach  - patient with recent CCU admission (d/nubia on 7/17) for VT, s/p failed VT ablation on 7/12 with spontaneous resolution of VT  - device interrogation on admission revealed VT which resulted in a total of 6 shocks with each shock terminating VT but with recurrent VT  - currently being loaded with Amio with no further episodes of VT  - continue Metoprolol 25 BID     #Non-ischemic Bi-V CHF   - TTE 8/9 showing moderate segmental left ventricular systolic dysfunction with TTE on 7/6 showing moderate RV dilation w/ mildly reduced function   - s/p 1 dose of IV Lasix 40 with good response. Continue for now   - c/w Metoprolol   - no ACE/ARB in setting of soft BPs     #AFib  - currently in AFib, however rate controlled  - continue Eliquis for AC  - cont Amio and Lopressor 25 BID     #COPD  - CT revealed right middle and lower lung opacity, however patient currently afebrile, with no signs of an infection   - will observe off of abx at this time   - continue spiriva and symicort  - Duoneb q4 PRN  - continue BiPAP 12/8 qhs   - currently being followed by Pulm

## 2019-08-10 NOTE — PROGRESS NOTE ADULT - SUBJECTIVE AND OBJECTIVE BOX
Subjective: pt seen and examined, no complaints, ROS - .   pt placed on Bipap for MORRO,   Amio gtt converted to PO load.     ALBUTerol    90 MICROgram(s) HFA Inhaler 2 Puff(s) Inhalation every 6 hours  amiodarone    Tablet   Oral   amiodarone    Tablet 400 milliGRAM(s) Oral every 8 hours  apixaban 5 milliGRAM(s) Oral two times a day  atorvastatin 40 milliGRAM(s) Oral at bedtime  buDESOnide 160 MICROgram(s)/formoterol 4.5 MICROgram(s) Inhaler 2 Puff(s) Inhalation two times a day  chlorhexidine 2% Cloths 1 Application(s) Topical <User Schedule>  docusate sodium 100 milliGRAM(s) Oral three times a day  finasteride 5 milliGRAM(s) Oral daily  furosemide    Tablet 40 milliGRAM(s) Oral daily  metoprolol tartrate 25 milliGRAM(s) Oral two times a day  pantoprazole    Tablet 40 milliGRAM(s) Oral before breakfast  senna 2 Tablet(s) Oral at bedtime  tamsulosin 0.4 milliGRAM(s) Oral at bedtime  tiotropium 18 MICROgram(s) Capsule 1 Capsule(s) Inhalation daily                            11.5   10.2  )-----------( 345      ( 09 Aug 2019 16:28 )             39.4       Hemoglobin: 11.5 g/dL (08-09 @ 16:28)      08-09    138  |  92<L>  |  17  ----------------------------<  97  3.5   |  32<H>  |  0.88    Ca    8.8      09 Aug 2019 16:28    TPro  7.1  /  Alb  3.0<L>  /  TBili  0.5  /  DBili  x   /  AST  21  /  ALT  16  /  AlkPhos  140<H>  08-09    Creatinine Trend: 0.88<--, 1.00<--, 0.89<--, 0.85<--, 0.91<--, 0.83<--    COAGS: PT/INR - ( 09 Aug 2019 16:28 )   PT: 19.8 sec;   INR: 1.71 ratio         PTT - ( 09 Aug 2019 16:28 )  PTT:34.3 sec          T(C): 36.4 (08-09-19 @ 19:00), Max: 36.9 (08-09-19 @ 17:30)  HR: 69 (08-10-19 @ 02:00) (67 - 104)  BP: 111/59 (08-10-19 @ 02:00) (108/52 - 125/68)  RR: 18 (08-10-19 @ 02:00) (12 - 24)  SpO2: 95% (08-10-19 @ 02:00) (87% - 98%)  Wt(kg): --    I&O's Summary    09 Aug 2019 07:01  -  10 Aug 2019 02:55  --------------------------------------------------------  IN: 373.2 mL / OUT: 750 mL / NET: -376.8 mL    Appearance: Normal, NAD  HEAD:  Atraumatic, Normocephalic  EYES:  PERRLA, conjunctiva and sclera clear  NECK: Supple, No JVD  CHEST/LUNG: expiratory wheezing on right >left  HEART: Normal S1, S2. No murmurs, rubs, or gallops  ABDOMEN: + Bowel sounds, Soft, NT, ND   EXTREMITIES:  2+ Peripheral Pulses, No clubbing, cyanosis, or edema  NEUROLOGY: non-focal, aaox3      TELEMETRY: 	Afib , Paced     ECG:  	  RADIOLOGY:  < from: Xray Chest 1 View AP/PA (08.09.19 @ 15:21) >  Comparison: 7/16/2019.     The mediastinal cardiac silhouette is unremarkable. There is an ACID. .    Pacer pad overlies right chest wall. Evaluation is limited by patient   rotation to the right. Opacity at the right lung base may represent   atelectasis, effusion, or superimposed soft tissues in this rotated   patient. Clear left lung.    No acute osseous finding.     IMPRESSION:    < end of copied text >    DIAGNOSTIC TESTING:  [ ] Echocardiogram:  < from: TTE with Doppler (w/Cont) (08.09.19 @ 17:39) >  Conclusions:  1. Mitral annular calcification, otherwise normal mitral  valve. Mild mitral regurgitation.  2. Calcified trileaflet aortic valve with normal opening.  Minimal aortic regurgitation.  3. Mild concentric left ventricular hypertrophy.  4. Endocardial visualization enhanced with intravenous  injection of Ultrasonic Enhancing Agent (Definity).  Moderate segmental left ventricular systolic dysfunction.  No left ventricular thrombus. The mid anterior septum, the  apical inferior wall, the apical anterior wall, the apical  septum, the apical lateral wall, the mid anterior wall, and  the mid inferoseptum are hypokinetic.  5. Mild diastolic dysfunction (Stage I).  6. The right ventricle is not well visualized.  7. Normal pericardium with no pericardial effusion.  *** Compared with echocardiogram of 7/6/2019, no  significant changes noted.  ------------------------------------------------------------------------  Confirmed on  8/9/2019 - 20:35:38 by Jose Mai M.D.  ------------------------------------------------------------------------    < end of copied text >    [ ]  Catheterization: < from: Cardiac Cath Lab - Adult (07.08.19 @ 15:35) >  HISTORY: There was no prior cardiac history. The patient has hypertension  and medication-treated dyslipidemia.  PROCEDURE:  --  Left coronary angiography.  --  Right coronary angiography.  --  Sonosite - Diagnostic.  --  Hemostasis with Angioseal.  TECHNIQUE: The risks and alternatives of the procedures and conscious  sedation were explained to the patient and informed consent was obtained.  Cardiac catheterization performed electively.  Local anesthetic given. Right femoral artery access. Left coronary artery  angiography. The vessel was injected utilizing a catheter. Right coronary  artery angiography. The vessel was injected utilizing a catheter. Sonosite  - Diagnostic. Hemostasis with Angioseal. RADIATION EXPOSURE: 2.1 min.  CONTRAST GIVEN: Omnipaque 36 ml.  MEDICATIONS GIVEN: Midazolam, 1 mg, IV. Fentanyl, 25 mcg, IV.  CORONARY VESSELS: The coronary circulation is left dominant.  LM:   --  LM: Normal.  LAD:   --  LAD: Normal.  CX:   --  Circumflex: Normal.  RCA:   --  RCA: Normal.  COMPLICATIONS: There were no complications.  DIAGNOSTIC RECOMMENDATIONS: Thepatient should continue with the present  medications.  Prepared and signed by  Tristan Cobb M.D.  Signed 07/08/2019 17:16:14    < end of copied text >    [ ] Stress Test:    OTHER: 	        ASSESSMENT/PLAN: 	69y Male   w/ pmhx of HTN, HLD, Gout, MORRO (on CPAP, Home O2 2L), afib, recently admitted for VT, s/p failed VT ablation with new ICD now with VT.      AMio load ( s/p 24 hr IV) , now PO    GI / DVT prophylaxis.   keep K>4, mag >2.0   ECHO noted   sotalol d/c , started on Lopressor per EPS input.   A/C with eliquis , for Afib .   ASA, statin   BIPAP / bronchodilator support   pt started on ABX,-- all cultures neg, WBC wnl , chest xray with ? collapse RLL. ? need to continue ABX   D/w on AM rounds

## 2019-08-11 LAB
ALBUMIN SERPL ELPH-MCNC: 2.7 G/DL — LOW (ref 3.3–5)
ALBUMIN SERPL ELPH-MCNC: 2.9 G/DL — LOW (ref 3.3–5)
ALBUMIN SERPL ELPH-MCNC: 3.1 G/DL — LOW (ref 3.3–5)
ALP SERPL-CCNC: 122 U/L — HIGH (ref 40–120)
ALP SERPL-CCNC: 124 U/L — HIGH (ref 40–120)
ALP SERPL-CCNC: 128 U/L — HIGH (ref 40–120)
ALT FLD-CCNC: 14 U/L — SIGNIFICANT CHANGE UP (ref 10–45)
ALT FLD-CCNC: 14 U/L — SIGNIFICANT CHANGE UP (ref 10–45)
ALT FLD-CCNC: 15 U/L — SIGNIFICANT CHANGE UP (ref 10–45)
ANION GAP SERPL CALC-SCNC: 10 MMOL/L — SIGNIFICANT CHANGE UP (ref 5–17)
ANION GAP SERPL CALC-SCNC: 13 MMOL/L — SIGNIFICANT CHANGE UP (ref 5–17)
ANION GAP SERPL CALC-SCNC: 9 MMOL/L — SIGNIFICANT CHANGE UP (ref 5–17)
AST SERPL-CCNC: 12 U/L — SIGNIFICANT CHANGE UP (ref 10–40)
AST SERPL-CCNC: 18 U/L — SIGNIFICANT CHANGE UP (ref 10–40)
AST SERPL-CCNC: 18 U/L — SIGNIFICANT CHANGE UP (ref 10–40)
BASOPHILS # BLD AUTO: 0 K/UL — SIGNIFICANT CHANGE UP (ref 0–0.2)
BASOPHILS NFR BLD AUTO: 0.2 % — SIGNIFICANT CHANGE UP (ref 0–2)
BILIRUB SERPL-MCNC: 0.3 MG/DL — SIGNIFICANT CHANGE UP (ref 0.2–1.2)
BUN SERPL-MCNC: 13 MG/DL — SIGNIFICANT CHANGE UP (ref 7–23)
BUN SERPL-MCNC: 14 MG/DL — SIGNIFICANT CHANGE UP (ref 7–23)
BUN SERPL-MCNC: 17 MG/DL — SIGNIFICANT CHANGE UP (ref 7–23)
CALCIUM SERPL-MCNC: 8.8 MG/DL — SIGNIFICANT CHANGE UP (ref 8.4–10.5)
CALCIUM SERPL-MCNC: 8.9 MG/DL — SIGNIFICANT CHANGE UP (ref 8.4–10.5)
CALCIUM SERPL-MCNC: 9.7 MG/DL — SIGNIFICANT CHANGE UP (ref 8.4–10.5)
CHLORIDE SERPL-SCNC: 91 MMOL/L — LOW (ref 96–108)
CHLORIDE SERPL-SCNC: 91 MMOL/L — LOW (ref 96–108)
CHLORIDE SERPL-SCNC: 92 MMOL/L — LOW (ref 96–108)
CO2 SERPL-SCNC: 34 MMOL/L — HIGH (ref 22–31)
CO2 SERPL-SCNC: 36 MMOL/L — HIGH (ref 22–31)
CO2 SERPL-SCNC: 38 MMOL/L — HIGH (ref 22–31)
CREAT SERPL-MCNC: 0.86 MG/DL — SIGNIFICANT CHANGE UP (ref 0.5–1.3)
CREAT SERPL-MCNC: 0.88 MG/DL — SIGNIFICANT CHANGE UP (ref 0.5–1.3)
CREAT SERPL-MCNC: 1.02 MG/DL — SIGNIFICANT CHANGE UP (ref 0.5–1.3)
EOSINOPHIL # BLD AUTO: 0.2 K/UL — SIGNIFICANT CHANGE UP (ref 0–0.5)
EOSINOPHIL NFR BLD AUTO: 1.6 % — SIGNIFICANT CHANGE UP (ref 0–6)
GLUCOSE SERPL-MCNC: 104 MG/DL — HIGH (ref 70–99)
GLUCOSE SERPL-MCNC: 118 MG/DL — HIGH (ref 70–99)
GLUCOSE SERPL-MCNC: 126 MG/DL — HIGH (ref 70–99)
HCT VFR BLD CALC: 36.1 % — LOW (ref 39–50)
HGB BLD-MCNC: 10.8 G/DL — LOW (ref 13–17)
LYMPHOCYTES # BLD AUTO: 1.4 K/UL — SIGNIFICANT CHANGE UP (ref 1–3.3)
LYMPHOCYTES # BLD AUTO: 14.2 % — SIGNIFICANT CHANGE UP (ref 13–44)
MAGNESIUM SERPL-MCNC: 1.7 MG/DL — SIGNIFICANT CHANGE UP (ref 1.6–2.6)
MAGNESIUM SERPL-MCNC: 2 MG/DL — SIGNIFICANT CHANGE UP (ref 1.6–2.6)
MAGNESIUM SERPL-MCNC: 2.1 MG/DL — SIGNIFICANT CHANGE UP (ref 1.6–2.6)
MCHC RBC-ENTMCNC: 25.1 PG — LOW (ref 27–34)
MCHC RBC-ENTMCNC: 30 GM/DL — LOW (ref 32–36)
MCV RBC AUTO: 83.7 FL — SIGNIFICANT CHANGE UP (ref 80–100)
MONOCYTES # BLD AUTO: 1 K/UL — HIGH (ref 0–0.9)
MONOCYTES NFR BLD AUTO: 10.3 % — SIGNIFICANT CHANGE UP (ref 2–14)
NEUTROPHILS # BLD AUTO: 7.5 K/UL — HIGH (ref 1.8–7.4)
NEUTROPHILS NFR BLD AUTO: 73.7 % — SIGNIFICANT CHANGE UP (ref 43–77)
PHOSPHATE SERPL-MCNC: 2.7 MG/DL — SIGNIFICANT CHANGE UP (ref 2.5–4.5)
PHOSPHATE SERPL-MCNC: 2.9 MG/DL — SIGNIFICANT CHANGE UP (ref 2.5–4.5)
PHOSPHATE SERPL-MCNC: 2.9 MG/DL — SIGNIFICANT CHANGE UP (ref 2.5–4.5)
PLATELET # BLD AUTO: 343 K/UL — SIGNIFICANT CHANGE UP (ref 150–400)
POTASSIUM SERPL-MCNC: 3.6 MMOL/L — SIGNIFICANT CHANGE UP (ref 3.5–5.3)
POTASSIUM SERPL-MCNC: 3.9 MMOL/L — SIGNIFICANT CHANGE UP (ref 3.5–5.3)
POTASSIUM SERPL-MCNC: 4.2 MMOL/L — SIGNIFICANT CHANGE UP (ref 3.5–5.3)
POTASSIUM SERPL-SCNC: 3.6 MMOL/L — SIGNIFICANT CHANGE UP (ref 3.5–5.3)
POTASSIUM SERPL-SCNC: 3.9 MMOL/L — SIGNIFICANT CHANGE UP (ref 3.5–5.3)
POTASSIUM SERPL-SCNC: 4.2 MMOL/L — SIGNIFICANT CHANGE UP (ref 3.5–5.3)
PROT SERPL-MCNC: 6.8 G/DL — SIGNIFICANT CHANGE UP (ref 6–8.3)
PROT SERPL-MCNC: 7.1 G/DL — SIGNIFICANT CHANGE UP (ref 6–8.3)
PROT SERPL-MCNC: 7.5 G/DL — SIGNIFICANT CHANGE UP (ref 6–8.3)
RAPID RVP RESULT: SIGNIFICANT CHANGE UP
RBC # BLD: 4.31 M/UL — SIGNIFICANT CHANGE UP (ref 4.2–5.8)
RBC # FLD: 14.2 % — SIGNIFICANT CHANGE UP (ref 10.3–14.5)
SODIUM SERPL-SCNC: 136 MMOL/L — SIGNIFICANT CHANGE UP (ref 135–145)
SODIUM SERPL-SCNC: 139 MMOL/L — SIGNIFICANT CHANGE UP (ref 135–145)
SODIUM SERPL-SCNC: 139 MMOL/L — SIGNIFICANT CHANGE UP (ref 135–145)
WBC # BLD: 10.1 K/UL — SIGNIFICANT CHANGE UP (ref 3.8–10.5)
WBC # FLD AUTO: 10.1 K/UL — SIGNIFICANT CHANGE UP (ref 3.8–10.5)

## 2019-08-11 PROCEDURE — 99291 CRITICAL CARE FIRST HOUR: CPT

## 2019-08-11 PROCEDURE — 93010 ELECTROCARDIOGRAM REPORT: CPT

## 2019-08-11 RX ORDER — POTASSIUM CHLORIDE 20 MEQ
20 PACKET (EA) ORAL ONCE
Refills: 0 | Status: COMPLETED | OUTPATIENT
Start: 2019-08-11 | End: 2019-08-11

## 2019-08-11 RX ORDER — METOPROLOL TARTRATE 50 MG
25 TABLET ORAL EVERY 8 HOURS
Refills: 0 | Status: DISCONTINUED | OUTPATIENT
Start: 2019-08-11 | End: 2019-08-14

## 2019-08-11 RX ORDER — AMIODARONE HYDROCHLORIDE 400 MG/1
150 TABLET ORAL ONCE
Refills: 0 | Status: COMPLETED | OUTPATIENT
Start: 2019-08-11 | End: 2019-08-11

## 2019-08-11 RX ORDER — MAGNESIUM SULFATE 500 MG/ML
2 VIAL (ML) INJECTION ONCE
Refills: 0 | Status: COMPLETED | OUTPATIENT
Start: 2019-08-11 | End: 2019-08-11

## 2019-08-11 RX ORDER — POTASSIUM CHLORIDE 20 MEQ
40 PACKET (EA) ORAL ONCE
Refills: 0 | Status: COMPLETED | OUTPATIENT
Start: 2019-08-11 | End: 2019-08-11

## 2019-08-11 RX ADMIN — Medication 25 MILLIGRAM(S): at 15:45

## 2019-08-11 RX ADMIN — TAMSULOSIN HYDROCHLORIDE 0.4 MILLIGRAM(S): 0.4 CAPSULE ORAL at 21:46

## 2019-08-11 RX ADMIN — BUDESONIDE AND FORMOTEROL FUMARATE DIHYDRATE 2 PUFF(S): 160; 4.5 AEROSOL RESPIRATORY (INHALATION) at 07:34

## 2019-08-11 RX ADMIN — ATORVASTATIN CALCIUM 40 MILLIGRAM(S): 80 TABLET, FILM COATED ORAL at 21:46

## 2019-08-11 RX ADMIN — AMIODARONE HYDROCHLORIDE 400 MILLIGRAM(S): 400 TABLET ORAL at 09:28

## 2019-08-11 RX ADMIN — TIOTROPIUM BROMIDE 1 CAPSULE(S): 18 CAPSULE ORAL; RESPIRATORY (INHALATION) at 11:32

## 2019-08-11 RX ADMIN — Medication 20 MILLIEQUIVALENT(S): at 11:27

## 2019-08-11 RX ADMIN — Medication 25 MILLIGRAM(S): at 06:09

## 2019-08-11 RX ADMIN — NYSTATIN CREAM 1 APPLICATION(S): 100000 CREAM TOPICAL at 06:10

## 2019-08-11 RX ADMIN — Medication 25 MILLIGRAM(S): at 21:46

## 2019-08-11 RX ADMIN — CHLORHEXIDINE GLUCONATE 1 APPLICATION(S): 213 SOLUTION TOPICAL at 06:09

## 2019-08-11 RX ADMIN — APIXABAN 5 MILLIGRAM(S): 2.5 TABLET, FILM COATED ORAL at 17:25

## 2019-08-11 RX ADMIN — APIXABAN 5 MILLIGRAM(S): 2.5 TABLET, FILM COATED ORAL at 06:09

## 2019-08-11 RX ADMIN — Medication 40 MILLIGRAM(S): at 06:09

## 2019-08-11 RX ADMIN — AMIODARONE HYDROCHLORIDE 400 MILLIGRAM(S): 400 TABLET ORAL at 02:51

## 2019-08-11 RX ADMIN — AMIODARONE HYDROCHLORIDE 600 MILLIGRAM(S): 400 TABLET ORAL at 09:39

## 2019-08-11 RX ADMIN — Medication 20 MILLIEQUIVALENT(S): at 06:09

## 2019-08-11 RX ADMIN — AMIODARONE HYDROCHLORIDE 400 MILLIGRAM(S): 400 TABLET ORAL at 17:25

## 2019-08-11 RX ADMIN — Medication 40 MILLIEQUIVALENT(S): at 10:35

## 2019-08-11 RX ADMIN — BUDESONIDE AND FORMOTEROL FUMARATE DIHYDRATE 2 PUFF(S): 160; 4.5 AEROSOL RESPIRATORY (INHALATION) at 17:27

## 2019-08-11 RX ADMIN — PANTOPRAZOLE SODIUM 40 MILLIGRAM(S): 20 TABLET, DELAYED RELEASE ORAL at 06:09

## 2019-08-11 RX ADMIN — FINASTERIDE 5 MILLIGRAM(S): 5 TABLET, FILM COATED ORAL at 11:27

## 2019-08-11 RX ADMIN — Medication 50 GRAM(S): at 06:09

## 2019-08-11 NOTE — PROGRESS NOTE ADULT - ATTENDING COMMENTS
Patient with known history of biventricular systolic heart failure, nonischemic cardiomyopathy, status post recent VT ablation, now presents with multiple ICD shocks.  6 shocks delivered for VT after failure of ATP to abort rhythm.  On sotalol. QTc at baseline. Transitioned sotalol to metoprolol while loading with amiodarone.  Hypokalemia noted - will replete potassium.    Amiodarone loading per EP.

## 2019-08-11 NOTE — PROGRESS NOTE ADULT - SUBJECTIVE AND OBJECTIVE BOX
INTERVAL HPI/OVERNIGHT EVENTS: patient noted to have multiple short bursts of ninsustained VT, given additional amiodarone 150 mg IV , k repleted, Given lasix this am, breathing comfortably.      MEDICATIONS  (STANDING):  amiodarone    Tablet   Oral   amiodarone    Tablet 400 milliGRAM(s) Oral every 8 hours  apixaban 5 milliGRAM(s) Oral two times a day  atorvastatin 40 milliGRAM(s) Oral at bedtime  buDESOnide 160 MICROgram(s)/formoterol 4.5 MICROgram(s) Inhaler 2 Puff(s) Inhalation two times a day  chlorhexidine 2% Cloths 1 Application(s) Topical <User Schedule>  docusate sodium 100 milliGRAM(s) Oral three times a day  finasteride 5 milliGRAM(s) Oral daily  furosemide   Injectable 40 milliGRAM(s) IV Push daily  metoprolol tartrate 25 milliGRAM(s) Oral two times a day  pantoprazole    Tablet 40 milliGRAM(s) Oral before breakfast  senna 2 Tablet(s) Oral at bedtime  tamsulosin 0.4 milliGRAM(s) Oral at bedtime  tiotropium 18 MICROgram(s) Capsule 1 Capsule(s) Inhalation daily    MEDICATIONS  (PRN):  ALBUTerol/ipratropium for Nebulization 3 milliLiter(s) Nebulizer every 4 hours PRN Shortness of Breath and/or Wheezing  nystatin Powder 1 Application(s) Topical two times a day PRN as needed      Allergies    No Known Allergies    Intolerances      ROS:  General: Pt denies recent weight loss/fever/chills    Neurological: denies numbness or  sensation loss    Cardiovascular: denies chest pain/palpitations/leg edema    Respiratory and Thorax: denies SOB/cough/wheezing    Gastrointestinal: denies abdominal pain/diarrhea/constipation/bloody stool    Genitourinary: denies urinary frequency/urgency/ dysuria    Musculoskeletal: denies joint pain or swelling, denies restricted motion    Hematologic: denies abnormal bleeding  	    	  	    		        	    	            Vital Signs Last 24 Hrs  T(C): 36.5 (11 Aug 2019 09:42), Max: 36.8 (10 Aug 2019 17:00)  T(F): 97.7 (11 Aug 2019 09:42), Max: 98.3 (10 Aug 2019 17:00)  HR: 79 (11 Aug 2019 12:00) (74 - 96)  BP: 93/54 (11 Aug 2019 12:00) (93/54 - 126/77)  BP(mean): 59 (11 Aug 2019 12:00) (59 - 95)  RR: 21 (11 Aug 2019 12:00) (14 - 28)  SpO2: 94% (11 Aug 2019 12:00) (91% - 95%)  Daily     Daily     08-10 @ 07:01  -   @ 07:00  --------------------------------------------------------  IN: 770 mL / OUT: 3700 mL / NET: -2930 mL     @ 07:  -   @ 12:20  --------------------------------------------------------  IN: 340 mL / OUT: 800 mL / NET: -460 mL      Physical Exam:    wdwn male  no JVD  cor IRRegirreg  lung occ wheeze  abd soft  ext no edema        LABS:                        10.8   10.1  )-----------( 343      ( 11 Aug 2019 03:04 )             36.1     11    139  |  91<L>  |  13  ----------------------------<  126<H>  3.6   |  38<H>  |  0.86    Ca    9.7      11 Aug 2019 09:50  Phos  2.7       Mg     2.1         TPro  7.5  /  Alb  3.1<L>  /  TBili  0.3  /  DBili  x   /  AST  12  /  ALT  14  /  AlkPhos  128<H>  11    PT/INR - ( 09 Aug 2019 16:28 )   PT: 19.8 sec;   INR: 1.71 ratio         PTT - ( 09 Aug 2019 16:28 )  PTT:34.3 sec  Urinalysis Basic - ( 09 Aug 2019 18:28 )    Color: Yellow / Appearance: Slightly Turbid / S.029 / pH: x  Gluc: x / Ketone: Negative  / Bili: Small / Urobili: 3 mg/dL   Blood: x / Protein: 30 mg/dL / Nitrite: Negative   Leuk Esterase: Negative / RBC: 2 /hpf / WBC 1 /HPF   Sq Epi: x / Non Sq Epi: 1 /hpf / Bacteria: Negative        RADIOLOGY & ADDITIONAL TESTS:    TELE:    EKG:

## 2019-08-11 NOTE — PROGRESS NOTE ADULT - ASSESSMENT
74 year male with nonischemic CM ,CHF,COPD, VT persistent AFIB with recurrent VT on sotalol, Qt  467, mild hypokalemia, worsening respiratory status x 3 days iwth CT showing loculated effusion, possible infiltrate, received amiodarone, metoprolol and furosemide, Now with recurrent short bursts of VT, . respiratory status improved.

## 2019-08-11 NOTE — PHYSICAL THERAPY INITIAL EVALUATION ADULT - BALANCE TRAINING, PT EVAL
GOAL: Pt will increase static/ dynamic standing balance to Good- with Rollator to improve safety with functional activities in 4 weeks.

## 2019-08-11 NOTE — PROGRESS NOTE ADULT - ASSESSMENT
Patient is a 70 yo M with PMHx of HTN, HLD, Gout, MORRO (on CPAP, Home O2 2L), AFib, non-ischemic Bi-V CHF recently hospital admission for VT (s/p failed VT ablation now s/p ICD), transferred from [place] for syncope, found to have multiple episodes of VT, admitted to the CCU for ICD defibrillation    #VTach  - patient with recent CCU admission (d/nubia on 7/17) for VT, s/p failed VT ablation on 7/12 with spontaneous resolution of VT  - 6 shocks delivered for VT after failure of ATP to abort rhythm.  - Sotalol -> Metoprolol 25 mg TID. Cont Amio load with no further episodes of VT    #Non-ischemic Bi-V CHF   - TTE 8/9 showing moderate segmental left ventricular systolic dysfunction with TTE on 7/6 showing moderate RV dilation w/ mildly reduced function   - Cont IV Lasix 40 QD  - c/w Metoprolol   - no ACE/ARB in setting of soft BPs     #AFib  - currently in AFib, however rate controlled  - continue Eliquis for AC  - cont Amio and Lopressor 25 TID     #COPD  - CT revealed right middle and lower lung opacity, however patient currently afebrile, with no signs of an infection   - will observe off of abx at this time   - continue spiriva and symicort  - Duoneb q4 PRN  - continue BiPAP 12/8 qhs   - currently being followed by Pulm

## 2019-08-11 NOTE — PHYSICAL THERAPY INITIAL EVALUATION ADULT - PERTINENT HX OF CURRENT PROBLEM, REHAB EVAL
68 yo M transferred from [facility] for syncope.  In [facility] ED ICD interrogated and found to have multiple episodes of VT.  Admitted for ICD firing. CT Chest 8/10: Partially loculated right pleural effusion. Right middle and lower lung opacity which may represent pneumonia.

## 2019-08-11 NOTE — PHYSICAL THERAPY INITIAL EVALUATION ADULT - ADDITIONAL COMMENTS
pt lives in private home with spouse, 8 steps to enter through the front +HR, 5 steps through the back +HR. Prior to admission, pt was I with all functional mobility and ADLs with rollator.

## 2019-08-11 NOTE — PROGRESS NOTE ADULT - SUBJECTIVE AND OBJECTIVE BOX
====================  CCU MIDNIGHT ROUNDS  ====================    ALICE JUNIOR  [id number]  Patient is a 69y old  Male who presents with a chief complaint of episodes of VT shock ICD (11 Aug 2019 12:20)    ====================  SUMMARY: 68 y/o M w/ pmhx of HTN, HLD, Gout, MORRO (on CPAP, Home O2 2L), afib, recently admitted for VT, s/p failed VT ablation now s/p ICD and sotalol load.  Now transferred from [facility] for syncope.  In [facility] ED ICD interrogated and found to have multiple episodes of VT.  Admitted for ICD firing. (09 Aug 2019 17:43)  ====================      ====================  NEW EVENTS: Patient seen and examined at bedside. Vital signs stable. Patient denies headache, dizziness, chest/abd pain, worsening shortness of breath. ROS negative unless otherwise stated.   ====================        ====================  VITALS (Last 12 hrs):  ====================    T(C): 37 (08-11-19 @ 16:00), Max: 37 (08-11-19 @ 16:00)  T(F): 98.6 (08-11-19 @ 16:00), Max: 98.6 (08-11-19 @ 16:00)  HR: 82 (08-11-19 @ 21:00) (74 - 92)  BP: 103/55 (08-11-19 @ 21:00) (84/57 - 132/89)  BP(mean): 74 (08-11-19 @ 21:00) (59 - 103)  RR: 19 (08-11-19 @ 21:00) (15 - 29)  SpO2: 93% (08-11-19 @ 21:00) (91% - 94%)    I&O's Summary    10 Aug 2019 07:01  -  11 Aug 2019 07:00  --------------------------------------------------------  IN: 770 mL / OUT: 3700 mL / NET: -2930 mL    11 Aug 2019 07:01  -  11 Aug 2019 22:08  --------------------------------------------------------  IN: 877 mL / OUT: 950 mL / NET: -73 mL        ====================  NEW LABS:  ====================                          10.8   10.1  )-----------( 343      ( 11 Aug 2019 03:04 )             36.1     08-11    136  |  91<L>  |  17  ----------------------------<  118<H>  4.2   |  36<H>  |  1.02    Ca    8.9      11 Aug 2019 16:26  Phos  2.9     08-11  Mg     2.0     08-11    TPro  7.1  /  Alb  2.9<L>  /  TBili  0.3  /  DBili  x   /  AST  18  /  ALT  14  /  AlkPhos  124<H>  08-11

## 2019-08-11 NOTE — PHYSICAL THERAPY INITIAL EVALUATION ADULT - IMPAIRMENTS CONTRIBUTING IMPAIRED BED MOBILITY, REHAB EVAL
impaired balance/decreased flexibility/decreased endurance/impaired postural control/decreased strength

## 2019-08-11 NOTE — PROGRESS NOTE ADULT - SUBJECTIVE AND OBJECTIVE BOX
Admission date:  CHIEF COMPLAINT:  HPI:  68 y/o M w/ pmhx of HTN, HLD, Gout, MORRO (on CPAP, Home O2 2L), afib, recently admitted for VT, s/p failed VT ablation now s/p ICD and sotalol load.  Now transferred from [facility] for syncope.  In [facility] ED ICD interrogated and found to have multiple episodes of VT.  Admitted for ICD firing. (09 Aug 2019 17:43)    INTERVAL HISTORY: Patient seen and examined, denies CP, dyspnea, orthopnea, PND, palpitations and able to lay flat. NARD noted    REVIEW OF SYSTEMS:    CONSTITUTIONAL: No weakness, fevers or chills  EYES/ENT: No visual changes;  No vertigo or throat pain   NECK: No pain or stiffness  RESPIRATORY: No cough, wheezing, hemoptysis; No shortness of breath  CARDIOVASCULAR: No chest pain or palpitations  GASTROINTESTINAL: No abdominal or epigastric pain. No nausea, vomiting, or hematemesis; No diarrhea or constipation. No melena or hematochezia.  GENITOURINARY: No dysuria, frequency or hematuria  NEUROLOGICAL: No numbness or weakness  SKIN: No itching, rashes        MEDICATIONS  (STANDING):  amiodarone    Tablet   Oral   amiodarone    Tablet 400 milliGRAM(s) Oral every 8 hours  apixaban 5 milliGRAM(s) Oral two times a day  atorvastatin 40 milliGRAM(s) Oral at bedtime  buDESOnide 160 MICROgram(s)/formoterol 4.5 MICROgram(s) Inhaler 2 Puff(s) Inhalation two times a day  chlorhexidine 2% Cloths 1 Application(s) Topical <User Schedule>  docusate sodium 100 milliGRAM(s) Oral three times a day  finasteride 5 milliGRAM(s) Oral daily  furosemide   Injectable 40 milliGRAM(s) IV Push daily  metoprolol tartrate 25 milliGRAM(s) Oral two times a day  pantoprazole    Tablet 40 milliGRAM(s) Oral before breakfast  senna 2 Tablet(s) Oral at bedtime  tamsulosin 0.4 milliGRAM(s) Oral at bedtime  tiotropium 18 MICROgram(s) Capsule 1 Capsule(s) Inhalation daily    MEDICATIONS  (PRN):  ALBUTerol/ipratropium for Nebulization 3 milliLiter(s) Nebulizer every 4 hours PRN Shortness of Breath and/or Wheezing  nystatin Powder 1 Application(s) Topical two times a day PRN as needed      Objective:  ICU Vital Signs Last 24 Hrs  T(C): 36.6 (11 Aug 2019 06:00), Max: 36.9 (10 Aug 2019 08:00)  T(F): 97.8 (11 Aug 2019 06:00), Max: 98.4 (10 Aug 2019 08:00)  HR: 82 (11 Aug 2019 06:00) (73 - 83)  BP: 115/64 (11 Aug 2019 06:00) (96/60 - 126/61)  BP(mean): 83 (11 Aug 2019 06:00) (70 - 88)  ABP: --  ABP(mean): --  RR: 17 (11 Aug 2019 06:00) (14 - 25)  SpO2: 93% (11 Aug 2019 06:00) (90% - 96%)      08-10 @ 07:01  -   @ 07:00  --------------------------------------------------------  IN: 770 mL / OUT: 3200 mL / NET: -2430 mL      Daily       PHYSICAL EXAM:  General: WN/WD NAD  Neurology: Awake, nonfocal, MATHIS x 4  Eyes: Scleras clear, PERRLA/ EOMI, Gross vision intact  ENT:Gross hearing intact, grossly patent pharynx, no stridor  Neck: Neck supple, trachea midline, No JVD,   Respiratory: CTA B/L, No wheezing, rales, rhonchi  CV: RRR, S1S2, no murmurs, rubs or gallops  Abdominal: Soft, NT, ND +BS,   Extremities: No edema, + peripheral pulses  Skin: No Rashes, Hematoma, Ecchymosis  Lymphatic: No Neck, axilla, groin LAD  Psych: Oriented x 3, normal affect  Incisions:   Tubes:    TELEMETRY:     EKG:   < from: 12 Lead ECG (08.10.19 @ 07:15) >  Ventricular Rate 83 BPM    Atrial Rate 84 BPM    QRS Duration 100 ms    Q-T Interval 440 ms    QTC Calculation(Bezet) 517 ms    R Axis 72 degrees    T Axis 69 degrees    Diagnosis Line ATRIAL FIBRILLATION  PROLONGED QT  ABNORMAL ECG    < end of copied text >    IMAGING:  < from: TTE with Doppler (w/Cont) (19 @ 17:39) >  Conclusions: EF 40%  1. Mitral annular calcification, otherwise normal mitral  valve. Mild mitral regurgitation.  2. Calcified trileaflet aortic valve with normal opening.  Minimal aortic regurgitation.  3. Mild concentric left ventricular hypertrophy.  4. Endocardial visualization enhanced with intravenous  injection of Ultrasonic Enhancing Agent (Definity).  Moderate segmental left ventricular systolic dysfunction.  No left ventricular thrombus. The mid anterior septum, the  apical inferior wall, the apical anterior wall, the apical  septum, the apical lateral wall, the mid anterior wall, and  the mid inferoseptum are hypokinetic.  5. Mild diastolic dysfunction (Stage I).  6. The right ventricle is not well visualized.  7. Normal pericardium with no pericardial effusion.  *** Compared with echocardiogram of 2019, no  significant changes noted.    < end of copied text >    Labs:                          10.8   10.1  )-----------( 343      ( 11 Aug 2019 03:04 )             36.1         139  |  92<L>  |  14  ----------------------------<  104<H>  3.9   |  34<H>  |  0.88    Ca    8.8      11 Aug 2019 03:04  Phos  2.9       Mg     1.7         TPro  6.8  /  Alb  2.7<L>  /  TBili  0.3  /  DBili  x   /  AST  18  /  ALT  15  /  AlkPhos  122<H>      LIVER FUNCTIONS - ( 11 Aug 2019 03:04 )  Alb: 2.7 g/dL / Pro: 6.8 g/dL / ALK PHOS: 122 U/L / ALT: 15 U/L / AST: 18 U/L / GGT: x           PT/INR - ( 09 Aug 2019 16:28 )   PT: 19.8 sec;   INR: 1.71 ratio         PTT - ( 09 Aug 2019 16:28 )  PTT:34.3 sec    Urinalysis Basic - ( 09 Aug 2019 18:28 )    Color: Yellow / Appearance: Slightly Turbid / S.029 / pH: x  Gluc: x / Ketone: Negative  / Bili: Small / Urobili: 3 mg/dL   Blood: x / Protein: 30 mg/dL / Nitrite: Negative   Leuk Esterase: Negative / RBC: 2 /hpf / WBC 1 /HPF   Sq Epi: x / Non Sq Epi: 1 /hpf / Bacteria: Negative        HEALTH ISSUES - PROBLEM Dx:  Essential hypertension: Essential hypertension  Chronic obstructive pulmonary disease with acute exacerbation: Chronic obstructive pulmonary disease with acute exacerbation  Atrial fibrillation, permanent: Atrial fibrillation, permanent  Acute on chronic systolic (congestive) heart failure: Acute on chronic systolic (congestive) heart failure  COPD (chronic obstructive pulmonary disease): COPD (chronic obstructive pulmonary disease)  Ventricular tachycardia: Ventricular tachycardia Admission date:  CHIEF COMPLAINT:  HPI:  68 y/o M w/ pmhx of HTN, HLD, Gout, MORRO (on CPAP, Home O2 2L), afib, recently admitted for VT, s/p failed VT ablation now s/p ICD and sotalol load.  Now transferred from [facility] for syncope.  In [facility] ED ICD interrogated and found to have multiple episodes of VT.  Admitted for ICD firing. (09 Aug 2019 17:43)    INTERVAL HISTORY: Patient seen and examined, denies CP, dyspnea, orthopnea, PND, palpitations and able to lay flat. NARD noted    REVIEW OF SYSTEMS:    CONSTITUTIONAL: No weakness, fevers or chills  EYES/ENT: No visual changes;  No vertigo or throat pain   NECK: No pain or stiffness  RESPIRATORY: No cough, wheezing, hemoptysis; No shortness of breath  CARDIOVASCULAR: No chest pain or palpitations  GASTROINTESTINAL: No abdominal or epigastric pain. No nausea, vomiting, or hematemesis; No diarrhea or constipation. No melena or hematochezia.  GENITOURINARY: No dysuria, frequency or hematuria  NEUROLOGICAL: No numbness or weakness  SKIN: No itching, rashes        MEDICATIONS  (STANDING):  amiodarone    Tablet   Oral   amiodarone    Tablet 400 milliGRAM(s) Oral every 8 hours  apixaban 5 milliGRAM(s) Oral two times a day  atorvastatin 40 milliGRAM(s) Oral at bedtime  buDESOnide 160 MICROgram(s)/formoterol 4.5 MICROgram(s) Inhaler 2 Puff(s) Inhalation two times a day  chlorhexidine 2% Cloths 1 Application(s) Topical <User Schedule>  docusate sodium 100 milliGRAM(s) Oral three times a day  finasteride 5 milliGRAM(s) Oral daily  furosemide   Injectable 40 milliGRAM(s) IV Push daily  metoprolol tartrate 25 milliGRAM(s) Oral two times a day  pantoprazole    Tablet 40 milliGRAM(s) Oral before breakfast  senna 2 Tablet(s) Oral at bedtime  tamsulosin 0.4 milliGRAM(s) Oral at bedtime  tiotropium 18 MICROgram(s) Capsule 1 Capsule(s) Inhalation daily    MEDICATIONS  (PRN):  ALBUTerol/ipratropium for Nebulization 3 milliLiter(s) Nebulizer every 4 hours PRN Shortness of Breath and/or Wheezing  nystatin Powder 1 Application(s) Topical two times a day PRN as needed      Objective:  ICU Vital Signs Last 24 Hrs  T(C): 36.6 (11 Aug 2019 06:00), Max: 36.9 (10 Aug 2019 08:00)  T(F): 97.8 (11 Aug 2019 06:00), Max: 98.4 (10 Aug 2019 08:00)  HR: 82 (11 Aug 2019 06:00) (73 - 83)  BP: 115/64 (11 Aug 2019 06:00) (96/60 - 126/61)  BP(mean): 83 (11 Aug 2019 06:00) (70 - 88)  ABP: --  ABP(mean): --  RR: 17 (11 Aug 2019 06:00) (14 - 25)  SpO2: 93% (11 Aug 2019 06:00) (90% - 96%)      08-10 @ 07:01  -   @ 07:00  --------------------------------------------------------  IN: 770 mL / OUT: 3200 mL / NET: -2430 mL      Daily       PHYSICAL EXAM:  General: WN/WD NAD  Neurology: Awake, nonfocal, MATHIS x 4  Eyes: Scleras clear, PERRLA/ EOMI, Gross vision intact  ENT:Gross hearing intact, grossly patent pharynx, no stridor  Neck: Neck supple, trachea midline, No JVD,   Respiratory: CTA B/L, No wheezing, rales, rhonchi  CV: IRRR, S1S2, no murmurs, rubs or gallops  Abdominal: Soft, NT, ND +BS,   Extremities: No edema, + peripheral pulses  Skin: No Rashes, Hematoma, Ecchymosis  Lymphatic: No Neck, axilla, groin LAD  Psych: Oriented x 3, normal affect  Incisions:   Tubes:    TELEMETRY: Afib 80s - 100s    EKG:   < from: 12 Lead ECG (08.10.19 @ 07:15) >  Ventricular Rate 83 BPM    Atrial Rate 84 BPM    QRS Duration 100 ms    Q-T Interval 440 ms    QTC Calculation(Bezet) 517 ms    R Axis 72 degrees    T Axis 69 degrees    Diagnosis Line ATRIAL FIBRILLATION  PROLONGED QT  ABNORMAL ECG    < end of copied text >    IMAGING:  < from: TTE with Doppler (w/Cont) (19 @ 17:39) >  Conclusions: EF 40%  1. Mitral annular calcification, otherwise normal mitral  valve. Mild mitral regurgitation.  2. Calcified trileaflet aortic valve with normal opening.  Minimal aortic regurgitation.  3. Mild concentric left ventricular hypertrophy.  4. Endocardial visualization enhanced with intravenous  injection of Ultrasonic Enhancing Agent (Definity).  Moderate segmental left ventricular systolic dysfunction.  No left ventricular thrombus. The mid anterior septum, the  apical inferior wall, the apical anterior wall, the apical  septum, the apical lateral wall, the mid anterior wall, and  the mid inferoseptum are hypokinetic.  5. Mild diastolic dysfunction (Stage I).  6. The right ventricle is not well visualized.  7. Normal pericardium with no pericardial effusion.  *** Compared with echocardiogram of 2019, no  significant changes noted.    < end of copied text >    Labs:                          10.8   10.1  )-----------( 343      ( 11 Aug 2019 03:04 )             36.1     08    139  |  92<L>  |  14  ----------------------------<  104<H>  3.9   |  34<H>  |  0.88    Ca    8.8      11 Aug 2019 03:04  Phos  2.9       Mg     1.7         TPro  6.8  /  Alb  2.7<L>  /  TBili  0.3  /  DBili  x   /  AST  18  /  ALT  15  /  AlkPhos  122<H>      LIVER FUNCTIONS - ( 11 Aug 2019 03:04 )  Alb: 2.7 g/dL / Pro: 6.8 g/dL / ALK PHOS: 122 U/L / ALT: 15 U/L / AST: 18 U/L / GGT: x           PT/INR - ( 09 Aug 2019 16:28 )   PT: 19.8 sec;   INR: 1.71 ratio         PTT - ( 09 Aug 2019 16:28 )  PTT:34.3 sec    Urinalysis Basic - ( 09 Aug 2019 18:28 )    Color: Yellow / Appearance: Slightly Turbid / S.029 / pH: x  Gluc: x / Ketone: Negative  / Bili: Small / Urobili: 3 mg/dL   Blood: x / Protein: 30 mg/dL / Nitrite: Negative   Leuk Esterase: Negative / RBC: 2 /hpf / WBC 1 /HPF   Sq Epi: x / Non Sq Epi: 1 /hpf / Bacteria: Negative        HEALTH ISSUES - PROBLEM Dx:  Essential hypertension: Essential hypertension  Chronic obstructive pulmonary disease with acute exacerbation: Chronic obstructive pulmonary disease with acute exacerbation  Atrial fibrillation, permanent: Atrial fibrillation, permanent  Acute on chronic systolic (congestive) heart failure: Acute on chronic systolic (congestive) heart failure  COPD (chronic obstructive pulmonary disease): COPD (chronic obstructive pulmonary disease)  Ventricular tachycardia: Ventricular tachycardia

## 2019-08-12 LAB
ALBUMIN SERPL ELPH-MCNC: 2.6 G/DL — LOW (ref 3.3–5)
ALBUMIN SERPL ELPH-MCNC: 3 G/DL — LOW (ref 3.3–5)
ALBUMIN SERPL ELPH-MCNC: 3 G/DL — LOW (ref 3.3–5)
ALP SERPL-CCNC: 113 U/L — SIGNIFICANT CHANGE UP (ref 40–120)
ALP SERPL-CCNC: 117 U/L — SIGNIFICANT CHANGE UP (ref 40–120)
ALP SERPL-CCNC: 121 U/L — HIGH (ref 40–120)
ALT FLD-CCNC: 13 U/L — SIGNIFICANT CHANGE UP (ref 10–45)
ALT FLD-CCNC: 15 U/L — SIGNIFICANT CHANGE UP (ref 10–45)
ALT FLD-CCNC: 15 U/L — SIGNIFICANT CHANGE UP (ref 10–45)
ANION GAP SERPL CALC-SCNC: 11 MMOL/L — SIGNIFICANT CHANGE UP (ref 5–17)
ANION GAP SERPL CALC-SCNC: 12 MMOL/L — SIGNIFICANT CHANGE UP (ref 5–17)
ANION GAP SERPL CALC-SCNC: 9 MMOL/L — SIGNIFICANT CHANGE UP (ref 5–17)
AST SERPL-CCNC: 14 U/L — SIGNIFICANT CHANGE UP (ref 10–40)
AST SERPL-CCNC: 15 U/L — SIGNIFICANT CHANGE UP (ref 10–40)
AST SERPL-CCNC: 17 U/L — SIGNIFICANT CHANGE UP (ref 10–40)
BASOPHILS # BLD AUTO: 0 K/UL — SIGNIFICANT CHANGE UP (ref 0–0.2)
BASOPHILS NFR BLD AUTO: 0.1 % — SIGNIFICANT CHANGE UP (ref 0–2)
BILIRUB SERPL-MCNC: 0.3 MG/DL — SIGNIFICANT CHANGE UP (ref 0.2–1.2)
BUN SERPL-MCNC: 13 MG/DL — SIGNIFICANT CHANGE UP (ref 7–23)
BUN SERPL-MCNC: 14 MG/DL — SIGNIFICANT CHANGE UP (ref 7–23)
BUN SERPL-MCNC: 16 MG/DL — SIGNIFICANT CHANGE UP (ref 7–23)
CALCIUM SERPL-MCNC: 9.2 MG/DL — SIGNIFICANT CHANGE UP (ref 8.4–10.5)
CALCIUM SERPL-MCNC: 9.3 MG/DL — SIGNIFICANT CHANGE UP (ref 8.4–10.5)
CALCIUM SERPL-MCNC: 9.4 MG/DL — SIGNIFICANT CHANGE UP (ref 8.4–10.5)
CHLORIDE SERPL-SCNC: 88 MMOL/L — LOW (ref 96–108)
CHLORIDE SERPL-SCNC: 89 MMOL/L — LOW (ref 96–108)
CHLORIDE SERPL-SCNC: 91 MMOL/L — LOW (ref 96–108)
CO2 SERPL-SCNC: 34 MMOL/L — HIGH (ref 22–31)
CO2 SERPL-SCNC: 35 MMOL/L — HIGH (ref 22–31)
CO2 SERPL-SCNC: 38 MMOL/L — HIGH (ref 22–31)
CREAT SERPL-MCNC: 0.83 MG/DL — SIGNIFICANT CHANGE UP (ref 0.5–1.3)
CREAT SERPL-MCNC: 0.88 MG/DL — SIGNIFICANT CHANGE UP (ref 0.5–1.3)
CREAT SERPL-MCNC: 1.04 MG/DL — SIGNIFICANT CHANGE UP (ref 0.5–1.3)
EOSINOPHIL # BLD AUTO: 0.2 K/UL — SIGNIFICANT CHANGE UP (ref 0–0.5)
EOSINOPHIL NFR BLD AUTO: 2.2 % — SIGNIFICANT CHANGE UP (ref 0–6)
GLUCOSE SERPL-MCNC: 108 MG/DL — HIGH (ref 70–99)
GLUCOSE SERPL-MCNC: 117 MG/DL — HIGH (ref 70–99)
GLUCOSE SERPL-MCNC: 117 MG/DL — HIGH (ref 70–99)
HCT VFR BLD CALC: 37.1 % — LOW (ref 39–50)
HGB BLD-MCNC: 11.1 G/DL — LOW (ref 13–17)
LYMPHOCYTES # BLD AUTO: 1.5 K/UL — SIGNIFICANT CHANGE UP (ref 1–3.3)
LYMPHOCYTES # BLD AUTO: 17.3 % — SIGNIFICANT CHANGE UP (ref 13–44)
MAGNESIUM SERPL-MCNC: 1.8 MG/DL — SIGNIFICANT CHANGE UP (ref 1.6–2.6)
MAGNESIUM SERPL-MCNC: 2.1 MG/DL — SIGNIFICANT CHANGE UP (ref 1.6–2.6)
MAGNESIUM SERPL-MCNC: 2.3 MG/DL — SIGNIFICANT CHANGE UP (ref 1.6–2.6)
MCHC RBC-ENTMCNC: 24.8 PG — LOW (ref 27–34)
MCHC RBC-ENTMCNC: 29.8 GM/DL — LOW (ref 32–36)
MCV RBC AUTO: 83.2 FL — SIGNIFICANT CHANGE UP (ref 80–100)
MONOCYTES # BLD AUTO: 0.9 K/UL — SIGNIFICANT CHANGE UP (ref 0–0.9)
MONOCYTES NFR BLD AUTO: 9.9 % — SIGNIFICANT CHANGE UP (ref 2–14)
NEUTROPHILS # BLD AUTO: 6.1 K/UL — SIGNIFICANT CHANGE UP (ref 1.8–7.4)
NEUTROPHILS NFR BLD AUTO: 70.4 % — SIGNIFICANT CHANGE UP (ref 43–77)
PHOSPHATE SERPL-MCNC: 2.6 MG/DL — SIGNIFICANT CHANGE UP (ref 2.5–4.5)
PHOSPHATE SERPL-MCNC: 2.8 MG/DL — SIGNIFICANT CHANGE UP (ref 2.5–4.5)
PHOSPHATE SERPL-MCNC: 3.2 MG/DL — SIGNIFICANT CHANGE UP (ref 2.5–4.5)
PLATELET # BLD AUTO: 374 K/UL — SIGNIFICANT CHANGE UP (ref 150–400)
POTASSIUM SERPL-MCNC: 3.9 MMOL/L — SIGNIFICANT CHANGE UP (ref 3.5–5.3)
POTASSIUM SERPL-MCNC: 4 MMOL/L — SIGNIFICANT CHANGE UP (ref 3.5–5.3)
POTASSIUM SERPL-MCNC: 4.7 MMOL/L — SIGNIFICANT CHANGE UP (ref 3.5–5.3)
POTASSIUM SERPL-SCNC: 3.9 MMOL/L — SIGNIFICANT CHANGE UP (ref 3.5–5.3)
POTASSIUM SERPL-SCNC: 4 MMOL/L — SIGNIFICANT CHANGE UP (ref 3.5–5.3)
POTASSIUM SERPL-SCNC: 4.7 MMOL/L — SIGNIFICANT CHANGE UP (ref 3.5–5.3)
PROT SERPL-MCNC: 6.9 G/DL — SIGNIFICANT CHANGE UP (ref 6–8.3)
PROT SERPL-MCNC: 7.3 G/DL — SIGNIFICANT CHANGE UP (ref 6–8.3)
PROT SERPL-MCNC: 7.8 G/DL — SIGNIFICANT CHANGE UP (ref 6–8.3)
RBC # BLD: 4.46 M/UL — SIGNIFICANT CHANGE UP (ref 4.2–5.8)
RBC # FLD: 14.1 % — SIGNIFICANT CHANGE UP (ref 10.3–14.5)
SODIUM SERPL-SCNC: 135 MMOL/L — SIGNIFICANT CHANGE UP (ref 135–145)
SODIUM SERPL-SCNC: 135 MMOL/L — SIGNIFICANT CHANGE UP (ref 135–145)
SODIUM SERPL-SCNC: 137 MMOL/L — SIGNIFICANT CHANGE UP (ref 135–145)
WBC # BLD: 8.6 K/UL — SIGNIFICANT CHANGE UP (ref 3.8–10.5)
WBC # FLD AUTO: 8.6 K/UL — SIGNIFICANT CHANGE UP (ref 3.8–10.5)

## 2019-08-12 PROCEDURE — 93010 ELECTROCARDIOGRAM REPORT: CPT

## 2019-08-12 PROCEDURE — 99233 SBSQ HOSP IP/OBS HIGH 50: CPT | Mod: GC

## 2019-08-12 RX ORDER — POTASSIUM CHLORIDE 20 MEQ
40 PACKET (EA) ORAL ONCE
Refills: 0 | Status: COMPLETED | OUTPATIENT
Start: 2019-08-12 | End: 2019-08-12

## 2019-08-12 RX ORDER — POTASSIUM CHLORIDE 20 MEQ
20 PACKET (EA) ORAL ONCE
Refills: 0 | Status: COMPLETED | OUTPATIENT
Start: 2019-08-12 | End: 2019-08-12

## 2019-08-12 RX ORDER — MAGNESIUM SULFATE 500 MG/ML
2 VIAL (ML) INJECTION ONCE
Refills: 0 | Status: COMPLETED | OUTPATIENT
Start: 2019-08-12 | End: 2019-08-12

## 2019-08-12 RX ORDER — LISINOPRIL 2.5 MG/1
2.5 TABLET ORAL DAILY
Refills: 0 | Status: DISCONTINUED | OUTPATIENT
Start: 2019-08-13 | End: 2019-08-13

## 2019-08-12 RX ADMIN — AMIODARONE HYDROCHLORIDE 400 MILLIGRAM(S): 400 TABLET ORAL at 17:32

## 2019-08-12 RX ADMIN — Medication 25 MILLIGRAM(S): at 05:53

## 2019-08-12 RX ADMIN — AMIODARONE HYDROCHLORIDE 400 MILLIGRAM(S): 400 TABLET ORAL at 02:28

## 2019-08-12 RX ADMIN — Medication 50 GRAM(S): at 11:49

## 2019-08-12 RX ADMIN — TAMSULOSIN HYDROCHLORIDE 0.4 MILLIGRAM(S): 0.4 CAPSULE ORAL at 21:37

## 2019-08-12 RX ADMIN — Medication 20 MILLIEQUIVALENT(S): at 13:08

## 2019-08-12 RX ADMIN — CHLORHEXIDINE GLUCONATE 1 APPLICATION(S): 213 SOLUTION TOPICAL at 05:53

## 2019-08-12 RX ADMIN — Medication 40 MILLIEQUIVALENT(S): at 06:43

## 2019-08-12 RX ADMIN — Medication 25 MILLIGRAM(S): at 13:09

## 2019-08-12 RX ADMIN — ATORVASTATIN CALCIUM 40 MILLIGRAM(S): 80 TABLET, FILM COATED ORAL at 21:37

## 2019-08-12 RX ADMIN — TIOTROPIUM BROMIDE 1 CAPSULE(S): 18 CAPSULE ORAL; RESPIRATORY (INHALATION) at 11:16

## 2019-08-12 RX ADMIN — BUDESONIDE AND FORMOTEROL FUMARATE DIHYDRATE 2 PUFF(S): 160; 4.5 AEROSOL RESPIRATORY (INHALATION) at 05:39

## 2019-08-12 RX ADMIN — APIXABAN 5 MILLIGRAM(S): 2.5 TABLET, FILM COATED ORAL at 17:32

## 2019-08-12 RX ADMIN — FINASTERIDE 5 MILLIGRAM(S): 5 TABLET, FILM COATED ORAL at 11:33

## 2019-08-12 RX ADMIN — AMIODARONE HYDROCHLORIDE 400 MILLIGRAM(S): 400 TABLET ORAL at 09:18

## 2019-08-12 RX ADMIN — PANTOPRAZOLE SODIUM 40 MILLIGRAM(S): 20 TABLET, DELAYED RELEASE ORAL at 05:53

## 2019-08-12 RX ADMIN — APIXABAN 5 MILLIGRAM(S): 2.5 TABLET, FILM COATED ORAL at 05:53

## 2019-08-12 RX ADMIN — BUDESONIDE AND FORMOTEROL FUMARATE DIHYDRATE 2 PUFF(S): 160; 4.5 AEROSOL RESPIRATORY (INHALATION) at 17:29

## 2019-08-12 RX ADMIN — Medication 40 MILLIGRAM(S): at 05:53

## 2019-08-12 RX ADMIN — Medication 25 MILLIGRAM(S): at 21:37

## 2019-08-12 NOTE — PROGRESS NOTE ADULT - ASSESSMENT
74 year male with nonischemic CM ,CHF, EF 40-45%, COPD, VT persistent AFIB with recurrent VT on sotalol, Qt  467, mild hypokalemia, worsening respiratory status x 3 days iwth CT showing loculated effusion, possible infiltrate, received amiodarone, metoprolol and furosemide, Now with recurrent short bursts of VT, . respiratory status improved.

## 2019-08-12 NOTE — PROGRESS NOTE ADULT - SUBJECTIVE AND OBJECTIVE BOX
Admission date:  CHIEF COMPLAINT:  HPI:  70 y/o M w/ pmhx of HTN, HLD, Gout, MORRO (on CPAP, Home O2 2L), afib, recently admitted for VT, s/p failed VT ablation now s/p ICD and sotalol load.  Now transferred from [facility] for syncope.  In [facility] ED ICD interrogated and found to have multiple episodes of VT.  Admitted for ICD firing. (09 Aug 2019 17:43)    INTERVAL HISTORY: Patient seen and examined, denies CP, dyspnea, orthopnea, PND, palpitations and able to lay flat. NAD noted.      REVIEW OF SYSTEMS:    CONSTITUTIONAL: No weakness, fevers or chills  EYES/ENT: No visual changes;  No vertigo or throat pain   NECK: No pain or stiffness  RESPIRATORY: + cough, wheezing, hemoptysis; No shortness of breath  CARDIOVASCULAR: No chest pain or palpitations  GASTROINTESTINAL: No abdominal or epigastric pain. No nausea, vomiting, or hematemesis; No diarrhea or constipation. No melena or hematochezia.  GENITOURINARY: No dysuria, frequency or hematuria  NEUROLOGICAL: No numbness or weakness  SKIN: No itching, rashes      MEDICATIONS  (STANDING):  amiodarone    Tablet   Oral   amiodarone    Tablet 400 milliGRAM(s) Oral every 8 hours  apixaban 5 milliGRAM(s) Oral two times a day  atorvastatin 40 milliGRAM(s) Oral at bedtime  buDESOnide 160 MICROgram(s)/formoterol 4.5 MICROgram(s) Inhaler 2 Puff(s) Inhalation two times a day  chlorhexidine 2% Cloths 1 Application(s) Topical <User Schedule>  docusate sodium 100 milliGRAM(s) Oral three times a day  finasteride 5 milliGRAM(s) Oral daily  furosemide   Injectable 40 milliGRAM(s) IV Push daily  metoprolol tartrate 25 milliGRAM(s) Oral every 8 hours  pantoprazole    Tablet 40 milliGRAM(s) Oral before breakfast  senna 2 Tablet(s) Oral at bedtime  tamsulosin 0.4 milliGRAM(s) Oral at bedtime  tiotropium 18 MICROgram(s) Capsule 1 Capsule(s) Inhalation daily    MEDICATIONS  (PRN):  ALBUTerol/ipratropium for Nebulization 3 milliLiter(s) Nebulizer every 4 hours PRN Shortness of Breath and/or Wheezing  nystatin Powder 1 Application(s) Topical two times a day PRN as needed      Objective:  ICU Vital Signs Last 24 Hrs  T(C): 37 (12 Aug 2019 07:05), Max: 37 (11 Aug 2019 16:00)  T(F): 98.6 (12 Aug 2019 07:05), Max: 98.6 (11 Aug 2019 16:00)  HR: 70 (12 Aug 2019 07:05) (67 - 96)  BP: 114/66 (12 Aug 2019 07:05) (84/57 - 132/89)  BP(mean): 85 (12 Aug 2019 07:05) (59 - 103)  ABP: --  ABP(mean): --  RR: 20 (12 Aug 2019 07:05) (10 - 29)  SpO2: 91% (12 Aug 2019 07:05) (91% - 94%)      08-11 @ 07:01  -  08-12 @ 07:00  --------------------------------------------------------  IN: 1117 mL / OUT: 1950 mL / NET: -833 mL      Daily     PHYSICAL EXAM:  General: WN/WD NAD  Neurology: Awake, nonfocal, MATHIS x 4  Eyes: Scleras clear, PERRLA/ EOMI, Gross vision intact  ENT:Gross hearing intact, grossly patent pharynx, no stridor  Neck: Neck supple, trachea midline, No JVD,   Respiratory: CTA diminished at bases B/L, No wheezing, rales, rhonchi  CV: RRR, S1S2, no murmurs, rubs or gallops  Abdominal: Soft, NT, ND +BS,   Extremities: No edema, + peripheral pulses  Skin: No Rashes, Hematoma, Ecchymosis  Lymphatic: No Neck, axilla, groin LAD  Psych: Oriented x 3, normal affect      TELEMETRY:     EKG:   < from: 12 Lead ECG (08.10.19 @ 07:15) >  Ventricular Rate 83 BPM    Atrial Rate 84 BPM    QRS Duration 100 ms    Q-T Interval 440 ms    QTC Calculation(Bezet) 517 ms    R Axis 72 degrees    T Axis 69 degrees    Diagnosis Line ATRIAL FIBRILLATION  PROLONGED QT  ABNORMAL ECG    < end of copied text >    IMAGING:  < from: TTE with Doppler (w/Cont) (08.09.19 @ 17:39) >  Conclusions:  1. Mitral annular calcification, otherwise normal mitral  valve. Mild mitral regurgitation.  2. Calcified trileaflet aortic valve with normal opening.  Minimal aortic regurgitation.  3. Mild concentric left ventricular hypertrophy.  4. Endocardial visualization enhanced with intravenous  injection of Ultrasonic Enhancing Agent (Definity).  Moderate segmental left ventricular systolic dysfunction.  No left ventricular thrombus. The mid anterior septum, the  apical inferior wall, the apical anterior wall, the apical  septum, the apical lateral wall, the mid anterior wall, and  the mid inferoseptum are hypokinetic.  5. Mild diastolic dysfunction (Stage I).  6. The right ventricle is not well visualized.  7. Normal pericardium with no pericardial effusion.  *** Compared with echocardiogram of 7/6/2019, no  significant changes noted.    < end of copied text >    Labs:                          11.1   8.6   )-----------( 374      ( 12 Aug 2019 06:03 )             37.1     08-12    137  |  91<L>  |  14  ----------------------------<  108<H>  3.9   |  35<H>  |  0.83    Ca    9.3      12 Aug 2019 06:03  Phos  3.2     08-12  Mg     2.1     08-12    TPro  6.9  /  Alb  2.6<L>  /  TBili  0.3  /  DBili  x   /  AST  14  /  ALT  15  /  AlkPhos  113  08-12    LIVER FUNCTIONS - ( 12 Aug 2019 06:03 )  Alb: 2.6 g/dL / Pro: 6.9 g/dL / ALK PHOS: 113 U/L / ALT: 15 U/L / AST: 14 U/L / GGT: x             HEALTH ISSUES - PROBLEM Dx:  Essential hypertension: Essential hypertension  Chronic obstructive pulmonary disease with acute exacerbation: Chronic obstructive pulmonary disease with acute exacerbation  Atrial fibrillation, permanent: Atrial fibrillation, permanent  Acute on chronic systolic (congestive) heart failure: Acute on chronic systolic (congestive) heart failure  COPD (chronic obstructive pulmonary disease): COPD (chronic obstructive pulmonary disease)  Ventricular tachycardia: Ventricular tachycardia Admission date:  CHIEF COMPLAINT:  HPI:  70 y/o M w/ pmhx of HTN, HLD, Gout, MORRO (on CPAP, Home O2 2L), afib, recently admitted for VT, s/p failed VT ablation now s/p ICD and sotalol load.  Now transferred from [facility] for syncope.  In [facility] ED ICD interrogated and found to have multiple episodes of VT.  Admitted for ICD firing. (09 Aug 2019 17:43)    INTERVAL HISTORY: Patient seen and examined, denies CP, dyspnea, orthopnea, PND, palpitations and able to lay flat. NAD noted.      REVIEW OF SYSTEMS:    CONSTITUTIONAL: No weakness, fevers or chills  EYES/ENT: No visual changes;  No vertigo or throat pain   NECK: No pain or stiffness  RESPIRATORY: + cough, wheezing, hemoptysis; No shortness of breath  CARDIOVASCULAR: No chest pain or palpitations  GASTROINTESTINAL: No abdominal or epigastric pain. No nausea, vomiting, or hematemesis; No diarrhea or constipation. No melena or hematochezia.  GENITOURINARY: No dysuria, frequency or hematuria  NEUROLOGICAL: No numbness or weakness  SKIN: No itching, rashes      MEDICATIONS  (STANDING):  amiodarone    Tablet   Oral   amiodarone    Tablet 400 milliGRAM(s) Oral every 8 hours  apixaban 5 milliGRAM(s) Oral two times a day  atorvastatin 40 milliGRAM(s) Oral at bedtime  buDESOnide 160 MICROgram(s)/formoterol 4.5 MICROgram(s) Inhaler 2 Puff(s) Inhalation two times a day  chlorhexidine 2% Cloths 1 Application(s) Topical <User Schedule>  docusate sodium 100 milliGRAM(s) Oral three times a day  finasteride 5 milliGRAM(s) Oral daily  furosemide   Injectable 40 milliGRAM(s) IV Push daily  metoprolol tartrate 25 milliGRAM(s) Oral every 8 hours  pantoprazole    Tablet 40 milliGRAM(s) Oral before breakfast  senna 2 Tablet(s) Oral at bedtime  tamsulosin 0.4 milliGRAM(s) Oral at bedtime  tiotropium 18 MICROgram(s) Capsule 1 Capsule(s) Inhalation daily    MEDICATIONS  (PRN):  ALBUTerol/ipratropium for Nebulization 3 milliLiter(s) Nebulizer every 4 hours PRN Shortness of Breath and/or Wheezing  nystatin Powder 1 Application(s) Topical two times a day PRN as needed      Objective:  ICU Vital Signs Last 24 Hrs  T(C): 37 (12 Aug 2019 07:05), Max: 37 (11 Aug 2019 16:00)  T(F): 98.6 (12 Aug 2019 07:05), Max: 98.6 (11 Aug 2019 16:00)  HR: 70 (12 Aug 2019 07:05) (67 - 96)  BP: 114/66 (12 Aug 2019 07:05) (84/57 - 132/89)  BP(mean): 85 (12 Aug 2019 07:05) (59 - 103)  ABP: --  ABP(mean): --  RR: 20 (12 Aug 2019 07:05) (10 - 29)  SpO2: 91% (12 Aug 2019 07:05) (91% - 94%)      08-11 @ 07:01  -  08-12 @ 07:00  --------------------------------------------------------  IN: 1117 mL / OUT: 1950 mL / NET: -833 mL      Daily     PHYSICAL EXAM:  General: WN/WD NAD  Neurology: Awake, nonfocal, MATHIS x 4  Eyes: Scleras clear, PERRLA/ EOMI, Gross vision intact  ENT:Gross hearing intact, grossly patent pharynx, no stridor  Neck: Neck supple, trachea midline, No JVD,   Respiratory: CTA diminished at bases B/L, No wheezing, rales, rhonchi  CV: RRR, S1S2, no murmurs, rubs or gallops  Abdominal: Soft, NT, ND +BS,   Extremities: No edema, + peripheral pulses  Skin: No Rashes, Hematoma, Ecchymosis  Lymphatic: No Neck, axilla, groin LAD  Psych: Oriented x 3, normal affect      TELEMETRY: 76 bpm, AFib    EKG:   < from: 12 Lead ECG (08.10.19 @ 07:15) >  Ventricular Rate 83 BPM    Atrial Rate 84 BPM    QRS Duration 100 ms    Q-T Interval 440 ms    QTC Calculation(Bezet) 517 ms    R Axis 72 degrees    T Axis 69 degrees    Diagnosis Line ATRIAL FIBRILLATION  PROLONGED QT  ABNORMAL ECG    < end of copied text >    IMAGING:  < from: TTE with Doppler (w/Cont) (08.09.19 @ 17:39) >  Conclusions:  1. Mitral annular calcification, otherwise normal mitral  valve. Mild mitral regurgitation.  2. Calcified trileaflet aortic valve with normal opening.  Minimal aortic regurgitation.  3. Mild concentric left ventricular hypertrophy.  4. Endocardial visualization enhanced with intravenous  injection of Ultrasonic Enhancing Agent (Definity).  Moderate segmental left ventricular systolic dysfunction.  No left ventricular thrombus. The mid anterior septum, the  apical inferior wall, the apical anterior wall, the apical  septum, the apical lateral wall, the mid anterior wall, and  the mid inferoseptum are hypokinetic.  5. Mild diastolic dysfunction (Stage I).  6. The right ventricle is not well visualized.  7. Normal pericardium with no pericardial effusion.  *** Compared with echocardiogram of 7/6/2019, no  significant changes noted.    < end of copied text >    Labs:                          11.1   8.6   )-----------( 374      ( 12 Aug 2019 06:03 )             37.1     08-12    137  |  91<L>  |  14  ----------------------------<  108<H>  3.9   |  35<H>  |  0.83    Ca    9.3      12 Aug 2019 06:03  Phos  3.2     08-12  Mg     2.1     08-12    TPro  6.9  /  Alb  2.6<L>  /  TBili  0.3  /  DBili  x   /  AST  14  /  ALT  15  /  AlkPhos  113  08-12    LIVER FUNCTIONS - ( 12 Aug 2019 06:03 )  Alb: 2.6 g/dL / Pro: 6.9 g/dL / ALK PHOS: 113 U/L / ALT: 15 U/L / AST: 14 U/L / GGT: x             HEALTH ISSUES - PROBLEM Dx:  Essential hypertension: Essential hypertension  Chronic obstructive pulmonary disease with acute exacerbation: Chronic obstructive pulmonary disease with acute exacerbation  Atrial fibrillation, permanent: Atrial fibrillation, permanent  Acute on chronic systolic (congestive) heart failure: Acute on chronic systolic (congestive) heart failure  COPD (chronic obstructive pulmonary disease): COPD (chronic obstructive pulmonary disease)  Ventricular tachycardia: Ventricular tachycardia

## 2019-08-12 NOTE — PROGRESS NOTE ADULT - SUBJECTIVE AND OBJECTIVE BOX
====================  CCU MIDNIGHT ROUNDS  ====================    ALICE JUNIOR  [id number]    Patient is a 69y old  Male who presents with a chief complaint of episodes of VT shock ICD (12 Aug 2019 07:31)    ====================  SUMMARY:  ====================    ====================  NEW EVENTS:  ====================    MEDICATIONS  (STANDING):  amiodarone    Tablet   Oral   amiodarone    Tablet 400 milliGRAM(s) Oral every 8 hours  apixaban 5 milliGRAM(s) Oral two times a day  atorvastatin 40 milliGRAM(s) Oral at bedtime  buDESOnide 160 MICROgram(s)/formoterol 4.5 MICROgram(s) Inhaler 2 Puff(s) Inhalation two times a day  chlorhexidine 2% Cloths 1 Application(s) Topical <User Schedule>  docusate sodium 100 milliGRAM(s) Oral three times a day  finasteride 5 milliGRAM(s) Oral daily  furosemide   Injectable 40 milliGRAM(s) IV Push daily  metoprolol tartrate 25 milliGRAM(s) Oral every 8 hours  pantoprazole    Tablet 40 milliGRAM(s) Oral before breakfast  senna 2 Tablet(s) Oral at bedtime  tamsulosin 0.4 milliGRAM(s) Oral at bedtime  tiotropium 18 MICROgram(s) Capsule 1 Capsule(s) Inhalation daily    MEDICATIONS  (PRN):  ALBUTerol/ipratropium for Nebulization 3 milliLiter(s) Nebulizer every 4 hours PRN Shortness of Breath and/or Wheezing  nystatin Powder 1 Application(s) Topical two times a day PRN as needed    ====================  VITALS (Last 12 hrs):  ====================  T(C): 36.4 (08-12-19 @ 12:39), Max: 36.4 (08-12-19 @ 12:39)  T(F): 97.5 (08-12-19 @ 12:39), Max: 97.5 (08-12-19 @ 12:39)  HR: 92 (08-12-19 @ 19:00) (77 - 105)  BP: 122/73 (08-12-19 @ 19:00) (104/58 - 147/94)  BP(mean): 92 (08-12-19 @ 19:00) (74 - 116)  RR: 19 (08-12-19 @ 19:00) (15 - 36)  SpO2: 91% (08-12-19 @ 19:00) (90% - 96%)        I&O's Summary    11 Aug 2019 07:01  -  12 Aug 2019 07:00  --------------------------------------------------------  IN: 1117 mL / OUT: 2200 mL / NET: -1083 mL    12 Aug 2019 07:01  -  12 Aug 2019 20:54  --------------------------------------------------------  IN: 764 mL / OUT: 775 mL / NET: -11 mL      ====================  NEW LABS:  ====================  08-12    135  |  88<L>  |  13  ----------------------------<  117<H>  4.0   |  38<H>  |  0.88    Ca    9.4      12 Aug 2019 10:27  Phos  2.6     08-12  Mg     1.8     08-12    TPro  7.8  /  Alb  3.0<L>  /  TBili  0.3  /  DBili  x   /  AST  15  /  ALT  15  /  AlkPhos  121<H>  08-12    ====================  PLAN:  ====================      Lorrie Darling U NP  Beeper #[id number]  Spectra # [phone] ====================  CCU MIDNIGHT ROUNDS  ====================    ALICE JUNIOR  [id number]    Patient is a 69y old  Male who presents with a chief complaint of episodes of VT shock ICD (12 Aug 2019 07:31)    ====================  SUMMARY: 70 y/o M w/ pmhx of HTN, HLD, Gout, MORRO (on CPAP, Home O2 2L), afib, recently admitted for VT, s/p failed VT ablation now s/p ICD and sotalol load.  Now transferred from [facility] for syncope.  In [facility] ED ICD interrogated and found to have multiple episodes of VT.  Admitted for ICD firing.   ====================    ====================  NEW EVENTS: Telemetry: Afib w/ frequent PVCs/couplets   ====================    MEDICATIONS  (STANDING):  amiodarone    Tablet   Oral   amiodarone    Tablet 400 milliGRAM(s) Oral every 8 hours  apixaban 5 milliGRAM(s) Oral two times a day  atorvastatin 40 milliGRAM(s) Oral at bedtime  buDESOnide 160 MICROgram(s)/formoterol 4.5 MICROgram(s) Inhaler 2 Puff(s) Inhalation two times a day  chlorhexidine 2% Cloths 1 Application(s) Topical <User Schedule>  docusate sodium 100 milliGRAM(s) Oral three times a day  finasteride 5 milliGRAM(s) Oral daily  furosemide   Injectable 40 milliGRAM(s) IV Push daily  metoprolol tartrate 25 milliGRAM(s) Oral every 8 hours  pantoprazole    Tablet 40 milliGRAM(s) Oral before breakfast  senna 2 Tablet(s) Oral at bedtime  tamsulosin 0.4 milliGRAM(s) Oral at bedtime  tiotropium 18 MICROgram(s) Capsule 1 Capsule(s) Inhalation daily    MEDICATIONS  (PRN):  ALBUTerol/ipratropium for Nebulization 3 milliLiter(s) Nebulizer every 4 hours PRN Shortness of Breath and/or Wheezing  nystatin Powder 1 Application(s) Topical two times a day PRN as needed    ====================  VITALS (Last 12 hrs):  ====================  T(C): 36.4 (08-12-19 @ 12:39), Max: 36.4 (08-12-19 @ 12:39)  T(F): 97.5 (08-12-19 @ 12:39), Max: 97.5 (08-12-19 @ 12:39)  HR: 92 (08-12-19 @ 19:00) (77 - 105)  BP: 122/73 (08-12-19 @ 19:00) (104/58 - 147/94)  BP(mean): 92 (08-12-19 @ 19:00) (74 - 116)  RR: 19 (08-12-19 @ 19:00) (15 - 36)  SpO2: 91% (08-12-19 @ 19:00) (90% - 96%)        I&O's Summary    11 Aug 2019 07:01  -  12 Aug 2019 07:00  --------------------------------------------------------  IN: 1117 mL / OUT: 2200 mL / NET: -1083 mL    12 Aug 2019 07:01  -  12 Aug 2019 20:54  --------------------------------------------------------  IN: 764 mL / OUT: 775 mL / NET: -11 mL      ====================  NEW LABS:  ====================  08-12    135  |  88<L>  |  13  ----------------------------<  117<H>  4.0   |  38<H>  |  0.88    Ca    9.4      12 Aug 2019 10:27  Phos  2.6     08-12  Mg     1.8     08-12    TPro  7.8  /  Alb  3.0<L>  /  TBili  0.3  /  DBili  x   /  AST  15  /  ALT  15  /  AlkPhos  121<H>  08-12    ====================  PLAN:  ====================  #VT   Continue Amio 10 G load, will obtain baseline non-CT of the chest   - Continue Metoprolol, off sotalol  Maintain electrolytes stable.      Problem/Plan - 2:  ·  Problem: COPD (chronic obstructive pulmonary disease).  Plan: - Advanced COPD  - Pulm consult Recommendations: Monitor off Abx, RVP (-)  -Continue Spiriva and Symbicort  -8/10 CT Chest for lung baseline: partially loculated R Pleural effusion, Right middle Lobe and lower lobe lung capacity which may represent PNA.       Lorrie Darling CCU NP  Beeper #[id number]  Spectra # [phone] ====================  CCU MIDNIGHT ROUNDS  ====================    ALICE JUNIOR  [id number]    Patient is a 69y old  Male who presents with a chief complaint of episodes of VT shock ICD (12 Aug 2019 07:31)    ====================  SUMMARY: 68 y/o M w/ pmhx of HTN, HLD, Gout, MORRO (on CPAP, Home O2 2L), afib, recently admitted for VT, s/p failed VT ablation now s/p ICD and sotalol load.  Now transferred from [facility] for syncope.  In [facility] ED ICD interrogated and found to have multiple episodes of VT.  Admitted for ICD firing.   ====================    ====================  NEW EVENTS: Telemetry: Afib w/ frequent PVCs/couplets   ====================    MEDICATIONS  (STANDING):  amiodarone    Tablet   Oral   amiodarone    Tablet 400 milliGRAM(s) Oral every 8 hours  apixaban 5 milliGRAM(s) Oral two times a day  atorvastatin 40 milliGRAM(s) Oral at bedtime  buDESOnide 160 MICROgram(s)/formoterol 4.5 MICROgram(s) Inhaler 2 Puff(s) Inhalation two times a day  chlorhexidine 2% Cloths 1 Application(s) Topical <User Schedule>  docusate sodium 100 milliGRAM(s) Oral three times a day  finasteride 5 milliGRAM(s) Oral daily  furosemide   Injectable 40 milliGRAM(s) IV Push daily  metoprolol tartrate 25 milliGRAM(s) Oral every 8 hours  pantoprazole    Tablet 40 milliGRAM(s) Oral before breakfast  senna 2 Tablet(s) Oral at bedtime  tamsulosin 0.4 milliGRAM(s) Oral at bedtime  tiotropium 18 MICROgram(s) Capsule 1 Capsule(s) Inhalation daily    MEDICATIONS  (PRN):  ALBUTerol/ipratropium for Nebulization 3 milliLiter(s) Nebulizer every 4 hours PRN Shortness of Breath and/or Wheezing  nystatin Powder 1 Application(s) Topical two times a day PRN as needed    ====================  VITALS (Last 12 hrs):  ====================  T(C): 36.4 (08-12-19 @ 12:39), Max: 36.4 (08-12-19 @ 12:39)  T(F): 97.5 (08-12-19 @ 12:39), Max: 97.5 (08-12-19 @ 12:39)  HR: 92 (08-12-19 @ 19:00) (77 - 105)  BP: 122/73 (08-12-19 @ 19:00) (104/58 - 147/94)  BP(mean): 92 (08-12-19 @ 19:00) (74 - 116)  RR: 19 (08-12-19 @ 19:00) (15 - 36)  SpO2: 91% (08-12-19 @ 19:00) (90% - 96%)        I&O's Summary    11 Aug 2019 07:01  -  12 Aug 2019 07:00  --------------------------------------------------------  IN: 1117 mL / OUT: 2200 mL / NET: -1083 mL    12 Aug 2019 07:01  -  12 Aug 2019 20:54  --------------------------------------------------------  IN: 764 mL / OUT: 775 mL / NET: -11 mL      ====================  NEW LABS:  ====================  08-12    135  |  88<L>  |  13  ----------------------------<  117<H>  4.0   |  38<H>  |  0.88    Ca    9.4      12 Aug 2019 10:27  Phos  2.6     08-12  Mg     1.8     08-12    TPro  7.8  /  Alb  3.0<L>  /  TBili  0.3  /  DBili  x   /  AST  15  /  ALT  15  /  AlkPhos  121<H>  08-12    ====================  PLAN:  ====================  #VT; likely in setting of ADHF  - Cont. Amio oral load of 10gm  - Cont. Metoprolol 25mg BID, off sotalol  - Trend BMP q12h, replace lytes prn      #Acute on Chronic Systolic HF; midly decompensated   - Increase to Lasix 40mg IV BID, strict I/Os, daily wts, keep -500ml/day  - Cont. BB/ACE-i; titrate up if able     #Afib   - Rate controlled on Metoprolol   - Cont. NOAC      Lorrie Darling CCU NP  Beeper #[id number]  Spectra # [phone] ====================  CCU MIDNIGHT ROUNDS  ====================    ALICE JUNIOR  [id number]    Patient is a 69y old  Male who presents with a chief complaint of episodes of VT shock ICD (12 Aug 2019 07:31)    ====================  SUMMARY: 68 y/o M w/ pmhx of HTN, HLD, Gout, MORRO (on CPAP, Home O2 2L), afib, recently admitted for VT, s/p failed VT ablation now s/p ICD and sotalol load.  Now transferred from [facility] for syncope.  In [facility] ED ICD interrogated and found to have multiple episodes of VT.  Admitted for ICD firing.   ====================    ====================  NEW EVENTS: Telemetry: Afib w/ frequent PVCs/couplets   ====================    MEDICATIONS  (STANDING):  amiodarone    Tablet   Oral   amiodarone    Tablet 400 milliGRAM(s) Oral every 8 hours  apixaban 5 milliGRAM(s) Oral two times a day  atorvastatin 40 milliGRAM(s) Oral at bedtime  buDESOnide 160 MICROgram(s)/formoterol 4.5 MICROgram(s) Inhaler 2 Puff(s) Inhalation two times a day  chlorhexidine 2% Cloths 1 Application(s) Topical <User Schedule>  docusate sodium 100 milliGRAM(s) Oral three times a day  finasteride 5 milliGRAM(s) Oral daily  furosemide   Injectable 40 milliGRAM(s) IV Push daily  metoprolol tartrate 25 milliGRAM(s) Oral every 8 hours  pantoprazole    Tablet 40 milliGRAM(s) Oral before breakfast  senna 2 Tablet(s) Oral at bedtime  tamsulosin 0.4 milliGRAM(s) Oral at bedtime  tiotropium 18 MICROgram(s) Capsule 1 Capsule(s) Inhalation daily    MEDICATIONS  (PRN):  ALBUTerol/ipratropium for Nebulization 3 milliLiter(s) Nebulizer every 4 hours PRN Shortness of Breath and/or Wheezing  nystatin Powder 1 Application(s) Topical two times a day PRN as needed    ====================  VITALS (Last 12 hrs):  ====================  T(C): 36.4 (08-12-19 @ 12:39), Max: 36.4 (08-12-19 @ 12:39)  T(F): 97.5 (08-12-19 @ 12:39), Max: 97.5 (08-12-19 @ 12:39)  HR: 92 (08-12-19 @ 19:00) (77 - 105)  BP: 122/73 (08-12-19 @ 19:00) (104/58 - 147/94)  BP(mean): 92 (08-12-19 @ 19:00) (74 - 116)  RR: 19 (08-12-19 @ 19:00) (15 - 36)  SpO2: 91% (08-12-19 @ 19:00) (90% - 96%)        I&O's Summary    11 Aug 2019 07:01  -  12 Aug 2019 07:00  --------------------------------------------------------  IN: 1117 mL / OUT: 2200 mL / NET: -1083 mL    12 Aug 2019 07:01  -  12 Aug 2019 20:54  --------------------------------------------------------  IN: 764 mL / OUT: 775 mL / NET: -11 mL      ====================  NEW LABS:  ====================  08-12    135  |  88<L>  |  13  ----------------------------<  117<H>  4.0   |  38<H>  |  0.88    Ca    9.4      12 Aug 2019 10:27  Phos  2.6     08-12  Mg     1.8     08-12    TPro  7.8  /  Alb  3.0<L>  /  TBili  0.3  /  DBili  x   /  AST  15  /  ALT  15  /  AlkPhos  121<H>  08-12    ====================  PLAN:  ====================  #VT; likely in setting of ADHF  - Cont. Amio oral load of 10gm, off sotalol   - Cont. Metoprolol 25mg BID; titrate if able   - Trend BMP q12h, replace lytes prn      #Acute on Chronic Systolic HF; mildly decompensated   - Increase to Lasix 40mg IV BID, strict I/Os, daily wts, keep -500ml/day  - Cont. BB/ACE-i; titrate up if able     #Afib   - Rate controlled on Metoprolol   - Cont. NOAC      Lorrie PICHARDOU NP  Beeper #[id number]  Spectra # [phone]

## 2019-08-12 NOTE — PROGRESS NOTE ADULT - SUBJECTIVE AND OBJECTIVE BOX
INTERVAL HPI/OVERNIGHT EVENTS: patient appears comfortable, less short of breath.    MEDICATIONS  (STANDING):  amiodarone    Tablet   Oral   amiodarone    Tablet 400 milliGRAM(s) Oral every 8 hours  apixaban 5 milliGRAM(s) Oral two times a day  atorvastatin 40 milliGRAM(s) Oral at bedtime  buDESOnide 160 MICROgram(s)/formoterol 4.5 MICROgram(s) Inhaler 2 Puff(s) Inhalation two times a day  chlorhexidine 2% Cloths 1 Application(s) Topical <User Schedule>  docusate sodium 100 milliGRAM(s) Oral three times a day  finasteride 5 milliGRAM(s) Oral daily  furosemide   Injectable 40 milliGRAM(s) IV Push daily  metoprolol tartrate 25 milliGRAM(s) Oral every 8 hours  pantoprazole    Tablet 40 milliGRAM(s) Oral before breakfast  senna 2 Tablet(s) Oral at bedtime  tamsulosin 0.4 milliGRAM(s) Oral at bedtime  tiotropium 18 MICROgram(s) Capsule 1 Capsule(s) Inhalation daily    MEDICATIONS  (PRN):  ALBUTerol/ipratropium for Nebulization 3 milliLiter(s) Nebulizer every 4 hours PRN Shortness of Breath and/or Wheezing  nystatin Powder 1 Application(s) Topical two times a day PRN as needed      Allergies    No Known Allergies    Intolerances      ROS:  General: Pt denies recent weight loss/fever/chills    Neurological: denies numbness or  sensation loss    Cardiovascular: denies chest pain/palpitations/leg edema    Respiratory and Thorax: denies SOB/cough/wheezing    Gastrointestinal: denies abdominal pain/diarrhea/constipation/bloody stool    Genitourinary: denies urinary frequency/urgency/ dysuria    Musculoskeletal: denies joint pain or swelling, denies restricted motion    Hematologic: denies abnormal bleeding  	    	  	    		        	    	            Vital Signs Last 24 Hrs  T(C): 36.6 (12 Aug 2019 21:00), Max: 37 (12 Aug 2019 07:05)  T(F): 97.9 (12 Aug 2019 21:00), Max: 98.6 (12 Aug 2019 07:05)  HR: 90 (12 Aug 2019 21:00) (67 - 105)  BP: 131/73 (12 Aug 2019 21:00) (98/59 - 147/94)  BP(mean): 93 (12 Aug 2019 21:00) (72 - 116)  RR: 24 (12 Aug 2019 21:00) (10 - 36)  SpO2: 93% (12 Aug 2019 21:00) (90% - 96%)  Daily     Daily Weight in k.6 (12 Aug 2019 08:00)    0811 @ 07:  -   @ 07:00  --------------------------------------------------------  IN: 1117 mL / OUT: 2200 mL / NET: -1083 mL     @ 07:  -   @ 21:40  --------------------------------------------------------  IN: 764 mL / OUT: 775 mL / NET: -11 mL      Physical Exam:      wdwn male  JVD mildly elevated  cor irregularly irregular  lung clear   abd soft  ext trace edema      LABS:                        11.1   8.6   )-----------( 374      ( 12 Aug 2019 06:03 )             37.1     08-12    135  |  88<L>  |  13  ----------------------------<  117<H>  4.0   |  38<H>  |  0.88    Ca    9.4      12 Aug 2019 10:27  Phos  2.6       Mg     1.8         TPro  7.8  /  Alb  3.0<L>  /  TBili  0.3  /  DBili  x   /  AST  15  /  ALT  15  /  AlkPhos  121<H>  12          RADIOLOGY & ADDITIONAL TESTS:    TELE:    EKG:

## 2019-08-12 NOTE — PROGRESS NOTE ADULT - ATTENDING COMMENTS
Patient is seen and examined with fellow, NP and the CCU house-staff. I agree with the history, physical and the assessment and plan.  on amiodaorne load and BB  c/w diuresis  keep negative  monitor electrolytes closely

## 2019-08-13 LAB
ALBUMIN SERPL ELPH-MCNC: 3 G/DL — LOW (ref 3.3–5)
ALBUMIN SERPL ELPH-MCNC: 3.2 G/DL — LOW (ref 3.3–5)
ALP SERPL-CCNC: 103 U/L — SIGNIFICANT CHANGE UP (ref 40–120)
ALP SERPL-CCNC: 118 U/L — SIGNIFICANT CHANGE UP (ref 40–120)
ALT FLD-CCNC: 11 U/L — SIGNIFICANT CHANGE UP (ref 10–45)
ALT FLD-CCNC: 13 U/L — SIGNIFICANT CHANGE UP (ref 10–45)
ANION GAP SERPL CALC-SCNC: 11 MMOL/L — SIGNIFICANT CHANGE UP (ref 5–17)
ANION GAP SERPL CALC-SCNC: 12 MMOL/L — SIGNIFICANT CHANGE UP (ref 5–17)
AST SERPL-CCNC: 14 U/L — SIGNIFICANT CHANGE UP (ref 10–40)
AST SERPL-CCNC: 15 U/L — SIGNIFICANT CHANGE UP (ref 10–40)
BASOPHILS # BLD AUTO: 0 K/UL — SIGNIFICANT CHANGE UP (ref 0–0.2)
BASOPHILS NFR BLD AUTO: 0.1 % — SIGNIFICANT CHANGE UP (ref 0–2)
BILIRUB SERPL-MCNC: 0.3 MG/DL — SIGNIFICANT CHANGE UP (ref 0.2–1.2)
BILIRUB SERPL-MCNC: 0.4 MG/DL — SIGNIFICANT CHANGE UP (ref 0.2–1.2)
BUN SERPL-MCNC: 16 MG/DL — SIGNIFICANT CHANGE UP (ref 7–23)
BUN SERPL-MCNC: 19 MG/DL — SIGNIFICANT CHANGE UP (ref 7–23)
CALCIUM SERPL-MCNC: 9 MG/DL — SIGNIFICANT CHANGE UP (ref 8.4–10.5)
CALCIUM SERPL-MCNC: 9.8 MG/DL — SIGNIFICANT CHANGE UP (ref 8.4–10.5)
CHLORIDE SERPL-SCNC: 87 MMOL/L — LOW (ref 96–108)
CHLORIDE SERPL-SCNC: 88 MMOL/L — LOW (ref 96–108)
CO2 SERPL-SCNC: 36 MMOL/L — HIGH (ref 22–31)
CO2 SERPL-SCNC: 37 MMOL/L — HIGH (ref 22–31)
CREAT SERPL-MCNC: 1.03 MG/DL — SIGNIFICANT CHANGE UP (ref 0.5–1.3)
CREAT SERPL-MCNC: 1.28 MG/DL — SIGNIFICANT CHANGE UP (ref 0.5–1.3)
EOSINOPHIL # BLD AUTO: 0.2 K/UL — SIGNIFICANT CHANGE UP (ref 0–0.5)
EOSINOPHIL NFR BLD AUTO: 2.4 % — SIGNIFICANT CHANGE UP (ref 0–6)
GLUCOSE SERPL-MCNC: 111 MG/DL — HIGH (ref 70–99)
GLUCOSE SERPL-MCNC: 113 MG/DL — HIGH (ref 70–99)
HCT VFR BLD CALC: 39.5 % — SIGNIFICANT CHANGE UP (ref 39–50)
HGB BLD-MCNC: 12.1 G/DL — LOW (ref 13–17)
LYMPHOCYTES # BLD AUTO: 2 K/UL — SIGNIFICANT CHANGE UP (ref 1–3.3)
LYMPHOCYTES # BLD AUTO: 21.3 % — SIGNIFICANT CHANGE UP (ref 13–44)
MAGNESIUM SERPL-MCNC: 2.1 MG/DL — SIGNIFICANT CHANGE UP (ref 1.6–2.6)
MCHC RBC-ENTMCNC: 25.3 PG — LOW (ref 27–34)
MCHC RBC-ENTMCNC: 30.6 GM/DL — LOW (ref 32–36)
MCV RBC AUTO: 82.7 FL — SIGNIFICANT CHANGE UP (ref 80–100)
MONOCYTES # BLD AUTO: 0.8 K/UL — SIGNIFICANT CHANGE UP (ref 0–0.9)
MONOCYTES NFR BLD AUTO: 8.6 % — SIGNIFICANT CHANGE UP (ref 2–14)
NEUTROPHILS # BLD AUTO: 6.3 K/UL — SIGNIFICANT CHANGE UP (ref 1.8–7.4)
NEUTROPHILS NFR BLD AUTO: 67.7 % — SIGNIFICANT CHANGE UP (ref 43–77)
PHOSPHATE SERPL-MCNC: 3.3 MG/DL — SIGNIFICANT CHANGE UP (ref 2.5–4.5)
PLATELET # BLD AUTO: 402 K/UL — HIGH (ref 150–400)
POTASSIUM SERPL-MCNC: 4.1 MMOL/L — SIGNIFICANT CHANGE UP (ref 3.5–5.3)
POTASSIUM SERPL-MCNC: 4.4 MMOL/L — SIGNIFICANT CHANGE UP (ref 3.5–5.3)
POTASSIUM SERPL-SCNC: 4.1 MMOL/L — SIGNIFICANT CHANGE UP (ref 3.5–5.3)
POTASSIUM SERPL-SCNC: 4.4 MMOL/L — SIGNIFICANT CHANGE UP (ref 3.5–5.3)
PROT SERPL-MCNC: 7.1 G/DL — SIGNIFICANT CHANGE UP (ref 6–8.3)
PROT SERPL-MCNC: 7.9 G/DL — SIGNIFICANT CHANGE UP (ref 6–8.3)
RBC # BLD: 4.77 M/UL — SIGNIFICANT CHANGE UP (ref 4.2–5.8)
RBC # FLD: 14.1 % — SIGNIFICANT CHANGE UP (ref 10.3–14.5)
SODIUM SERPL-SCNC: 134 MMOL/L — LOW (ref 135–145)
SODIUM SERPL-SCNC: 137 MMOL/L — SIGNIFICANT CHANGE UP (ref 135–145)
WBC # BLD: 9.3 K/UL — SIGNIFICANT CHANGE UP (ref 3.8–10.5)
WBC # FLD AUTO: 9.3 K/UL — SIGNIFICANT CHANGE UP (ref 3.8–10.5)

## 2019-08-13 PROCEDURE — 93010 ELECTROCARDIOGRAM REPORT: CPT

## 2019-08-13 PROCEDURE — 99233 SBSQ HOSP IP/OBS HIGH 50: CPT | Mod: GC

## 2019-08-13 RX ORDER — FUROSEMIDE 40 MG
40 TABLET ORAL
Refills: 0 | Status: DISCONTINUED | OUTPATIENT
Start: 2019-08-13 | End: 2019-08-13

## 2019-08-13 RX ORDER — LISINOPRIL 2.5 MG/1
5 TABLET ORAL DAILY
Refills: 0 | Status: DISCONTINUED | OUTPATIENT
Start: 2019-08-13 | End: 2019-08-14

## 2019-08-13 RX ORDER — FUROSEMIDE 40 MG
40 TABLET ORAL DAILY
Refills: 0 | Status: DISCONTINUED | OUTPATIENT
Start: 2019-08-14 | End: 2019-08-15

## 2019-08-13 RX ORDER — ACETAZOLAMIDE 250 MG/1
250 TABLET ORAL ONCE
Refills: 0 | Status: DISCONTINUED | OUTPATIENT
Start: 2019-08-13 | End: 2019-08-13

## 2019-08-13 RX ORDER — IPRATROPIUM/ALBUTEROL SULFATE 18-103MCG
3 AEROSOL WITH ADAPTER (GRAM) INHALATION EVERY 6 HOURS
Refills: 0 | Status: DISCONTINUED | OUTPATIENT
Start: 2019-08-13 | End: 2019-08-16

## 2019-08-13 RX ORDER — ACETAZOLAMIDE 250 MG/1
250 TABLET ORAL ONCE
Refills: 0 | Status: COMPLETED | OUTPATIENT
Start: 2019-08-13 | End: 2019-08-13

## 2019-08-13 RX ORDER — FUROSEMIDE 40 MG
40 TABLET ORAL ONCE
Refills: 0 | Status: COMPLETED | OUTPATIENT
Start: 2019-08-13 | End: 2019-08-13

## 2019-08-13 RX ADMIN — ACETAZOLAMIDE 250 MILLIGRAM(S): 250 TABLET ORAL at 16:03

## 2019-08-13 RX ADMIN — PANTOPRAZOLE SODIUM 40 MILLIGRAM(S): 20 TABLET, DELAYED RELEASE ORAL at 05:48

## 2019-08-13 RX ADMIN — Medication 40 MILLIGRAM(S): at 11:31

## 2019-08-13 RX ADMIN — Medication 3 MILLILITER(S): at 18:25

## 2019-08-13 RX ADMIN — Medication 100 MILLIGRAM(S): at 21:15

## 2019-08-13 RX ADMIN — FINASTERIDE 5 MILLIGRAM(S): 5 TABLET, FILM COATED ORAL at 12:21

## 2019-08-13 RX ADMIN — APIXABAN 5 MILLIGRAM(S): 2.5 TABLET, FILM COATED ORAL at 18:05

## 2019-08-13 RX ADMIN — Medication 3 MILLILITER(S): at 11:27

## 2019-08-13 RX ADMIN — CHLORHEXIDINE GLUCONATE 1 APPLICATION(S): 213 SOLUTION TOPICAL at 05:47

## 2019-08-13 RX ADMIN — Medication 25 MILLIGRAM(S): at 22:10

## 2019-08-13 RX ADMIN — Medication 25 MILLIGRAM(S): at 05:47

## 2019-08-13 RX ADMIN — AMIODARONE HYDROCHLORIDE 400 MILLIGRAM(S): 400 TABLET ORAL at 11:22

## 2019-08-13 RX ADMIN — BUDESONIDE AND FORMOTEROL FUMARATE DIHYDRATE 2 PUFF(S): 160; 4.5 AEROSOL RESPIRATORY (INHALATION) at 05:48

## 2019-08-13 RX ADMIN — BUDESONIDE AND FORMOTEROL FUMARATE DIHYDRATE 2 PUFF(S): 160; 4.5 AEROSOL RESPIRATORY (INHALATION) at 18:38

## 2019-08-13 RX ADMIN — ATORVASTATIN CALCIUM 40 MILLIGRAM(S): 80 TABLET, FILM COATED ORAL at 21:16

## 2019-08-13 RX ADMIN — AMIODARONE HYDROCHLORIDE 400 MILLIGRAM(S): 400 TABLET ORAL at 01:00

## 2019-08-13 RX ADMIN — Medication 40 MILLIGRAM(S): at 01:01

## 2019-08-13 RX ADMIN — APIXABAN 5 MILLIGRAM(S): 2.5 TABLET, FILM COATED ORAL at 05:47

## 2019-08-13 RX ADMIN — TAMSULOSIN HYDROCHLORIDE 0.4 MILLIGRAM(S): 0.4 CAPSULE ORAL at 21:15

## 2019-08-13 RX ADMIN — Medication 25 MILLIGRAM(S): at 14:59

## 2019-08-13 RX ADMIN — LISINOPRIL 2.5 MILLIGRAM(S): 2.5 TABLET ORAL at 05:47

## 2019-08-13 RX ADMIN — TIOTROPIUM BROMIDE 1 CAPSULE(S): 18 CAPSULE ORAL; RESPIRATORY (INHALATION) at 11:27

## 2019-08-13 NOTE — DIETITIAN INITIAL EVALUATION ADULT. - PHYSICAL APPEARANCE
obese/other (specify) Skin: intact  Edema: none at this time  ht:6' , wt: 275.5pounds, BMI: 37.4kg/m2, IBW: 178 pounds +/- 10%

## 2019-08-13 NOTE — PROGRESS NOTE ADULT - SUBJECTIVE AND OBJECTIVE BOX
INTERVAL HPI/OVERNIGHT EVENTS: patient appears comfortable, no chest pain. Given lasix and diamox today.  in sustained atrial fibrillation, no further VT    MEDICATIONS  (STANDING):  acetazolamide Injectable 250 milliGRAM(s) IV Push once  ALBUTerol/ipratropium for Nebulization 3 milliLiter(s) Nebulizer every 6 hours  amiodarone    Tablet   Oral   amiodarone    Tablet 200 milliGRAM(s) Oral daily  apixaban 5 milliGRAM(s) Oral two times a day  atorvastatin 40 milliGRAM(s) Oral at bedtime  buDESOnide 160 MICROgram(s)/formoterol 4.5 MICROgram(s) Inhaler 2 Puff(s) Inhalation two times a day  chlorhexidine 2% Cloths 1 Application(s) Topical <User Schedule>  docusate sodium 100 milliGRAM(s) Oral three times a day  finasteride 5 milliGRAM(s) Oral daily  furosemide   Injectable 40 milliGRAM(s) IV Push two times a day  lisinopril 5 milliGRAM(s) Oral daily  metoprolol tartrate 25 milliGRAM(s) Oral every 8 hours  pantoprazole    Tablet 40 milliGRAM(s) Oral before breakfast  senna 2 Tablet(s) Oral at bedtime  tamsulosin 0.4 milliGRAM(s) Oral at bedtime  tiotropium 18 MICROgram(s) Capsule 1 Capsule(s) Inhalation daily    MEDICATIONS  (PRN):  nystatin Powder 1 Application(s) Topical two times a day PRN as needed      Allergies    No Known Allergies    Intolerances      ROS:  General: Pt denies recent weight loss/fever/chills    Neurological: denies numbness or  sensation loss    Cardiovascular: denies chest pain/palpitations/leg edema    Respiratory and Thorax: denies SOB/cough/wheezing    Gastrointestinal: denies abdominal pain/diarrhea/constipation/bloody stool    Genitourinary: denies urinary frequency/urgency/ dysuria    Musculoskeletal: denies joint pain or swelling, denies restricted motion    Hematologic: denies abnormal bleeding  	    	  	    		        	    	            Vital Signs Last 24 Hrs  T(C): 36.5 (13 Aug 2019 12:00), Max: 36.6 (12 Aug 2019 21:00)  T(F): 97.7 (13 Aug 2019 12:00), Max: 97.9 (12 Aug 2019 21:00)  HR: 97 (13 Aug 2019 15:00) (70 - 103)  BP: 135/61 (13 Aug 2019 14:00) (106/57 - 135/61)  BP(mean): 80 (13 Aug 2019 14:00) (73 - 103)  RR: 25 (13 Aug 2019 15:00) (14 - 30)  SpO2: 93% (13 Aug 2019 15:00) (90% - 96%)  Daily     Daily Weight in k.9 (13 Aug 2019 09:06)     @ 07:  -   @ 07:00  --------------------------------------------------------  IN: 1004 mL / OUT: 2175 mL / NET: -1171 mL     @ 07:  -   @ 15:03  --------------------------------------------------------  IN: 400 mL / OUT: 650 mL / NET: -250 mL      Physical Exam:    wdwn male  no JVD ( upright)  cor irregularly irregular  lung clear left, right crackles at base  abd soft  ext no edema        LABS:                        12.1   9.3   )-----------( 402      ( 13 Aug 2019 04:49 )             39.5     08-13    137  |  88<L>  |  16  ----------------------------<  113<H>  4.4   |  37<H>  |  1.03    Ca    9.8      13 Aug 2019 04:49  Phos  3.3     08-13  Mg     2.1     08-    TPro  7.9  /  Alb  3.2<L>  /  TBili  0.4  /  DBili  x   /  AST  15  /  ALT  13  /  AlkPhos  118  08-13          RADIOLOGY & ADDITIONAL TESTS:    TELE:    EKG:

## 2019-08-13 NOTE — DIETITIAN INITIAL EVALUATION ADULT. - ADD RECOMMEND
Discussed common hidden sources of salt and how to cut down on intake. Reinforced importance of avoiding addition of salt to meals, weighing himself daily and choosing healthy, balanced meals.

## 2019-08-13 NOTE — PROGRESS NOTE ADULT - SUBJECTIVE AND OBJECTIVE BOX
OBJECTIVE:  ICU Vital Signs Last 24 Hrs  T(C): 36.4 (13 Aug 2019 05:00), Max: 36.6 (12 Aug 2019 21:00)  T(F): 97.6 (13 Aug 2019 05:00), Max: 97.9 (12 Aug 2019 21:00)  HR: 71 (13 Aug 2019 07:40) (70 - 105)  BP: 127/73 (13 Aug 2019 07:40) (104/58 - 147/94)  BP(mean): 94 (13 Aug 2019 07:40) (74 - 116)  ABP: --  ABP(mean): --  RR: 23 (13 Aug 2019 07:40) (14 - 36)  SpO2: 94% (13 Aug 2019 07:40) (90% - 96%)        08-12 @ 07:01  -  08-13 @ 07:00  --------------------------------------------------------  IN: 1004 mL / OUT: 2175 mL / NET: -1171 mL      CAPILLARY BLOOD GLUCOSE          PHYSICAL EXAM:  General:   HEENT:   Respiratory:   Cardiovascular:   Abdomen:   Extremities:   Skin:   Neurological:    HOSPITAL MEDICATIONS:  apixaban 5 milliGRAM(s) Oral two times a day      amiodarone    Tablet   Oral   amiodarone    Tablet 400 milliGRAM(s) Oral every 8 hours  amiodarone    Tablet 200 milliGRAM(s) Oral daily  furosemide   Injectable 40 milliGRAM(s) IV Push two times a day  lisinopril 2.5 milliGRAM(s) Oral daily  metoprolol tartrate 25 milliGRAM(s) Oral every 8 hours  tamsulosin 0.4 milliGRAM(s) Oral at bedtime    atorvastatin 40 milliGRAM(s) Oral at bedtime  finasteride 5 milliGRAM(s) Oral daily    ALBUTerol/ipratropium for Nebulization 3 milliLiter(s) Nebulizer every 4 hours PRN  buDESOnide 160 MICROgram(s)/formoterol 4.5 MICROgram(s) Inhaler 2 Puff(s) Inhalation two times a day  tiotropium 18 MICROgram(s) Capsule 1 Capsule(s) Inhalation daily      docusate sodium 100 milliGRAM(s) Oral three times a day  pantoprazole    Tablet 40 milliGRAM(s) Oral before breakfast  senna 2 Tablet(s) Oral at bedtime            chlorhexidine 2% Cloths 1 Application(s) Topical <User Schedule>  nystatin Powder 1 Application(s) Topical two times a day PRN        LABS:                        12.1   9.3   )-----------( 402      ( 13 Aug 2019 04:49 )             39.5     Hgb Trend: 12.1<--, 11.1<--, 10.8<--, 10.4<--, 10.4<--  08-13    137  |  88<L>  |  16  ----------------------------<  113<H>  4.4   |  37<H>  |  1.03    Ca    9.8      13 Aug 2019 04:49  Phos  3.3     08-13  Mg     2.1     08-13    TPro  7.9  /  Alb  3.2<L>  /  TBili  0.4  /  DBili  x   /  AST  15  /  ALT  13  /  AlkPhos  118  08-13    Creatinine Trend: 1.03<--, 1.04<--, 0.88<--, 0.83<--, 1.02<--, 0.86<--            MICROBIOLOGY:     RADIOLOGY:  [ ] Reviewed and interpreted by me    PULMONARY FUNCTION TESTS: Pt seen and examined by the bedside. No acute events overnight. Using home NIV and feeling well. No SOB. Denies fevers, chills, chest pain, abdominal pain, nausea/vomiting, b/l LE edema.     OBJECTIVE:  ICU Vital Signs Last 24 Hrs  T(C): 36.4 (13 Aug 2019 05:00), Max: 36.6 (12 Aug 2019 21:00)  T(F): 97.6 (13 Aug 2019 05:00), Max: 97.9 (12 Aug 2019 21:00)  HR: 71 (13 Aug 2019 07:40) (70 - 105)  BP: 127/73 (13 Aug 2019 07:40) (104/58 - 147/94)  BP(mean): 94 (13 Aug 2019 07:40) (74 - 116)  ABP: --  ABP(mean): --  RR: 23 (13 Aug 2019 07:40) (14 - 36)  SpO2: 94% (13 Aug 2019 07:40) (90% - 96%)    08-12 @ 07:01  -  08-13 @ 07:00  --------------------------------------------------------  IN: 1004 mL / OUT: 2175 mL / NET: -1171 mL    PHYSICAL EXAM:  General: NAD, pleasant  HEENT: NCAT, moist mucosal membranes  Respiratory: CTAB  Cardiovascular: S1/S2 normal, RRR, no murmrus/rubs/gallops appreciated  Abdomen: soft, non-tender, non-distended with normal bowel sounds  Extremities: no b/l LE edema, no cyanosis/clubbing  Skin: warm/dry  Neurological: AAOx3, answering questions, no focal deficits    HOSPITAL MEDICATIONS:  apixaban 5 milliGRAM(s) Oral two times a day      amiodarone    Tablet   Oral   amiodarone    Tablet 400 milliGRAM(s) Oral every 8 hours  amiodarone    Tablet 200 milliGRAM(s) Oral daily  furosemide   Injectable 40 milliGRAM(s) IV Push two times a day  lisinopril 2.5 milliGRAM(s) Oral daily  metoprolol tartrate 25 milliGRAM(s) Oral every 8 hours  tamsulosin 0.4 milliGRAM(s) Oral at bedtime    atorvastatin 40 milliGRAM(s) Oral at bedtime  finasteride 5 milliGRAM(s) Oral daily    ALBUTerol/ipratropium for Nebulization 3 milliLiter(s) Nebulizer every 4 hours PRN  buDESOnide 160 MICROgram(s)/formoterol 4.5 MICROgram(s) Inhaler 2 Puff(s) Inhalation two times a day  tiotropium 18 MICROgram(s) Capsule 1 Capsule(s) Inhalation daily    docusate sodium 100 milliGRAM(s) Oral three times a day  pantoprazole    Tablet 40 milliGRAM(s) Oral before breakfast  senna 2 Tablet(s) Oral at bedtime      chlorhexidine 2% Cloths 1 Application(s) Topical <User Schedule>  nystatin Powder 1 Application(s) Topical two times a day PRN        LABS:                        12.1   9.3   )-----------( 402      ( 13 Aug 2019 04:49 )             39.5     Hgb Trend: 12.1<--, 11.1<--, 10.8<--, 10.4<--, 10.4<--  08-13    137  |  88<L>  |  16  ----------------------------<  113<H>  4.4   |  37<H>  |  1.03    Ca    9.8      13 Aug 2019 04:49  Phos  3.3     08-13  Mg     2.1     08-13    TPro  7.9  /  Alb  3.2<L>  /  TBili  0.4  /  DBili  x   /  AST  15  /  ALT  13  /  AlkPhos  118  08-13    Creatinine Trend: 1.03<--, 1.04<--, 0.88<--, 0.83<--, 1.02<--, 0.86<--

## 2019-08-13 NOTE — PROGRESS NOTE ADULT - SUBJECTIVE AND OBJECTIVE BOX
Patient is a 69y old  Male who presents with a chief complaint of episodes of VT shock ICD (12 Aug 2019 21:40)      Overnight Event:    REVIEW OF SYSTEMS:  	    MEDICATIONS  (STANDING):  amiodarone    Tablet   Oral   amiodarone    Tablet 400 milliGRAM(s) Oral every 8 hours  amiodarone    Tablet 200 milliGRAM(s) Oral daily  apixaban 5 milliGRAM(s) Oral two times a day  atorvastatin 40 milliGRAM(s) Oral at bedtime  buDESOnide 160 MICROgram(s)/formoterol 4.5 MICROgram(s) Inhaler 2 Puff(s) Inhalation two times a day  chlorhexidine 2% Cloths 1 Application(s) Topical <User Schedule>  docusate sodium 100 milliGRAM(s) Oral three times a day  finasteride 5 milliGRAM(s) Oral daily  furosemide   Injectable 40 milliGRAM(s) IV Push two times a day  lisinopril 2.5 milliGRAM(s) Oral daily  metoprolol tartrate 25 milliGRAM(s) Oral every 8 hours  pantoprazole    Tablet 40 milliGRAM(s) Oral before breakfast  senna 2 Tablet(s) Oral at bedtime  tamsulosin 0.4 milliGRAM(s) Oral at bedtime  tiotropium 18 MICROgram(s) Capsule 1 Capsule(s) Inhalation daily    MEDICATIONS  (PRN):  ALBUTerol/ipratropium for Nebulization 3 milliLiter(s) Nebulizer every 4 hours PRN Shortness of Breath and/or Wheezing  nystatin Powder 1 Application(s) Topical two times a day PRN as needed        PHYSICAL EXAM:  Vital Signs Last 24 Hrs  T(C): 36.4 (13 Aug 2019 05:00), Max: 36.6 (12 Aug 2019 21:00)  T(F): 97.6 (13 Aug 2019 05:00), Max: 97.9 (12 Aug 2019 21:00)  HR: 84 (13 Aug 2019 06:00) (70 - 105)  BP: 110/67 (13 Aug 2019 06:00) (104/58 - 147/94)  BP(mean): 83 (13 Aug 2019 06:00) (74 - 116)  RR: 20 (13 Aug 2019 06:00) (14 - 36)  SpO2: 94% (13 Aug 2019 06:00) (90% - 96%)  I&O's Summary    12 Aug 2019 07:01  -  13 Aug 2019 07:00  --------------------------------------------------------  IN: 1004 mL / OUT: 2175 mL / NET: -1171 mL        Appearance: Normal	  HEENT:   Normal oral mucosa, PERRL, EOMI	  Lymphatic: No lymphadenopathy  Cardiovascular: Normal S1 S2, No JVD, No murmurs, No edema  Respiratory: Lungs clear to auscultation	  Psychiatry: A & O x 3, Mood & affect appropriate  Gastrointestinal:  Soft, Non-tender, + BS	  Skin: No rashes, No ecchymoses, No cyanosis	  Neurologic: Non-focal  Extremities: Normal range of motion, No clubbing, cyanosis or edema  Vascular: Peripheral pulses palpable 2+ bilaterally    LABS:	 	                        12.1   9.3   )-----------( 402      ( 13 Aug 2019 04:49 )             39.5     Auto Eosinophil # 0.2   / Auto Eosinophil % 2.4   / Auto Neutrophil # 6.3   / Auto Neutrophil % 67.7  / BANDS % x                            11.1   8.6   )-----------( 374      ( 12 Aug 2019 06:03 )             37.1     Auto Eosinophil # 0.2   / Auto Eosinophil % 2.2   / Auto Neutrophil # 6.1   / Auto Neutrophil % 70.4  / BANDS % x        INR: 1.71 ratio (08-09 @ 16:28)    08-13    137  |  88<L>  |  16  ----------------------------<  113<H>  4.4   |  37<H>  |  1.03  08-12    135  |  89<L>  |  16  ----------------------------<  117<H>  4.7   |  34<H>  |  1.04  08-12    135  |  88<L>  |  13  ----------------------------<  117<H>  4.0   |  38<H>  |  0.88    Ca    9.8      13 Aug 2019 04:49  Mg     2.1     08-13  Phos  3.3     08-13  TPro  7.9  /  Alb  3.2<L>  /  TBili  0.4  /  DBili  x   /  AST  15  /  ALT  13  /  AlkPhos  118  08-13  TPro  7.3  /  Alb  3.0<L>  /  TBili  0.3  /  DBili  x   /  AST  17  /  ALT  13  /  AlkPhos  117  08-12  TPro  7.8  /  Alb  3.0<L>  /  TBili  0.3  /  DBili  x   /  AST  15  /  ALT  15  /  AlkPhos  121<H>  08-12        proBNP: Serum Pro-Brain Natriuretic Peptide: 2459 pg/mL (08-09 @ 16:28)    Lipid Profile: 07-06 Chol 117 LDL 48 HDL 55 Trig 68  HgA1c: 5.6 % (07-05 @ 20:16)    TSH:     CARDIAC MARKERS:        TELEMETRY: 	    ECG:  	  RADIOLOGY:  OTHER: 	    PREVIOUS DIAGNOSTIC TESTING:    [ ] Echocardiogram:  [ ]  Catheterization:  [ ] Stress Test:  	  	  OLYA Vail  Contact # Patient is a 69y old  Male who presents with a chief complaint of episodes of VT shock ICD (12 Aug 2019 21:40)    HPI:  68 y/o M w/ pmhx of HTN, HLD, Gout, MORRO (on CPAP, Home O2 2L), afib, recently admitted for VT, s/p failed VT ablation now s/p ICD and sotalol load.  Now transferred from [facility] for syncope.  In [facility] ED ICD interrogated and found to have multiple episodes of VT.  Admitted for ICD firing. (09 Aug 2019 17:43)    Overnight Event: no issues overnight    REVIEW OF SYSTEMS: no SOB or chest pain  	    MEDICATIONS  (STANDING):  amiodarone    Tablet   Oral   amiodarone    Tablet 400 milliGRAM(s) Oral every 8 hours  amiodarone    Tablet 200 milliGRAM(s) Oral daily  apixaban 5 milliGRAM(s) Oral two times a day  atorvastatin 40 milliGRAM(s) Oral at bedtime  buDESOnide 160 MICROgram(s)/formoterol 4.5 MICROgram(s) Inhaler 2 Puff(s) Inhalation two times a day  chlorhexidine 2% Cloths 1 Application(s) Topical <User Schedule>  docusate sodium 100 milliGRAM(s) Oral three times a day  finasteride 5 milliGRAM(s) Oral daily  furosemide   Injectable 40 milliGRAM(s) IV Push two times a day  lisinopril 2.5 milliGRAM(s) Oral daily  metoprolol tartrate 25 milliGRAM(s) Oral every 8 hours  pantoprazole    Tablet 40 milliGRAM(s) Oral before breakfast  senna 2 Tablet(s) Oral at bedtime  tamsulosin 0.4 milliGRAM(s) Oral at bedtime  tiotropium 18 MICROgram(s) Capsule 1 Capsule(s) Inhalation daily    MEDICATIONS  (PRN):  ALBUTerol/ipratropium for Nebulization 3 milliLiter(s) Nebulizer every 4 hours PRN Shortness of Breath and/or Wheezing  nystatin Powder 1 Application(s) Topical two times a day PRN as needed      PHYSICAL EXAM:  Vital Signs Last 24 Hrs  T(C): 36.4 (13 Aug 2019 05:00), Max: 36.6 (12 Aug 2019 21:00)  T(F): 97.6 (13 Aug 2019 05:00), Max: 97.9 (12 Aug 2019 21:00)  HR: 84 (13 Aug 2019 06:00) (70 - 105)  BP: 110/67 (13 Aug 2019 06:00) (104/58 - 147/94)  BP(mean): 83 (13 Aug 2019 06:00) (74 - 116)  RR: 20 (13 Aug 2019 06:00) (14 - 36)  SpO2: 94% (13 Aug 2019 06:00) (90% - 96%)  I&O's Summary    12 Aug 2019 07:01  -  13 Aug 2019 07:00  --------------------------------------------------------  IN: 1004 mL / OUT: 2175 mL / NET: -1171 mL      Appearance: Normal	  HEENT:   Normal oral mucosa, PERRL, EOMI	  Cardiovascular: Normal S1 S2, No JVD, No murmurs, No edema  Respiratory: Diminished at bases  Psychiatry: A & O x 3, Mood & affect appropriate  Gastrointestinal:  Soft, Non-tender, + BS	  Skin: No rashes, No ecchymoses, No cyanosis	  Neurologic: Non-focal  Extremities: Normal range of motion, No clubbing, cyanosis or edema  Vascular: Peripheral pulses palpable 2+ bilaterally    LABS:	 	                        12.1   9.3   )-----------( 402      ( 13 Aug 2019 04:49 )             39.5     Auto Eosinophil # 0.2   / Auto Eosinophil % 2.4   / Auto Neutrophil # 6.3   / Auto Neutrophil % 67.7  / BANDS % x                            11.1   8.6   )-----------( 374      ( 12 Aug 2019 06:03 )             37.1     Auto Eosinophil # 0.2   / Auto Eosinophil % 2.2   / Auto Neutrophil # 6.1   / Auto Neutrophil % 70.4  / BANDS % x        INR: 1.71 ratio (08-09 @ 16:28)    08-13    137  |  88<L>  |  16  ----------------------------<  113<H>  4.4   |  37<H>  |  1.03  08-12    135  |  89<L>  |  16  ----------------------------<  117<H>  4.7   |  34<H>  |  1.04  08-12    135  |  88<L>  |  13  ----------------------------<  117<H>  4.0   |  38<H>  |  0.88    Ca    9.8      13 Aug 2019 04:49  Mg     2.1     08-13  Phos  3.3     08-13  TPro  7.9  /  Alb  3.2<L>  /  TBili  0.4  /  DBili  x   /  AST  15  /  ALT  13  /  AlkPhos  118  08-13  TPro  7.3  /  Alb  3.0<L>  /  TBili  0.3  /  DBili  x   /  AST  17  /  ALT  13  /  AlkPhos  117  08-12  TPro  7.8  /  Alb  3.0<L>  /  TBili  0.3  /  DBili  x   /  AST  15  /  ALT  15  /  AlkPhos  121<H>  08-12    proBNP: Serum Pro-Brain Natriuretic Peptide: 2459 pg/mL (08-09 @ 16:28)  Lipid Profile: 07-06 Chol 117 LDL 48 HDL 55 Trig 68  HgA1c: 5.6 % (07-05 @ 20:16)      TELEMETRY: 	PVC's     ECG:  	A. fib 100  RADIOLOGY:  OTHER: 	    PREVIOUS DIAGNOSTIC TESTING:    [ ] Echocardiogram: < from: TTE with Doppler (w/Cont) (08.09.19 @ 17:39) >  Conclusions:  1. Mitral annular calcification, otherwise normal mitral  valve. Mild mitral regurgitation.  2. Calcified trileaflet aortic valve with normal opening.  Minimal aortic regurgitation.  3. Mild concentric left ventricular hypertrophy.  4. Endocardial visualization enhanced with intravenous  injection of Ultrasonic Enhancing Agent (Definity).  Moderate segmental left ventricular systolic dysfunction.  No left ventricular thrombus. The mid anterior septum, the  apical inferior wall, the apical anterior wall, the apical  septum, the apical lateral wall, the mid anterior wall, and  the mid inferoseptum are hypokinetic.  5. Mild diastolic dysfunction (Stage I).  6. The right ventricle is not well visualized.  7. Normal pericardium with no pericardial effusion.  *** Compared with echocardiogram of 7/6/2019, no  significant changes noted.    < end of copied text >    [ ]  Catheterization: < from: Cardiac Cath Lab - Adult (07.08.19 @ 15:35) >  CORONARY VESSELS: The coronary circulation is left dominant.  LM:   --  LM: Normal.  LAD:   --  LAD: Normal.  CX:   --  Circumflex: Normal.  RCA:   --  RCA: Normal.    < end of copied text >  	  EMANI VailBanner Casa Grande Medical CenterGENE  Contact #	[phone]

## 2019-08-13 NOTE — PROGRESS NOTE ADULT - ATTENDING COMMENTS
Patient is seen and examined with fellow, NP and the CCU house-staff. I agree with the history, physical and the assessment and plan.  no ectopy over night  on amiodaorne load and BB  c/w COPD nebulizer treatment  c/w diuresis - will change to po

## 2019-08-13 NOTE — CHART NOTE - NSCHARTNOTEFT_GEN_A_CORE
====================  CCU MIDNIGHT ROUNDS  ====================    ALICE JUNIOR  [id number]    ====================  SUMMARY: HPI:  68 y/o M w/ pmhx of HTN, HLD, Gout, MORRO (on CPAP, Home O2 2L), afib, recently admitted for VT, s/p failed VT ablation now s/p ICD and sotalol load.  Now transferred from [facility] for syncope.  In [facility] ED ICD interrogated and found to have multiple episodes of VT.  Admitted for ICD firing. (09 Aug 2019 17:43)    ====================        ====================  NEW EVENTS:  No acute events.  ====================        ====================  VITALS (Last 12 hrs):  ====================    T(C): 36.7 (08-13-19 @ 23:00), Max: 36.8 (08-13-19 @ 19:00)  HR: 84 (08-13-19 @ 23:00) (81 - 103)  BP: 97/53 (08-13-19 @ 23:00) (89/51 - 135/61)  BP(mean): 71 (08-13-19 @ 23:00) (62 - 86)  RR: 17 (08-13-19 @ 23:00) (17 - 30)  SpO2: 94% (08-13-19 @ 23:00) (90% - 97%)        I&O's Summary    12 Aug 2019 07:01  -  13 Aug 2019 07:00  --------------------------------------------------------  IN: 1004 mL / OUT: 2175 mL / NET: -1171 mL    13 Aug 2019 07:01  -  13 Aug 2019 23:51  --------------------------------------------------------  IN: 610 mL / OUT: 1200 mL / NET: -590 mL        ====================  PLAN:  #VT  - in setting of ADHF  - off sotalol, s/p amio load  - c/w BB  - monitor lytes  - dispo- RUSSELL    # Acute on Chronic Systolic HF  - c/w PO lasix  - monitor I/Os  - Cont. BB/ACE-i; titrate up if able     #Afib   - Rate controlled on BB  - Cont. NOAC    ====================    HEALTH ISSUES - PROBLEM Dx:  Essential hypertension: Essential hypertension  Chronic obstructive pulmonary disease with acute exacerbation: Chronic obstructive pulmonary disease with acute exacerbation  Atrial fibrillation, permanent: Atrial fibrillation, permanent  Acute on chronic systolic (congestive) heart failure: Acute on chronic systolic (congestive) heart failure  COPD (chronic obstructive pulmonary disease): COPD (chronic obstructive pulmonary disease)  Ventricular tachycardia: Ventricular tachycardia        YU Herman PA #[id number]

## 2019-08-13 NOTE — PROGRESS NOTE ADULT - ASSESSMENT
68 y/o M w/ pmhx of HTN, HLD, Gout, MORRO (on CPAP, Home O2 2L), afib, recently admitted for VT, s/p failed VT ablation now s/p ICD and sotalol load.  Now transferred from [facility] for syncope.  In [facility] ED ICD interrogated and found to have multiple episodes of VT.  Admitted for ICD firing

## 2019-08-13 NOTE — DIETITIAN INITIAL EVALUATION ADULT. - PERTINENT LABORATORY DATA
7/6: Cholesterol 117, TG 68, HDL 55, LDL 48, 7/5: A1C 5.6%, 8/13: glucose 113- slight elevation noted, would monitor, noted A1C WNL
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
## 2019-08-13 NOTE — PROGRESS NOTE ADULT - ASSESSMENT
69M hx HTN, HLD, MORRO (BiPAP at home and continuous home O2 2L), AFib, and recent admission for VT s/p failed VT ablation and subsequent ICD placement/sotalol load presents as transfer from [facility] for syncope. ICD interrogation found multiple episodes of VT and ICD firing. Patient is now being loaded with amiodarone.       ----------------------------------------  Susana Hayward MD PGY-6  Pulmonary/Critical Care Fellow  Pager # [phone] ([facility]), [phone] ([facility]) 69M hx HTN, HLD, MORRO (BiPAP at home and continuous home O2 2L), AFib, and recent admission for VT s/p failed VT ablation and subsequent ICD placement/sotalol load presents as transfer from [facility] for syncope. ICD interrogation found multiple episodes of VT and ICD firing. Patient is now being loaded with amiodarone. In regards to respiratory issues, he is not SOB and at respiratory baseline.   - cont symbicort 160 bid and spiriva  - cont home NIV  - monitor O2 Sat, maintain above 90%  - should follow up with his outpatient pulmonologist upon discharge  - pulmonary to sign off, please reconsult if questions arise    ----------------------------------------  Susana Hayward MD PGY-6  Pulmonary/Critical Care Fellow  Pager # [phone] ([facility]), [phone] ([facility])

## 2019-08-14 DIAGNOSIS — I50.21 ACUTE SYSTOLIC (CONGESTIVE) HEART FAILURE: ICD-10-CM

## 2019-08-14 LAB
ALBUMIN SERPL ELPH-MCNC: 2.5 G/DL — LOW (ref 3.3–5)
ALP SERPL-CCNC: 97 U/L — SIGNIFICANT CHANGE UP (ref 40–120)
ALT FLD-CCNC: 11 U/L — SIGNIFICANT CHANGE UP (ref 10–45)
ANION GAP SERPL CALC-SCNC: 11 MMOL/L — SIGNIFICANT CHANGE UP (ref 5–17)
AST SERPL-CCNC: 12 U/L — SIGNIFICANT CHANGE UP (ref 10–40)
BASOPHILS # BLD AUTO: 0 K/UL — SIGNIFICANT CHANGE UP (ref 0–0.2)
BASOPHILS NFR BLD AUTO: 0 % — SIGNIFICANT CHANGE UP (ref 0–2)
BILIRUB SERPL-MCNC: 0.3 MG/DL — SIGNIFICANT CHANGE UP (ref 0.2–1.2)
BUN SERPL-MCNC: 24 MG/DL — HIGH (ref 7–23)
CALCIUM SERPL-MCNC: 8.8 MG/DL — SIGNIFICANT CHANGE UP (ref 8.4–10.5)
CHLORIDE SERPL-SCNC: 88 MMOL/L — LOW (ref 96–108)
CO2 SERPL-SCNC: 34 MMOL/L — HIGH (ref 22–31)
CREAT SERPL-MCNC: 1.55 MG/DL — HIGH (ref 0.5–1.3)
CULTURE RESULTS: SIGNIFICANT CHANGE UP
CULTURE RESULTS: SIGNIFICANT CHANGE UP
EOSINOPHIL # BLD AUTO: 0.2 K/UL — SIGNIFICANT CHANGE UP (ref 0–0.5)
EOSINOPHIL NFR BLD AUTO: 2.5 % — SIGNIFICANT CHANGE UP (ref 0–6)
GLUCOSE SERPL-MCNC: 108 MG/DL — HIGH (ref 70–99)
HCT VFR BLD CALC: 37.2 % — LOW (ref 39–50)
HGB BLD-MCNC: 11.5 G/DL — LOW (ref 13–17)
LYMPHOCYTES # BLD AUTO: 2.1 K/UL — SIGNIFICANT CHANGE UP (ref 1–3.3)
LYMPHOCYTES # BLD AUTO: 21.4 % — SIGNIFICANT CHANGE UP (ref 13–44)
MAGNESIUM SERPL-MCNC: 2.1 MG/DL — SIGNIFICANT CHANGE UP (ref 1.6–2.6)
MCHC RBC-ENTMCNC: 25.2 PG — LOW (ref 27–34)
MCHC RBC-ENTMCNC: 30.9 GM/DL — LOW (ref 32–36)
MCV RBC AUTO: 81.5 FL — SIGNIFICANT CHANGE UP (ref 80–100)
MONOCYTES # BLD AUTO: 0.7 K/UL — SIGNIFICANT CHANGE UP (ref 0–0.9)
MONOCYTES NFR BLD AUTO: 7.1 % — SIGNIFICANT CHANGE UP (ref 2–14)
NEUTROPHILS # BLD AUTO: 6.6 K/UL — SIGNIFICANT CHANGE UP (ref 1.8–7.4)
NEUTROPHILS NFR BLD AUTO: 68.9 % — SIGNIFICANT CHANGE UP (ref 43–77)
PHOSPHATE SERPL-MCNC: 4.2 MG/DL — SIGNIFICANT CHANGE UP (ref 2.5–4.5)
PLATELET # BLD AUTO: 406 K/UL — HIGH (ref 150–400)
POTASSIUM SERPL-MCNC: 3.7 MMOL/L — SIGNIFICANT CHANGE UP (ref 3.5–5.3)
POTASSIUM SERPL-SCNC: 3.7 MMOL/L — SIGNIFICANT CHANGE UP (ref 3.5–5.3)
PROT SERPL-MCNC: 7 G/DL — SIGNIFICANT CHANGE UP (ref 6–8.3)
RBC # BLD: 4.56 M/UL — SIGNIFICANT CHANGE UP (ref 4.2–5.8)
RBC # FLD: 14.4 % — SIGNIFICANT CHANGE UP (ref 10.3–14.5)
SODIUM SERPL-SCNC: 133 MMOL/L — LOW (ref 135–145)
SPECIMEN SOURCE: SIGNIFICANT CHANGE UP
SPECIMEN SOURCE: SIGNIFICANT CHANGE UP
WBC # BLD: 9.6 K/UL — SIGNIFICANT CHANGE UP (ref 3.8–10.5)
WBC # FLD AUTO: 9.6 K/UL — SIGNIFICANT CHANGE UP (ref 3.8–10.5)

## 2019-08-14 PROCEDURE — 99233 SBSQ HOSP IP/OBS HIGH 50: CPT | Mod: GC

## 2019-08-14 PROCEDURE — 93010 ELECTROCARDIOGRAM REPORT: CPT

## 2019-08-14 RX ORDER — LISINOPRIL 2.5 MG/1
2.5 TABLET ORAL DAILY
Refills: 0 | Status: DISCONTINUED | OUTPATIENT
Start: 2019-08-14 | End: 2019-08-15

## 2019-08-14 RX ORDER — ACETAZOLAMIDE 250 MG/1
250 TABLET ORAL ONCE
Refills: 0 | Status: COMPLETED | OUTPATIENT
Start: 2019-08-14 | End: 2019-08-14

## 2019-08-14 RX ORDER — SODIUM CHLORIDE 9 MG/ML
250 INJECTION INTRAMUSCULAR; INTRAVENOUS; SUBCUTANEOUS ONCE
Refills: 0 | Status: COMPLETED | OUTPATIENT
Start: 2019-08-14 | End: 2019-08-14

## 2019-08-14 RX ORDER — POTASSIUM CHLORIDE 20 MEQ
20 PACKET (EA) ORAL ONCE
Refills: 0 | Status: COMPLETED | OUTPATIENT
Start: 2019-08-14 | End: 2019-08-14

## 2019-08-14 RX ORDER — METOPROLOL TARTRATE 50 MG
50 TABLET ORAL EVERY 8 HOURS
Refills: 0 | Status: DISCONTINUED | OUTPATIENT
Start: 2019-08-14 | End: 2019-08-16

## 2019-08-14 RX ORDER — POTASSIUM CHLORIDE 20 MEQ
20 PACKET (EA) ORAL DAILY
Refills: 0 | Status: DISCONTINUED | OUTPATIENT
Start: 2019-08-15 | End: 2019-08-16

## 2019-08-14 RX ORDER — SODIUM CHLORIDE 9 MG/ML
500 INJECTION INTRAMUSCULAR; INTRAVENOUS; SUBCUTANEOUS ONCE
Refills: 0 | Status: COMPLETED | OUTPATIENT
Start: 2019-08-14 | End: 2019-08-14

## 2019-08-14 RX ORDER — AMIODARONE HYDROCHLORIDE 400 MG/1
200 TABLET ORAL DAILY
Refills: 0 | Status: DISCONTINUED | OUTPATIENT
Start: 2019-08-14 | End: 2019-08-16

## 2019-08-14 RX ADMIN — FINASTERIDE 5 MILLIGRAM(S): 5 TABLET, FILM COATED ORAL at 09:31

## 2019-08-14 RX ADMIN — Medication 50 MILLIGRAM(S): at 11:16

## 2019-08-14 RX ADMIN — APIXABAN 5 MILLIGRAM(S): 2.5 TABLET, FILM COATED ORAL at 04:21

## 2019-08-14 RX ADMIN — LISINOPRIL 2.5 MILLIGRAM(S): 2.5 TABLET ORAL at 09:31

## 2019-08-14 RX ADMIN — BUDESONIDE AND FORMOTEROL FUMARATE DIHYDRATE 2 PUFF(S): 160; 4.5 AEROSOL RESPIRATORY (INHALATION) at 06:23

## 2019-08-14 RX ADMIN — BUDESONIDE AND FORMOTEROL FUMARATE DIHYDRATE 2 PUFF(S): 160; 4.5 AEROSOL RESPIRATORY (INHALATION) at 17:19

## 2019-08-14 RX ADMIN — APIXABAN 5 MILLIGRAM(S): 2.5 TABLET, FILM COATED ORAL at 17:00

## 2019-08-14 RX ADMIN — SODIUM CHLORIDE 250 MILLILITER(S): 9 INJECTION INTRAMUSCULAR; INTRAVENOUS; SUBCUTANEOUS at 15:01

## 2019-08-14 RX ADMIN — LISINOPRIL 5 MILLIGRAM(S): 2.5 TABLET ORAL at 06:08

## 2019-08-14 RX ADMIN — Medication 3 MILLILITER(S): at 12:51

## 2019-08-14 RX ADMIN — ACETAZOLAMIDE 250 MILLIGRAM(S): 250 TABLET ORAL at 02:59

## 2019-08-14 RX ADMIN — Medication 20 MILLIEQUIVALENT(S): at 04:22

## 2019-08-14 RX ADMIN — TAMSULOSIN HYDROCHLORIDE 0.4 MILLIGRAM(S): 0.4 CAPSULE ORAL at 21:16

## 2019-08-14 RX ADMIN — Medication 3 MILLILITER(S): at 06:23

## 2019-08-14 RX ADMIN — AMIODARONE HYDROCHLORIDE 200 MILLIGRAM(S): 400 TABLET ORAL at 04:15

## 2019-08-14 RX ADMIN — PANTOPRAZOLE SODIUM 40 MILLIGRAM(S): 20 TABLET, DELAYED RELEASE ORAL at 04:21

## 2019-08-14 RX ADMIN — SODIUM CHLORIDE 500 MILLILITER(S): 9 INJECTION INTRAMUSCULAR; INTRAVENOUS; SUBCUTANEOUS at 20:00

## 2019-08-14 RX ADMIN — TIOTROPIUM BROMIDE 1 CAPSULE(S): 18 CAPSULE ORAL; RESPIRATORY (INHALATION) at 12:50

## 2019-08-14 RX ADMIN — Medication 40 MILLIGRAM(S): at 04:22

## 2019-08-14 RX ADMIN — Medication 3 MILLILITER(S): at 17:19

## 2019-08-14 RX ADMIN — Medication 25 MILLIGRAM(S): at 04:18

## 2019-08-14 RX ADMIN — Medication 100 MILLIGRAM(S): at 04:21

## 2019-08-14 RX ADMIN — ATORVASTATIN CALCIUM 40 MILLIGRAM(S): 80 TABLET, FILM COATED ORAL at 21:16

## 2019-08-14 RX ADMIN — CHLORHEXIDINE GLUCONATE 1 APPLICATION(S): 213 SOLUTION TOPICAL at 06:03

## 2019-08-14 NOTE — PROGRESS NOTE ADULT - ASSESSMENT
74 year male with nonischemic CM ,CHF, EF 40-45%, COPD, VT persistent AFIB with recurrent VT on sotalol, Qt  467, mild hypokalemia, worsening respiratory status x 3 days iwth CT showing loculated effusion, possible infiltrate, received amiodarone, metoprolol and furosemide, Now with recurrent short bursts of VT, . placed on increased metoprolol , ACE and lasix,  and diamox, now hypotensive increased creatinine with hypochloremic alkolosis, with hypotension, SBP 70s  suggestive of overdiuresis.  respiratory status improved.

## 2019-08-14 NOTE — PROGRESS NOTE ADULT - ASSESSMENT
A/P: 69 year old M PMHx HTN, HLD, a-fib, vt s/p ablation (ICD and sotalol), MORRO on cpap and home o2 transferred from [facility] for syncope and A/P: 69 year old M PMHx HTN, HLD, a-fib, vt s/p ablation (ICD and sotalol), MORRO on cpap and home o2 transferred from [facility] for syncope.    - clinically stable with no chest pain, palpitations  - ekg and telemetry reviewed, rate controlled a-fib  - c/w eliquis 5mg bid, chadsvasc score 2  - sotalol switched to amio, s/p amio load  - c/w maintenance amio 200mg QD  - hypotensive around 7PM with sbp 70s, hold off lisinopril and metoprolol  - physical therapy rec sub-acute rehab, discharge pending    Juventino Chan, #[phone]  Cardiology Fellow, CCU 2

## 2019-08-14 NOTE — PROGRESS NOTE ADULT - SUBJECTIVE AND OBJECTIVE BOX
Cardiology Progress Note    Interval: Pt resting comfortably in bed. Noted no chest pain, palpitations, and sob.    Tele: Sinus rhythm    HPI:  70 y/o M w/ pmhx of HTN, HLD, Gout, MORRO (on CPAP, Home O2 2L), afib, recently admitted for VT, s/p failed VT ablation now s/p ICD and sotalol load.  Now transferred from [facility] for syncope.  In [facility] ED ICD interrogated and found to have multiple episodes of VT.  Admitted for ICD firing. (09 Aug 2019 17:43)      Medications:  ALBUTerol/ipratropium for Nebulization 3 milliLiter(s) Nebulizer every 6 hours  amiodarone    Tablet 200 milliGRAM(s) Oral daily  apixaban 5 milliGRAM(s) Oral two times a day  atorvastatin 40 milliGRAM(s) Oral at bedtime  buDESOnide 160 MICROgram(s)/formoterol 4.5 MICROgram(s) Inhaler 2 Puff(s) Inhalation two times a day  chlorhexidine 2% Cloths 1 Application(s) Topical <User Schedule>  docusate sodium 100 milliGRAM(s) Oral three times a day  finasteride 5 milliGRAM(s) Oral daily  furosemide    Tablet 40 milliGRAM(s) Oral daily  lisinopril 2.5 milliGRAM(s) Oral daily  metoprolol tartrate 50 milliGRAM(s) Oral every 8 hours  nystatin Powder 1 Application(s) Topical two times a day PRN  pantoprazole    Tablet 40 milliGRAM(s) Oral before breakfast  senna 2 Tablet(s) Oral at bedtime  tamsulosin 0.4 milliGRAM(s) Oral at bedtime  tiotropium 18 MICROgram(s) Capsule 1 Capsule(s) Inhalation daily      Review of Systems:  Constitutional: [ ] Fever [ ] Chills [ ] Fatigue [ ] Weight change   HEENT: [ ] Blurred vision [ ] Eye Pain [ ] Headache [ ] Runny nose [ ] Sore Throat   Respiratory: [ ] Cough [ ] Wheezing [ ] Shortness of breath  Cardiovascular: [ ] Chest Pain [ ] Palpitations [ ] CALDERON [ ] PND [ ] Orthopnea  Gastrointestinal: [ ] Abdominal Pain [ ] Diarrhea [ ] Constipation [ ] Hemorrhoids [ ] Nausea [ ] Vomiting  Genitourinary: [ ] Nocturia [ ] Dysuria [ ] Incontinence  Extremities: [ ] Swelling [ ] Joint Pain  Neurologic: [ ] Focal deficit [ ] Paresthesias [ ] Syncope  Lymphatic: [ ] Swelling [ ] Lymphadenopathy   Skin: [ ] Rash [ ] Ecchymoses [ ] Wounds [ ] Lesions  Psychiatry: [ ] Depression [ ] Suicidal/Homicidal Ideation [ ] Anxiety [ ] Sleep Disturbances  [ ] 10 point review of systems is otherwise negative except as mentioned above            [ ]Unable to obtain    Vitals:  T(C): 36.4 (19 @ 19:16), Max: 36.7 (19 @ 23:00)  HR: 94 (19 @ 21:00) (69 - 102)  BP: 89/52 (19 @ 21:00) (70/50 - 121/91)  BP(mean): 66 (19 @ 21:00) (50 - 102)  RR: 25 (19 @ 21:00) (12 - 38)  SpO2: 94% (19 @ 21:00) (90% - 97%)  Wt(kg): --  Daily     Daily Weight in k.6 (14 Aug 2019 05:00)  I&O's Summary    13 Aug 2019 07:  -  14 Aug 2019 07:00  --------------------------------------------------------  IN: 730 mL / OUT: 1600 mL / NET: -870 mL    14 Aug 2019 07:  -  14 Aug 2019 21:42  --------------------------------------------------------  IN: 620 mL / OUT: 200 mL / NET: 420 mL        Physical Exam:  General: NAD  Eye: PERRL, EOMI  HENT: Normal oral mucosa NC/AT  CV: Normal S1/S2, RRR, No M/R/G, no edema, no elevation in JVP  Resp: Normal respiratory effort, clear to auscultation bilaterally, no wheezing, no crackles  Abd: Soft, Non-tender, Non-distended, BS+  Ext: No clubbing, No joint deformity   Neuro: Non-focal, motor and sensory intact  Lymph: No lymphadenopathy  Psych: AAOx3, Mood & affect appropriate  Skin: No rashes, No ecchymoses, No cyanosis    Labs:                        11.5   9.6   )-----------( 406      ( 14 Aug 2019 03:23 )             37.2     08-14    133<L>  |  88<L>  |  24<H>  ----------------------------<  108<H>  3.7   |  34<H>  |  1.55<H>    Ca    8.8      14 Aug 2019 03:23  Phos  4.2     -  Mg     2.1     -    TPro  7.0  /  Alb  2.5<L>  /  TBili  0.3  /  DBili  x   /  AST  12  /  ALT  11  /  AlkPhos  97  -          Serum Pro-Brain Natriuretic Peptide: 2459 pg/mL ( @ 16:28)          New results/imaging: Cardiology Progress Note    Interval: Pt resting comfortably in bed. Noted no chest pain, palpitations, and sob.    Tele: rate-controlled a-fib    HPI:  70 y/o M w/ pmhx of HTN, HLD, Gout, MORRO (on CPAP, Home O2 2L), afib, recently admitted for VT, s/p failed VT ablation now s/p ICD and sotalol load.  Now transferred from [facility] for syncope.  In [facility] ED ICD interrogated and found to have multiple episodes of VT.  Admitted for ICD firing. (09 Aug 2019 17:43)      Medications:  ALBUTerol/ipratropium for Nebulization 3 milliLiter(s) Nebulizer every 6 hours  amiodarone    Tablet 200 milliGRAM(s) Oral daily  apixaban 5 milliGRAM(s) Oral two times a day  atorvastatin 40 milliGRAM(s) Oral at bedtime  buDESOnide 160 MICROgram(s)/formoterol 4.5 MICROgram(s) Inhaler 2 Puff(s) Inhalation two times a day  chlorhexidine 2% Cloths 1 Application(s) Topical <User Schedule>  docusate sodium 100 milliGRAM(s) Oral three times a day  finasteride 5 milliGRAM(s) Oral daily  furosemide    Tablet 40 milliGRAM(s) Oral daily  lisinopril 2.5 milliGRAM(s) Oral daily  metoprolol tartrate 50 milliGRAM(s) Oral every 8 hours  nystatin Powder 1 Application(s) Topical two times a day PRN  pantoprazole    Tablet 40 milliGRAM(s) Oral before breakfast  senna 2 Tablet(s) Oral at bedtime  tamsulosin 0.4 milliGRAM(s) Oral at bedtime  tiotropium 18 MICROgram(s) Capsule 1 Capsule(s) Inhalation daily      Review of Systems:  Constitutional: [ ] Fever [ ] Chills [ ] Fatigue [ ] Weight change   HEENT: [ ] Blurred vision [ ] Eye Pain [ ] Headache [ ] Runny nose [ ] Sore Throat   Respiratory: [ ] Cough [ ] Wheezing [ ] Shortness of breath  Cardiovascular: [ ] Chest Pain [ ] Palpitations [ ] CALDERON [ ] PND [ ] Orthopnea  Gastrointestinal: [ ] Abdominal Pain [ ] Diarrhea [ ] Constipation [ ] Hemorrhoids [ ] Nausea [ ] Vomiting  Genitourinary: [ ] Nocturia [ ] Dysuria [ ] Incontinence  Extremities: [ ] Swelling [ ] Joint Pain  Neurologic: [ ] Focal deficit [ ] Paresthesias [ ] Syncope  Lymphatic: [ ] Swelling [ ] Lymphadenopathy   Skin: [ ] Rash [ ] Ecchymoses [ ] Wounds [ ] Lesions  Psychiatry: [ ] Depression [ ] Suicidal/Homicidal Ideation [ ] Anxiety [ ] Sleep Disturbances  [ ] 10 point review of systems is otherwise negative except as mentioned above            [ ]Unable to obtain    Vitals:  T(C): 36.4 (19 @ 19:16), Max: 36.7 (19 @ 23:00)  HR: 94 (19 @ 21:00) (69 - 102)  BP: 89/52 (19 @ 21:00) (70/50 - 121/91)  BP(mean): 66 (19 @ 21:00) (50 - 102)  RR: 25 (19 @ 21:00) (12 - 38)  SpO2: 94% (19 @ 21:00) (90% - 97%)  Wt(kg): --  Daily     Daily Weight in k.6 (14 Aug 2019 05:00)  I&O's Summary    13 Aug 2019 07:  -  14 Aug 2019 07:00  --------------------------------------------------------  IN: 730 mL / OUT: 1600 mL / NET: -870 mL    14 Aug 2019 07:  -  14 Aug 2019 21:42  --------------------------------------------------------  IN: 620 mL / OUT: 200 mL / NET: 420 mL        Physical Exam:  General: NAD  Eye: PERRL, EOMI  HENT: Normal oral mucosa NC/AT  CV: Normal S1/S2, RRR, No M/R/G, no edema, no elevation in JVP  Resp: Normal respiratory effort, clear to auscultation bilaterally, no wheezing, no crackles  Abd: Soft, Non-tender, Non-distended, BS+  Ext: No clubbing, No joint deformity   Neuro: Non-focal, motor and sensory intact  Lymph: No lymphadenopathy  Psych: AAOx3, Mood & affect appropriate  Skin: No rashes, No ecchymoses, No cyanosis    Labs:                        11.5   9.6   )-----------( 406      ( 14 Aug 2019 03:23 )             37.2     08-14    133<L>  |  88<L>  |  24<H>  ----------------------------<  108<H>  3.7   |  34<H>  |  1.55<H>    Ca    8.8      14 Aug 2019 03:23  Phos  4.2     -  Mg     2.1     -    TPro  7.0  /  Alb  2.5<L>  /  TBili  0.3  /  DBili  x   /  AST  12  /  ALT  11  /  AlkPhos  97  -          Serum Pro-Brain Natriuretic Peptide: 2459 pg/mL ( @ 16:28)          New results/imaging:

## 2019-08-14 NOTE — PROGRESS NOTE ADULT - SUBJECTIVE AND OBJECTIVE BOX
Patient is a 69y old  Male who presents with a chief complaint of episodes of VT shock ICD (13 Aug 2019 15:02)    HPI:  68 y/o M w/ pmhx of HTN, HLD, Gout, MORRO (on CPAP, Home O2 2L), afib, recently admitted for VT, s/p failed VT ablation now s/p ICD and sotalol load.  Now transferred from [facility] for syncope.  In [facility] ED ICD interrogated and found to have multiple episodes of VT.  Admitted for ICD firing. (09 Aug 2019 17:43)    Overnight Event:    REVIEW OF SYSTEMS:  	    MEDICATIONS  (STANDING):  ALBUTerol/ipratropium for Nebulization 3 milliLiter(s) Nebulizer every 6 hours  amiodarone    Tablet 200 milliGRAM(s) Oral daily  apixaban 5 milliGRAM(s) Oral two times a day  atorvastatin 40 milliGRAM(s) Oral at bedtime  buDESOnide 160 MICROgram(s)/formoterol 4.5 MICROgram(s) Inhaler 2 Puff(s) Inhalation two times a day  chlorhexidine 2% Cloths 1 Application(s) Topical <User Schedule>  docusate sodium 100 milliGRAM(s) Oral three times a day  finasteride 5 milliGRAM(s) Oral daily  furosemide    Tablet 40 milliGRAM(s) Oral daily  lisinopril 5 milliGRAM(s) Oral daily  metoprolol tartrate 25 milliGRAM(s) Oral every 8 hours  pantoprazole    Tablet 40 milliGRAM(s) Oral before breakfast  senna 2 Tablet(s) Oral at bedtime  tamsulosin 0.4 milliGRAM(s) Oral at bedtime  tiotropium 18 MICROgram(s) Capsule 1 Capsule(s) Inhalation daily    MEDICATIONS  (PRN):  nystatin Powder 1 Application(s) Topical two times a day PRN as needed        PHYSICAL EXAM:  Vital Signs Last 24 Hrs  T(C): 36.4 (14 Aug 2019 07:00), Max: 36.8 (13 Aug 2019 19:00)  T(F): 97.6 (14 Aug 2019 07:00), Max: 98.2 (13 Aug 2019 19:00)  HR: 74 (14 Aug 2019 07:00) (69 - 103)  BP: 93/54 (14 Aug 2019 07:00) (83/52 - 135/61)  BP(mean): 68 (14 Aug 2019 07:00) (62 - 103)  RR: 17 (14 Aug 2019 07:00) (13 - 30)  SpO2: 94% (14 Aug 2019 07:00) (90% - 97%)  I&O's Summary    13 Aug 2019 07:01  -  14 Aug 2019 07:00  --------------------------------------------------------  IN: 730 mL / OUT: 1600 mL / NET: -870 mL        Appearance: Normal	  HEENT:   Normal oral mucosa, PERRL, EOMI	  Lymphatic: No lymphadenopathy  Cardiovascular: Normal S1 S2, No JVD, No murmurs, No edema  Respiratory: Lungs clear to auscultation	  Psychiatry: A & O x 3, Mood & affect appropriate  Gastrointestinal:  Soft, Non-tender, + BS	  Skin: No rashes, No ecchymoses, No cyanosis	  Neurologic: Non-focal  Extremities: Normal range of motion, No clubbing, cyanosis or edema  Vascular: Peripheral pulses palpable 2+ bilaterally    LABS:	 	                        11.5   9.6   )-----------( 406      ( 14 Aug 2019 03:23 )             37.2     Auto Eosinophil # 0.2   / Auto Eosinophil % 2.5   / Auto Neutrophil # 6.6   / Auto Neutrophil % 68.9  / BANDS % x                            12.1   9.3   )-----------( 402      ( 13 Aug 2019 04:49 )             39.5     Auto Eosinophil # 0.2   / Auto Eosinophil % 2.4   / Auto Neutrophil # 6.3   / Auto Neutrophil % 67.7  / BANDS % x        INR: 1.71 ratio (08-09 @ 16:28)    08-14    133<L>  |  88<L>  |  24<H>  ----------------------------<  108<H>  3.7   |  34<H>  |  1.55<H>  08-13    134<L>  |  87<L>  |  19  ----------------------------<  111<H>  4.1   |  36<H>  |  1.28  08-13    137  |  88<L>  |  16  ----------------------------<  113<H>  4.4   |  37<H>  |  1.03    Ca    8.8      14 Aug 2019 03:23  Mg     2.1     08-14  Phos  4.2     08-14  TPro  7.0  /  Alb  2.5<L>  /  TBili  0.3  /  DBili  x   /  AST  12  /  ALT  11  /  AlkPhos  97  08-14  TPro  7.1  /  Alb  3.0<L>  /  TBili  0.3  /  DBili  x   /  AST  14  /  ALT  11  /  AlkPhos  103  08-13  TPro  7.9  /  Alb  3.2<L>  /  TBili  0.4  /  DBili  x   /  AST  15  /  ALT  13  /  AlkPhos  118  08-13        proBNP: Serum Pro-Brain Natriuretic Peptide: 2459 pg/mL (08-09 @ 16:28)    Lipid Profile: 07-06 Chol 117 LDL 48 HDL 55 Trig 68  HgA1c: 5.6 % (07-05 @ 20:16)      TELEMETRY: 	    ECG:  	  RADIOLOGY:  OTHER: 	    PREVIOUS DIAGNOSTIC TESTING:    [ ] Echocardiogram:  [ ]  Catheterization:    	  EM[facility] VailMary [facility]  Contact #	[phone] Patient is a 69y old  Male who presents with a chief complaint of episodes of VT shock ICD (13 Aug 2019 15:02)    HPI:  70 y/o M w/ pmhx of HTN, HLD, Gout, MORRO (on CPAP, Home O2 2L), afib, recently admitted for VT, s/p failed VT ablation now s/p ICD and sotalol load.  Now transferred from [facility] for syncope.  In [facility] ED ICD interrogated and found to have multiple episodes of VT.  Admitted for ICD firing. (09 Aug 2019 17:43)    Overnight Event: received Diamox 250mg IV    REVIEW OF SYSTEMS: no chest pain or SOB  	    MEDICATIONS  (STANDING):  ALBUTerol/ipratropium for Nebulization 3 milliLiter(s) Nebulizer every 6 hours  amiodarone    Tablet 200 milliGRAM(s) Oral daily  apixaban 5 milliGRAM(s) Oral two times a day  atorvastatin 40 milliGRAM(s) Oral at bedtime  buDESOnide 160 MICROgram(s)/formoterol 4.5 MICROgram(s) Inhaler 2 Puff(s) Inhalation two times a day  chlorhexidine 2% Cloths 1 Application(s) Topical <User Schedule>  docusate sodium 100 milliGRAM(s) Oral three times a day  finasteride 5 milliGRAM(s) Oral daily  furosemide    Tablet 40 milliGRAM(s) Oral daily  lisinopril 5 milliGRAM(s) Oral daily  metoprolol tartrate 25 milliGRAM(s) Oral every 8 hours  pantoprazole    Tablet 40 milliGRAM(s) Oral before breakfast  senna 2 Tablet(s) Oral at bedtime  tamsulosin 0.4 milliGRAM(s) Oral at bedtime  tiotropium 18 MICROgram(s) Capsule 1 Capsule(s) Inhalation daily    MEDICATIONS  (PRN):  nystatin Powder 1 Application(s) Topical two times a day PRN as needed      PHYSICAL EXAM:  Vital Signs Last 24 Hrs  T(C): 36.4 (14 Aug 2019 07:00), Max: 36.8 (13 Aug 2019 19:00)  T(F): 97.6 (14 Aug 2019 07:00), Max: 98.2 (13 Aug 2019 19:00)  HR: 74 (14 Aug 2019 07:00) (69 - 103)  BP: 93/54 (14 Aug 2019 07:00) (83/52 - 135/61)  BP(mean): 68 (14 Aug 2019 07:00) (62 - 103)  RR: 17 (14 Aug 2019 07:00) (13 - 30)  SpO2: 94% (14 Aug 2019 07:00) (90% - 97%)  I&O's Summary    13 Aug 2019 07:01  -  14 Aug 2019 07:00  --------------------------------------------------------  IN: 730 mL / OUT: 1600 mL / NET: -870 mL      Appearance: Normal	  HEENT:   Normal oral mucosa, PERRL, EOMI	  Cardiovascular: Normal S1 S2, No JVD, No murmurs, No edema  Respiratory: Lungs clear to auscultatio, diminished bilateral bases  Psychiatry: A & O x 3, Mood & affect appropriate  Gastrointestinal:  Soft, Non-tender, + BS	  Skin: No rashes, No ecchymoses, No cyanosis	  Neurologic: Non-focal  Extremities: Normal range of motion, No clubbing, cyanosis or edema  Vascular: Peripheral pulses palpable 2+ bilaterally    LABS:	 	                        11.5   9.6   )-----------( 406      ( 14 Aug 2019 03:23 )             37.2     Auto Eosinophil # 0.2   / Auto Eosinophil % 2.5   / Auto Neutrophil # 6.6   / Auto Neutrophil % 68.9  / BANDS % x                            12.1   9.3   )-----------( 402      ( 13 Aug 2019 04:49 )             39.5     Auto Eosinophil # 0.2   / Auto Eosinophil % 2.4   / Auto Neutrophil # 6.3   / Auto Neutrophil % 67.7  / BANDS % x        INR: 1.71 ratio (08-09 @ 16:28)    08-14    133<L>  |  88<L>  |  24<H>  ----------------------------<  108<H>  3.7   |  34<H>  |  1.55<H>  08-13    134<L>  |  87<L>  |  19  ----------------------------<  111<H>  4.1   |  36<H>  |  1.28  08-13    137  |  88<L>  |  16  ----------------------------<  113<H>  4.4   |  37<H>  |  1.03    Ca    8.8      14 Aug 2019 03:23  Mg     2.1     08-14  Phos  4.2     08-14  TPro  7.0  /  Alb  2.5<L>  /  TBili  0.3  /  DBili  x   /  AST  12  /  ALT  11  /  AlkPhos  97  08-14  TPro  7.1  /  Alb  3.0<L>  /  TBili  0.3  /  DBili  x   /  AST  14  /  ALT  11  /  AlkPhos  103  08-13  TPro  7.9  /  Alb  3.2<L>  /  TBili  0.4  /  DBili  x   /  AST  15  /  ALT  13  /  AlkPhos  118  08-13    proBNP: Serum Pro-Brain Natriuretic Peptide: 2459 pg/mL (08-09 @ 16:28)    Lipid Profile: 07-06 Chol 117 LDL 48 HDL 55 Trig 68  HgA1c: 5.6 % (07-05 @ 20:16)      TELEMETRY: 	PVC's    ECG:  	A. fib  RADIOLOGY:  OTHER: 	    PREVIOUS DIAGNOSTIC TESTING:    [ ] Echocardiogram: < from: TTE with Doppler (w/Cont) (08.09.19 @ 17:39) >  Conclusions:  1. Mitral annular calcification, otherwise normal mitral  valve. Mild mitral regurgitation.  2. Calcified trileaflet aortic valve with normal opening.  Minimal aortic regurgitation.  3. Mild concentric left ventricular hypertrophy.  4. Endocardial visualization enhanced with intravenous  injection of Ultrasonic Enhancing Agent (Definity).  Moderate segmental left ventricular systolic dysfunction.  No left ventricular thrombus. The mid anterior septum, the  apical inferior wall, the apical anterior wall, the apical  septum, the apical lateral wall, the mid anterior wall, and  the mid inferoseptum are hypokinetic.  5. Mild diastolic dysfunction (Stage I).  6. The right ventricle is not well visualized.  7. Normal pericardium with no pericardial effusion.  *** Compared with echocardiogram of 7/6/2019, no  significant changes noted.    < end of copied text >    [ ]  Catheterization:    	  ELADIA VailHuntsville Hospital System  Contact #	[phone]

## 2019-08-14 NOTE — PROGRESS NOTE ADULT - SUBJECTIVE AND OBJECTIVE BOX
INTERVAL HPI/OVERNIGHT EVENTS: patient feels well, but appears lethargic, Reciving IV fluids after he was hypotensive today. Creatinine increased . remains hypotensive    MEDICATIONS  (STANDING):  ALBUTerol/ipratropium for Nebulization 3 milliLiter(s) Nebulizer every 6 hours  amiodarone    Tablet 200 milliGRAM(s) Oral daily  apixaban 5 milliGRAM(s) Oral two times a day  atorvastatin 40 milliGRAM(s) Oral at bedtime  buDESOnide 160 MICROgram(s)/formoterol 4.5 MICROgram(s) Inhaler 2 Puff(s) Inhalation two times a day  chlorhexidine 2% Cloths 1 Application(s) Topical <User Schedule>  docusate sodium 100 milliGRAM(s) Oral three times a day  finasteride 5 milliGRAM(s) Oral daily  furosemide    Tablet 40 milliGRAM(s) Oral daily  lisinopril 2.5 milliGRAM(s) Oral daily  metoprolol tartrate 50 milliGRAM(s) Oral every 8 hours  pantoprazole    Tablet 40 milliGRAM(s) Oral before breakfast  senna 2 Tablet(s) Oral at bedtime  tamsulosin 0.4 milliGRAM(s) Oral at bedtime  tiotropium 18 MICROgram(s) Capsule 1 Capsule(s) Inhalation daily    MEDICATIONS  (PRN):  nystatin Powder 1 Application(s) Topical two times a day PRN as needed      Allergies    No Known Allergies    Intolerances      ROS:  General: Pt denies recent weight loss/fever/chills    Neurological: denies numbness or  sensation loss    Cardiovascular: denies chest pain/palpitations/leg edema    Respiratory and Thorax: denies SOB/cough/wheezing    Gastrointestinal: denies abdominal pain/diarrhea/constipation/bloody stool    Genitourinary: denies urinary frequency/urgency/ dysuria    Musculoskeletal: denies joint pain or swelling, denies restricted motion    Hematologic: denies abnormal bleeding  	    	  	    		        	    	            Vital Signs Last 24 Hrs  T(C): 36.4 (14 Aug 2019 19:16), Max: 36.4 (14 Aug 2019 07:00)  T(F): 97.6 (14 Aug 2019 19:16), Max: 97.6 (14 Aug 2019 07:00)  HR: 78 (14 Aug 2019 23:00) (69 - 102)  BP: 73/53 (14 Aug 2019 23:00) (70/50 - 121/91)  BP(mean): 58 (14 Aug 2019 23:00) (50 - 102)  RR: 16 (14 Aug 2019 23:00) (12 - 38)  SpO2: 88% (14 Aug 2019 23:00) (88% - 97%)  Daily     Daily Weight in k.6 (14 Aug 2019 05:00)    08-13 @ 07:01  -   @ 07:00  --------------------------------------------------------  IN: 730 mL / OUT: 1600 mL / NET: -870 mL     @ :  -   @ 23:45  --------------------------------------------------------  IN: 620 mL / OUT: 200 mL / NET: 420 mL      Physical Exam:      LABS:                        11.5   9.6   )-----------( 406      ( 14 Aug 2019 03:23 )             37.2     0814    133<L>  |  88<L>  |  24<H>  ----------------------------<  108<H>  3.7   |  34<H>  |  1.55<H>    Ca    8.8      14 Aug 2019 03:23  Phos  4.2       Mg     2.1         TPro  7.0  /  Alb  2.5<L>  /  TBili  0.3  /  DBili  x   /  AST  12  /  ALT  11  /  AlkPhos  97            RADIOLOGY & ADDITIONAL TESTS:    TELE:    EKG:

## 2019-08-14 NOTE — PROGRESS NOTE ADULT - ATTENDING COMMENTS
Patient is seen and examined with fellow, NP and the CCU house-staff. I agree with the history, physical and the assessment and plan.  recurrent NSVT over night - will increase the BB  s/p amiodaorne load - on 200 mg daily now  will d/w EPS for postential need for another anti arrhythmic if still has recurrent NSVT  Keep K > 4, Mg > 2  euvolemic on oral lasix Patient is seen and examined with fellow, NP and the CCU house-staff. I agree with the history, physical and the assessment and plan.  recurrent NSVT over night - will increase the BB  s/p amiodarone load - on 200 mg daily now  will d/w EPS for potential need for another anti arrhythmic if still has recurrent NSVT  Keep K > 4, Mg > 2  euvolemic on oral lasix

## 2019-08-15 DIAGNOSIS — N17.9 ACUTE KIDNEY FAILURE, UNSPECIFIED: ICD-10-CM

## 2019-08-15 LAB
ALBUMIN SERPL ELPH-MCNC: 2.8 G/DL — LOW (ref 3.3–5)
ALP SERPL-CCNC: 91 U/L — SIGNIFICANT CHANGE UP (ref 40–120)
ALT FLD-CCNC: 10 U/L — SIGNIFICANT CHANGE UP (ref 10–45)
ANION GAP SERPL CALC-SCNC: 11 MMOL/L — SIGNIFICANT CHANGE UP (ref 5–17)
AST SERPL-CCNC: 9 U/L — LOW (ref 10–40)
BASOPHILS # BLD AUTO: 0 K/UL — SIGNIFICANT CHANGE UP (ref 0–0.2)
BASOPHILS NFR BLD AUTO: 0.3 % — SIGNIFICANT CHANGE UP (ref 0–2)
BILIRUB SERPL-MCNC: 0.3 MG/DL — SIGNIFICANT CHANGE UP (ref 0.2–1.2)
BUN SERPL-MCNC: 32 MG/DL — HIGH (ref 7–23)
CALCIUM SERPL-MCNC: 8.6 MG/DL — SIGNIFICANT CHANGE UP (ref 8.4–10.5)
CHLORIDE SERPL-SCNC: 91 MMOL/L — LOW (ref 96–108)
CO2 SERPL-SCNC: 32 MMOL/L — HIGH (ref 22–31)
CREAT SERPL-MCNC: 2.26 MG/DL — HIGH (ref 0.5–1.3)
EOSINOPHIL # BLD AUTO: 0.2 K/UL — SIGNIFICANT CHANGE UP (ref 0–0.5)
EOSINOPHIL NFR BLD AUTO: 2.7 % — SIGNIFICANT CHANGE UP (ref 0–6)
GLUCOSE SERPL-MCNC: 102 MG/DL — HIGH (ref 70–99)
HCT VFR BLD CALC: 36.4 % — LOW (ref 39–50)
HGB BLD-MCNC: 11 G/DL — LOW (ref 13–17)
LYMPHOCYTES # BLD AUTO: 1.9 K/UL — SIGNIFICANT CHANGE UP (ref 1–3.3)
LYMPHOCYTES # BLD AUTO: 22.9 % — SIGNIFICANT CHANGE UP (ref 13–44)
MAGNESIUM SERPL-MCNC: 2 MG/DL — SIGNIFICANT CHANGE UP (ref 1.6–2.6)
MCHC RBC-ENTMCNC: 24.9 PG — LOW (ref 27–34)
MCHC RBC-ENTMCNC: 30.3 GM/DL — LOW (ref 32–36)
MCV RBC AUTO: 82.2 FL — SIGNIFICANT CHANGE UP (ref 80–100)
MONOCYTES # BLD AUTO: 0.6 K/UL — SIGNIFICANT CHANGE UP (ref 0–0.9)
MONOCYTES NFR BLD AUTO: 7.7 % — SIGNIFICANT CHANGE UP (ref 2–14)
NEUTROPHILS # BLD AUTO: 5.4 K/UL — SIGNIFICANT CHANGE UP (ref 1.8–7.4)
NEUTROPHILS NFR BLD AUTO: 66.4 % — SIGNIFICANT CHANGE UP (ref 43–77)
PHOSPHATE SERPL-MCNC: 4.3 MG/DL — SIGNIFICANT CHANGE UP (ref 2.5–4.5)
PLATELET # BLD AUTO: 363 K/UL — SIGNIFICANT CHANGE UP (ref 150–400)
POTASSIUM SERPL-MCNC: 4 MMOL/L — SIGNIFICANT CHANGE UP (ref 3.5–5.3)
POTASSIUM SERPL-SCNC: 4 MMOL/L — SIGNIFICANT CHANGE UP (ref 3.5–5.3)
PROT SERPL-MCNC: 7.2 G/DL — SIGNIFICANT CHANGE UP (ref 6–8.3)
RBC # BLD: 4.42 M/UL — SIGNIFICANT CHANGE UP (ref 4.2–5.8)
RBC # FLD: 14.3 % — SIGNIFICANT CHANGE UP (ref 10.3–14.5)
SODIUM SERPL-SCNC: 134 MMOL/L — LOW (ref 135–145)
WBC # BLD: 8.2 K/UL — SIGNIFICANT CHANGE UP (ref 3.8–10.5)
WBC # FLD AUTO: 8.2 K/UL — SIGNIFICANT CHANGE UP (ref 3.8–10.5)

## 2019-08-15 PROCEDURE — 99233 SBSQ HOSP IP/OBS HIGH 50: CPT | Mod: GC

## 2019-08-15 PROCEDURE — 93010 ELECTROCARDIOGRAM REPORT: CPT

## 2019-08-15 RX ORDER — MEXILETINE HYDROCHLORIDE 150 MG/1
150 CAPSULE ORAL ONCE
Refills: 0 | Status: COMPLETED | OUTPATIENT
Start: 2019-08-15 | End: 2019-08-15

## 2019-08-15 RX ORDER — MEXILETINE HYDROCHLORIDE 150 MG/1
150 CAPSULE ORAL EVERY 8 HOURS
Refills: 0 | Status: DISCONTINUED | OUTPATIENT
Start: 2019-08-15 | End: 2019-08-16

## 2019-08-15 RX ORDER — SODIUM CHLORIDE 9 MG/ML
500 INJECTION INTRAMUSCULAR; INTRAVENOUS; SUBCUTANEOUS ONCE
Refills: 0 | Status: COMPLETED | OUTPATIENT
Start: 2019-08-15 | End: 2019-08-15

## 2019-08-15 RX ADMIN — BUDESONIDE AND FORMOTEROL FUMARATE DIHYDRATE 2 PUFF(S): 160; 4.5 AEROSOL RESPIRATORY (INHALATION) at 05:39

## 2019-08-15 RX ADMIN — Medication 3 MILLILITER(S): at 11:26

## 2019-08-15 RX ADMIN — MEXILETINE HYDROCHLORIDE 150 MILLIGRAM(S): 150 CAPSULE ORAL at 09:02

## 2019-08-15 RX ADMIN — Medication 50 MILLIGRAM(S): at 21:17

## 2019-08-15 RX ADMIN — APIXABAN 5 MILLIGRAM(S): 2.5 TABLET, FILM COATED ORAL at 05:48

## 2019-08-15 RX ADMIN — SODIUM CHLORIDE 250 MILLILITER(S): 9 INJECTION INTRAMUSCULAR; INTRAVENOUS; SUBCUTANEOUS at 00:03

## 2019-08-15 RX ADMIN — MEXILETINE HYDROCHLORIDE 150 MILLIGRAM(S): 150 CAPSULE ORAL at 21:17

## 2019-08-15 RX ADMIN — PANTOPRAZOLE SODIUM 40 MILLIGRAM(S): 20 TABLET, DELAYED RELEASE ORAL at 05:48

## 2019-08-15 RX ADMIN — Medication 20 MILLIEQUIVALENT(S): at 09:03

## 2019-08-15 RX ADMIN — NYSTATIN CREAM 1 APPLICATION(S): 100000 CREAM TOPICAL at 05:48

## 2019-08-15 RX ADMIN — CHLORHEXIDINE GLUCONATE 1 APPLICATION(S): 213 SOLUTION TOPICAL at 05:48

## 2019-08-15 RX ADMIN — Medication 3 MILLILITER(S): at 17:14

## 2019-08-15 RX ADMIN — AMIODARONE HYDROCHLORIDE 200 MILLIGRAM(S): 400 TABLET ORAL at 05:48

## 2019-08-15 RX ADMIN — SODIUM CHLORIDE 250 MILLILITER(S): 9 INJECTION INTRAMUSCULAR; INTRAVENOUS; SUBCUTANEOUS at 05:15

## 2019-08-15 RX ADMIN — Medication 50 MILLIGRAM(S): at 06:44

## 2019-08-15 RX ADMIN — Medication 3 MILLILITER(S): at 05:39

## 2019-08-15 RX ADMIN — BUDESONIDE AND FORMOTEROL FUMARATE DIHYDRATE 2 PUFF(S): 160; 4.5 AEROSOL RESPIRATORY (INHALATION) at 17:13

## 2019-08-15 RX ADMIN — ATORVASTATIN CALCIUM 40 MILLIGRAM(S): 80 TABLET, FILM COATED ORAL at 21:17

## 2019-08-15 RX ADMIN — Medication 3 MILLILITER(S): at 00:50

## 2019-08-15 RX ADMIN — TAMSULOSIN HYDROCHLORIDE 0.4 MILLIGRAM(S): 0.4 CAPSULE ORAL at 21:17

## 2019-08-15 RX ADMIN — APIXABAN 5 MILLIGRAM(S): 2.5 TABLET, FILM COATED ORAL at 17:13

## 2019-08-15 RX ADMIN — TIOTROPIUM BROMIDE 1 CAPSULE(S): 18 CAPSULE ORAL; RESPIRATORY (INHALATION) at 11:26

## 2019-08-15 RX ADMIN — SENNA PLUS 2 TABLET(S): 8.6 TABLET ORAL at 21:17

## 2019-08-15 RX ADMIN — MEXILETINE HYDROCHLORIDE 150 MILLIGRAM(S): 150 CAPSULE ORAL at 13:41

## 2019-08-15 RX ADMIN — Medication 50 MILLIGRAM(S): at 13:42

## 2019-08-15 RX ADMIN — FINASTERIDE 5 MILLIGRAM(S): 5 TABLET, FILM COATED ORAL at 09:02

## 2019-08-15 NOTE — PROGRESS NOTE ADULT - ASSESSMENT
69 year old M PMHx HTN, HLD, a-fib, vt s/p ablation (ICD and sotalol), MORRO on cpap and home o2 transferred from [facility] for syncope in the setting of VT.  Overdiuresed w/ worsening BIANKA; and now s/p IVF.  Euvolemic on exam w/ no further episodes of VT/syncope.       RECS  - cont PO amio, BB, mexiletine and rest of meds  - pending discharge to rehab if no further VT episodes (otherwise VT ablation)     NOBLE Yao MD

## 2019-08-15 NOTE — PROGRESS NOTE ADULT - SUBJECTIVE AND OBJECTIVE BOX
- doing well w/ no new/acute complaints  - no further episodes of VT     Medications:  ALBUTerol/ipratropium for Nebulization 3 milliLiter(s) Nebulizer every 6 hours  amiodarone    Tablet 200 milliGRAM(s) Oral daily  apixaban 5 milliGRAM(s) Oral two times a day  atorvastatin 40 milliGRAM(s) Oral at bedtime  buDESOnide 160 MICROgram(s)/formoterol 4.5 MICROgram(s) Inhaler 2 Puff(s) Inhalation two times a day  chlorhexidine 2% Cloths 1 Application(s) Topical <User Schedule>  docusate sodium 100 milliGRAM(s) Oral three times a day  finasteride 5 milliGRAM(s) Oral daily  metoprolol tartrate 50 milliGRAM(s) Oral every 8 hours  mexiletine 150 milliGRAM(s) Oral every 8 hours  nystatin Powder 1 Application(s) Topical two times a day PRN  pantoprazole    Tablet 40 milliGRAM(s) Oral before breakfast  potassium chloride    Tablet ER 20 milliEquivalent(s) Oral daily  senna 2 Tablet(s) Oral at bedtime  tamsulosin 0.4 milliGRAM(s) Oral at bedtime  tiotropium 18 MICROgram(s) Capsule 1 Capsule(s) Inhalation daily      PAST MEDICAL & SURGICAL HISTORY:  COPD (chronic obstructive pulmonary disease)  HTN (hypertension)  Simple chronic bronchitis  Essential hypertension  No significant past surgical history  No significant past surgical history        Vitals:  T(F): 97.4 (08-15), Max: 97.5 (08-15)  HR: 82 (08-15) (60 - 105)  BP: 93/58 (08-15) (73/53 - 115/73)  RR: 19 (08-15)  SpO2: 93% (08-15)  I&O's Summary    14 Aug 2019 07:01  -  15 Aug 2019 07:00  --------------------------------------------------------  IN: 1740 mL / OUT: 600 mL / NET: 1140 mL    15 Aug 2019 07:01  -  15 Aug 2019 22:34  --------------------------------------------------------  IN: 0 mL / OUT: 900 mL / NET: -900 mL        Physical Exam:  Appearance: No acute distress; well appearing  Eyes: PERRL, EOMI, pink conjunctiva  HENT: Normal oral mucosa  Cardiovascular: RRR, S1, S2, no murmurs, rubs, or gallops; no edema; no JVD  Respiratory: Clear to auscultation bilaterally  Gastrointestinal: soft, non-tender, non-distended with normal bowel sounds  Musculoskeletal: No clubbing; no joint deformity   Neurologic: Non-focal  Lymphatic: No lymphadenopathy  Psychiatry: AAOx3, mood & affect appropriate  Skin: No rashes, ecchymoses, or cyanosis                          11.0   8.2   )-----------( 363      ( 15 Aug 2019 04:19 )             36.4     08-15    134<L>  |  91<L>  |  32<H>  ----------------------------<  102<H>  4.0   |  32<H>  |  2.26<H>    Ca    8.6      15 Aug 2019 04:19  Phos  4.3     08-15  Mg     2.0     08-15    TPro  7.2  /  Alb  2.8<L>  /  TBili  0.3  /  DBili  x   /  AST  9<L>  /  ALT  10  /  AlkPhos  91  08-15          Serum Pro-Brain Natriuretic Peptide: 2459 pg/mL (08-09 @ 16:28)          New ECG(s): Personally reviewed    Echo:    Stress Testing:     Cath:    Imaging:    Interpretation of Telemetry: - doing well w/ no new/acute complaints  - no further episodes of VT     Medications:  ALBUTerol/ipratropium for Nebulization 3 milliLiter(s) Nebulizer every 6 hours  amiodarone    Tablet 200 milliGRAM(s) Oral daily  apixaban 5 milliGRAM(s) Oral two times a day  atorvastatin 40 milliGRAM(s) Oral at bedtime  buDESOnide 160 MICROgram(s)/formoterol 4.5 MICROgram(s) Inhaler 2 Puff(s) Inhalation two times a day  chlorhexidine 2% Cloths 1 Application(s) Topical <User Schedule>  docusate sodium 100 milliGRAM(s) Oral three times a day  finasteride 5 milliGRAM(s) Oral daily  metoprolol tartrate 50 milliGRAM(s) Oral every 8 hours  mexiletine 150 milliGRAM(s) Oral every 8 hours  nystatin Powder 1 Application(s) Topical two times a day PRN  pantoprazole    Tablet 40 milliGRAM(s) Oral before breakfast  potassium chloride    Tablet ER 20 milliEquivalent(s) Oral daily  senna 2 Tablet(s) Oral at bedtime  tamsulosin 0.4 milliGRAM(s) Oral at bedtime  tiotropium 18 MICROgram(s) Capsule 1 Capsule(s) Inhalation daily      PAST MEDICAL & SURGICAL HISTORY:  COPD (chronic obstructive pulmonary disease)  HTN (hypertension)  Simple chronic bronchitis  Essential hypertension  No significant past surgical history  No significant past surgical history        Vitals:  T(F): 97.4 (08-15), Max: 97.5 (08-15)  HR: 82 (08-15) (60 - 105)  BP: 93/58 (08-15) (73/53 - 115/73)  RR: 19 (08-15)  SpO2: 93% (08-15)  I&O's Summary    14 Aug 2019 07:01  -  15 Aug 2019 07:00  --------------------------------------------------------  IN: 1740 mL / OUT: 600 mL / NET: 1140 mL    15 Aug 2019 07:01  -  15 Aug 2019 22:34  --------------------------------------------------------  IN: 0 mL / OUT: 900 mL / NET: -900 mL        Physical Exam:  Appearance: No acute distress; well appearing  Eyes: PERRL, EOMI, pink conjunctiva  HENT: Normal oral mucosa  Cardiovascular: RRR, S1, S2, no murmurs, rubs, or gallops; no edema; no JVD  Respiratory: Clear to auscultation bilaterally  Gastrointestinal: soft, non-tender, non-distended with normal bowel sounds  Musculoskeletal: No clubbing; no joint deformity   Neurologic: Non-focal  Lymphatic: No lymphadenopathy  Psychiatry: AAOx3, mood & affect appropriate  Skin: No rashes, ecchymoses, or cyanosis                          11.0   8.2   )-----------( 363      ( 15 Aug 2019 04:19 )             36.4     08-15    134<L>  |  91<L>  |  32<H>  ----------------------------<  102<H>  4.0   |  32<H>  |  2.26<H>    Ca    8.6      15 Aug 2019 04:19  Phos  4.3     08-15  Mg     2.0     08-15    TPro  7.2  /  Alb  2.8<L>  /  TBili  0.3  /  DBili  x   /  AST  9<L>  /  ALT  10  /  AlkPhos  91  08-15          Serum Pro-Brain Natriuretic Peptide: 2459 pg/mL (08-09 @ 16:28)        Interpretation of Telemetry: afib, HR 70s

## 2019-08-15 NOTE — PROGRESS NOTE ADULT - SUBJECTIVE AND OBJECTIVE BOX
Patient is a 69y old  Male who presents with a chief complaint of episodes of VT shock ICD (14 Aug 2019 23:44)    HPI:  68 y/o M w/ pmhx of HTN, HLD, Gout, MORRO (on CPAP, Home O2 2L), afib, recently admitted for VT, s/p failed VT ablation now s/p ICD and sotalol load.  Now transferred from [facility] for syncope.  In [facility] ED ICD interrogated and found to have multiple episodes of VT.  Admitted for ICD firing. (09 Aug 2019 17:43)    Overnight Event:    REVIEW OF SYSTEMS:  	    MEDICATIONS  (STANDING):  ALBUTerol/ipratropium for Nebulization 3 milliLiter(s) Nebulizer every 6 hours  amiodarone    Tablet 200 milliGRAM(s) Oral daily  apixaban 5 milliGRAM(s) Oral two times a day  atorvastatin 40 milliGRAM(s) Oral at bedtime  buDESOnide 160 MICROgram(s)/formoterol 4.5 MICROgram(s) Inhaler 2 Puff(s) Inhalation two times a day  chlorhexidine 2% Cloths 1 Application(s) Topical <User Schedule>  docusate sodium 100 milliGRAM(s) Oral three times a day  finasteride 5 milliGRAM(s) Oral daily  metoprolol tartrate 50 milliGRAM(s) Oral every 8 hours  mexiletine 150 milliGRAM(s) Oral once  mexiletine 150 milliGRAM(s) Oral every 8 hours  pantoprazole    Tablet 40 milliGRAM(s) Oral before breakfast  potassium chloride    Tablet ER 20 milliEquivalent(s) Oral daily  senna 2 Tablet(s) Oral at bedtime  tamsulosin 0.4 milliGRAM(s) Oral at bedtime  tiotropium 18 MICROgram(s) Capsule 1 Capsule(s) Inhalation daily    MEDICATIONS  (PRN):  nystatin Powder 1 Application(s) Topical two times a day PRN as needed        PHYSICAL EXAM:  Vital Signs Last 24 Hrs  T(C): 36.3 (15 Aug 2019 07:30), Max: 36.4 (14 Aug 2019 19:16)  T(F): 97.4 (15 Aug 2019 07:30), Max: 97.6 (14 Aug 2019 19:16)  HR: 105 (15 Aug 2019 07:30) (72 - 105)  BP: 92/70 (15 Aug 2019 07:30) (70/50 - 121/91)  BP(mean): 77 (15 Aug 2019 07:30) (50 - 102)  RR: 20 (15 Aug 2019 07:30) (12 - 38)  SpO2: 93% (15 Aug 2019 07:30) (88% - 97%)  I&O's Summary    14 Aug 2019 07:01  -  15 Aug 2019 07:00  --------------------------------------------------------  IN: 1740 mL / OUT: 600 mL / NET: 1140 mL        Appearance: Normal	  HEENT:   Normal oral mucosa, PERRL, EOMI	  Lymphatic: No lymphadenopathy  Cardiovascular: Normal S1 S2, No JVD, No murmurs, No edema  Respiratory: Lungs clear to auscultation	  Psychiatry: A & O x 3, Mood & affect appropriate  Gastrointestinal:  Soft, Non-tender, + BS	  Skin: No rashes, No ecchymoses, No cyanosis	  Neurologic: Non-focal  Extremities: Normal range of motion, No clubbing, cyanosis or edema  Vascular: Peripheral pulses palpable 2+ bilaterally    LABS:	 	                        11.0   8.2   )-----------( 363      ( 15 Aug 2019 04:19 )             36.4     Auto Eosinophil # 0.2   / Auto Eosinophil % 2.7   / Auto Neutrophil # 5.4   / Auto Neutrophil % 66.4  / BANDS % x                            11.5   9.6   )-----------( 406      ( 14 Aug 2019 03:23 )             37.2     Auto Eosinophil # 0.2   / Auto Eosinophil % 2.5   / Auto Neutrophil # 6.6   / Auto Neutrophil % 68.9  / BANDS % x          08-15    134<L>  |  91<L>  |  32<H>  ----------------------------<  102<H>  4.0   |  32<H>  |  2.26<H>  08-14    133<L>  |  88<L>  |  24<H>  ----------------------------<  108<H>  3.7   |  34<H>  |  1.55<H>  08-13    134<L>  |  87<L>  |  19  ----------------------------<  111<H>  4.1   |  36<H>  |  1.28    Ca    8.6      15 Aug 2019 04:19  Mg     2.0     08-15  Phos  4.3     08-15  TPro  7.2  /  Alb  2.8<L>  /  TBili  0.3  /  DBili  x   /  AST  9<L>  /  ALT  10  /  AlkPhos  91  08-15  TPro  7.0  /  Alb  2.5<L>  /  TBili  0.3  /  DBili  x   /  AST  12  /  ALT  11  /  AlkPhos  97  08-14  TPro  7.1  /  Alb  3.0<L>  /  TBili  0.3  /  DBili  x   /  AST  14  /  ALT  11  /  AlkPhos  103  08-13      Lipid Profile: 07-06 Chol 117 LDL 48 HDL 55 Trig 68  HgA1c: 5.6 % (07-05 @ 20:16)      TELEMETRY: 	    ECG:  	  RADIOLOGY:  OTHER: 	    PREVIOUS DIAGNOSTIC TESTING:    [ ] Echocardiogram:  [ ]  Catheterization:  	  EMANI VailB[facility] Cardon Children[facility] CenterGENE  Contact # [phone] Patient is a 69y old  Male who presents with a chief complaint of episodes of VT shock ICD (14 Aug 2019 23:44)    HPI:  70 y/o M w/ pmhx of HTN, HLD, Gout, MORRO (on CPAP, Home O2 2L), afib, recently admitted for VT, s/p failed VT ablation now s/p ICD and sotalol load.  Now transferred from [facility] for syncope.  In [facility] ED ICD interrogated and found to have multiple episodes of VT.  Admitted for ICD firing. (09 Aug 2019 17:43)    Overnight Event: had 6 second runs of VT in 170-180's. Had SBP 70's and gave 1L IVF    REVIEW OF SYSTEMS: No chest pain, SOB or palpitations  	    MEDICATIONS  (STANDING):  ALBUTerol/ipratropium for Nebulization 3 milliLiter(s) Nebulizer every 6 hours  amiodarone    Tablet 200 milliGRAM(s) Oral daily  apixaban 5 milliGRAM(s) Oral two times a day  atorvastatin 40 milliGRAM(s) Oral at bedtime  buDESOnide 160 MICROgram(s)/formoterol 4.5 MICROgram(s) Inhaler 2 Puff(s) Inhalation two times a day  chlorhexidine 2% Cloths 1 Application(s) Topical <User Schedule>  docusate sodium 100 milliGRAM(s) Oral three times a day  finasteride 5 milliGRAM(s) Oral daily  metoprolol tartrate 50 milliGRAM(s) Oral every 8 hours  mexiletine 150 milliGRAM(s) Oral once  mexiletine 150 milliGRAM(s) Oral every 8 hours  pantoprazole    Tablet 40 milliGRAM(s) Oral before breakfast  potassium chloride    Tablet ER 20 milliEquivalent(s) Oral daily  senna 2 Tablet(s) Oral at bedtime  tamsulosin 0.4 milliGRAM(s) Oral at bedtime  tiotropium 18 MICROgram(s) Capsule 1 Capsule(s) Inhalation daily    MEDICATIONS  (PRN):  nystatin Powder 1 Application(s) Topical two times a day PRN as needed      PHYSICAL EXAM:  Vital Signs Last 24 Hrs  T(C): 36.3 (15 Aug 2019 07:30), Max: 36.4 (14 Aug 2019 19:16)  T(F): 97.4 (15 Aug 2019 07:30), Max: 97.6 (14 Aug 2019 19:16)  HR: 105 (15 Aug 2019 07:30) (72 - 105)  BP: 92/70 (15 Aug 2019 07:30) (70/50 - 121/91)  BP(mean): 77 (15 Aug 2019 07:30) (50 - 102)  RR: 20 (15 Aug 2019 07:30) (12 - 38)  SpO2: 93% (15 Aug 2019 07:30) (88% - 97%)  I&O's Summary    14 Aug 2019 07:01  -  15 Aug 2019 07:00  --------------------------------------------------------  IN: 1740 mL / OUT: 600 mL / NET: 1140 mL      Appearance: Normal	  HEENT:   Normal oral mucosa, PERRL, EOMI	  Cardiovascular: Normal S1 S2, No JVD, No murmurs, No edema  Respiratory: Lungs clear to auscultation	  Psychiatry: A & O x 3, Mood & affect appropriate  Gastrointestinal:  Obese, Soft, Non-tender, + BS	  Skin: No rashes, No ecchymoses, No cyanosis	  Neurologic: Non-focal  Extremities: Normal range of motion, No clubbing, cyanosis or edema  Vascular: Peripheral pulses palpable 2+ bilaterally    LABS:	 	                        11.0   8.2   )-----------( 363      ( 15 Aug 2019 04:19 )             36.4     Auto Eosinophil # 0.2   / Auto Eosinophil % 2.7   / Auto Neutrophil # 5.4   / Auto Neutrophil % 66.4  / BANDS % x                            11.5   9.6   )-----------( 406      ( 14 Aug 2019 03:23 )             37.2     Auto Eosinophil # 0.2   / Auto Eosinophil % 2.5   / Auto Neutrophil # 6.6   / Auto Neutrophil % 68.9  / BANDS % x          08-15    134<L>  |  91<L>  |  32<H>  ----------------------------<  102<H>  4.0   |  32<H>  |  2.26<H>  08-14    133<L>  |  88<L>  |  24<H>  ----------------------------<  108<H>  3.7   |  34<H>  |  1.55<H>  08-13    134<L>  |  87<L>  |  19  ----------------------------<  111<H>  4.1   |  36<H>  |  1.28    Ca    8.6      15 Aug 2019 04:19  Mg     2.0     08-15  Phos  4.3     08-15  TPro  7.2  /  Alb  2.8<L>  /  TBili  0.3  /  DBili  x   /  AST  9<L>  /  ALT  10  /  AlkPhos  91  08-15  TPro  7.0  /  Alb  2.5<L>  /  TBili  0.3  /  DBili  x   /  AST  12  /  ALT  11  /  AlkPhos  97  08-14  TPro  7.1  /  Alb  3.0<L>  /  TBili  0.3  /  DBili  x   /  AST  14  /  ALT  11  /  AlkPhos  103  08-13      Lipid Profile: 07-06 Chol 117 LDL 48 HDL 55 Trig 68  HgA1c: 5.6 % (07-05 @ 20:16)      TELEMETRY: 	  Runs of VT  ECG:  	A. fib with PVC's  	    PREVIOUS DIAGNOSTIC TESTING:    [ ] Echocardiogram: < from: TTE with Doppler (w/Cont) (08.09.19 @ 17:39) >  Conclusions:  1. Mitral annular calcification, otherwise normal mitral  valve. Mild mitral regurgitation.  2. Calcified trileaflet aortic valve with normal opening.  Minimal aortic regurgitation.  3. Mild concentric left ventricular hypertrophy.  4. Endocardial visualization enhanced with intravenous  injection of Ultrasonic Enhancing Agent (Definity).  Moderate segmental left ventricular systolic dysfunction.  No left ventricular thrombus. The mid anterior septum, the  apical inferior wall, the apical anterior wall, the apical  septum, the apical lateral wall, the mid anterior wall, and  the mid inferoseptum are hypokinetic.  5. Mild diastolic dysfunction (Stage I).  6. The right ventricle is not well visualized.  7. Normal pericardium with no pericardial effusion.  *** Compared with echocardiogram of 7/6/2019, no  significant changes noted.    < end of copied text >    [ ]  Catheterization: < from: Cardiac Cath Lab - Adult (07.08.19 @ 15:35) >  CORONARY VESSELS: The coronary circulation is left dominant.  LM:   --  LM: Normal.  LAD:   --  LAD: Normal.  CX:   --  Circumflex: Normal.  RCA:   --  RCA: Normal.    < end of copied text >    	  EMANI [facility]ailUnity [facility]  Contact # [phone]

## 2019-08-15 NOTE — PROGRESS NOTE ADULT - ATTENDING COMMENTS
Patient is seen and examined with fellow, NP and the CCU house-staff. I agree with the history, physical and the assessment and plan.  more episodes of VT - will start mexilitine 150 tid  will c/w amiodaorne and BB  if continues to have more VT, will re-address the possible ablation  monitor renal function - will avoid hypotensive episodes Patient is seen and examined with fellow, NP and the CCU house-staff. I agree with the history, physical and the assessment and plan.  more episodes of VT - will start Mexiletine 150 tid  will c/w amiodarone and BB  if continues to have more VT, will re-address the possible ablation  monitor renal function - will avoid hypotensive episodes

## 2019-08-15 NOTE — PROGRESS NOTE ADULT - ASSESSMENT
70 y/o M w/ pmhx of HTN, HLD, Gout, MORRO (on CPAP, Home O2 2L), afib, recently admitted for VT, s/p failed VT ablation now s/p ICD and sotalol load.  Now transferred from [facility] for syncope.  In [facility] ED ICD interrogated and found to have multiple episodes of VT.  Admitted for ICD firing. Now s/p Amiodarone load and BB initiation with further episodes of VT.

## 2019-08-16 ENCOUNTER — APPOINTMENT (OUTPATIENT)
Dept: ELECTROPHYSIOLOGY | Facility: CLINIC | Age: 69
End: 2019-08-16

## 2019-08-16 ENCOUNTER — TRANSCRIPTION ENCOUNTER (OUTPATIENT)
Age: 69
End: 2019-08-16

## 2019-08-16 VITALS
RESPIRATION RATE: 23 BRPM | OXYGEN SATURATION: 91 % | HEART RATE: 77 BPM | DIASTOLIC BLOOD PRESSURE: 53 MMHG | SYSTOLIC BLOOD PRESSURE: 99 MMHG

## 2019-08-16 LAB
ALBUMIN SERPL ELPH-MCNC: 3.1 G/DL — LOW (ref 3.3–5)
ALP SERPL-CCNC: 95 U/L — SIGNIFICANT CHANGE UP (ref 40–120)
ALT FLD-CCNC: 10 U/L — SIGNIFICANT CHANGE UP (ref 10–45)
ANION GAP SERPL CALC-SCNC: 10 MMOL/L — SIGNIFICANT CHANGE UP (ref 5–17)
AST SERPL-CCNC: 21 U/L — SIGNIFICANT CHANGE UP (ref 10–40)
BASOPHILS # BLD AUTO: 0 K/UL — SIGNIFICANT CHANGE UP (ref 0–0.2)
BASOPHILS NFR BLD AUTO: 0.2 % — SIGNIFICANT CHANGE UP (ref 0–2)
BILIRUB SERPL-MCNC: 0.3 MG/DL — SIGNIFICANT CHANGE UP (ref 0.2–1.2)
BUN SERPL-MCNC: 29 MG/DL — HIGH (ref 7–23)
CALCIUM SERPL-MCNC: 9 MG/DL — SIGNIFICANT CHANGE UP (ref 8.4–10.5)
CHLORIDE SERPL-SCNC: 93 MMOL/L — LOW (ref 96–108)
CO2 SERPL-SCNC: 30 MMOL/L — SIGNIFICANT CHANGE UP (ref 22–31)
CREAT SERPL-MCNC: 1.52 MG/DL — HIGH (ref 0.5–1.3)
EOSINOPHIL # BLD AUTO: 0.2 K/UL — SIGNIFICANT CHANGE UP (ref 0–0.5)
EOSINOPHIL NFR BLD AUTO: 2.9 % — SIGNIFICANT CHANGE UP (ref 0–6)
GLUCOSE SERPL-MCNC: 90 MG/DL — SIGNIFICANT CHANGE UP (ref 70–99)
HCT VFR BLD CALC: 36.9 % — LOW (ref 39–50)
HGB BLD-MCNC: 11.7 G/DL — LOW (ref 13–17)
LYMPHOCYTES # BLD AUTO: 1.8 K/UL — SIGNIFICANT CHANGE UP (ref 1–3.3)
LYMPHOCYTES # BLD AUTO: 22.1 % — SIGNIFICANT CHANGE UP (ref 13–44)
MAGNESIUM SERPL-MCNC: 2.1 MG/DL — SIGNIFICANT CHANGE UP (ref 1.6–2.6)
MCHC RBC-ENTMCNC: 25.6 PG — LOW (ref 27–34)
MCHC RBC-ENTMCNC: 31.6 GM/DL — LOW (ref 32–36)
MCV RBC AUTO: 80.9 FL — SIGNIFICANT CHANGE UP (ref 80–100)
MONOCYTES # BLD AUTO: 0.6 K/UL — SIGNIFICANT CHANGE UP (ref 0–0.9)
MONOCYTES NFR BLD AUTO: 7.1 % — SIGNIFICANT CHANGE UP (ref 2–14)
NEUTROPHILS # BLD AUTO: 5.6 K/UL — SIGNIFICANT CHANGE UP (ref 1.8–7.4)
NEUTROPHILS NFR BLD AUTO: 67.7 % — SIGNIFICANT CHANGE UP (ref 43–77)
PHOSPHATE SERPL-MCNC: 3.2 MG/DL — SIGNIFICANT CHANGE UP (ref 2.5–4.5)
PLATELET # BLD AUTO: 387 K/UL — SIGNIFICANT CHANGE UP (ref 150–400)
POTASSIUM SERPL-MCNC: 4.7 MMOL/L — SIGNIFICANT CHANGE UP (ref 3.5–5.3)
POTASSIUM SERPL-SCNC: 4.7 MMOL/L — SIGNIFICANT CHANGE UP (ref 3.5–5.3)
PROT SERPL-MCNC: 7.5 G/DL — SIGNIFICANT CHANGE UP (ref 6–8.3)
RBC # BLD: 4.56 M/UL — SIGNIFICANT CHANGE UP (ref 4.2–5.8)
RBC # FLD: 14.5 % — SIGNIFICANT CHANGE UP (ref 10.3–14.5)
SODIUM SERPL-SCNC: 133 MMOL/L — LOW (ref 135–145)
WBC # BLD: 8.3 K/UL — SIGNIFICANT CHANGE UP (ref 3.8–10.5)
WBC # FLD AUTO: 8.3 K/UL — SIGNIFICANT CHANGE UP (ref 3.8–10.5)

## 2019-08-16 PROCEDURE — 97161 PT EVAL LOW COMPLEX 20 MIN: CPT

## 2019-08-16 PROCEDURE — 85610 PROTHROMBIN TIME: CPT

## 2019-08-16 PROCEDURE — 82803 BLOOD GASES ANY COMBINATION: CPT

## 2019-08-16 PROCEDURE — 94660 CPAP INITIATION&MGMT: CPT

## 2019-08-16 PROCEDURE — 36415 COLL VENOUS BLD VENIPUNCTURE: CPT

## 2019-08-16 PROCEDURE — 99285 EMERGENCY DEPT VISIT HI MDM: CPT | Mod: 25

## 2019-08-16 PROCEDURE — 82947 ASSAY GLUCOSE BLOOD QUANT: CPT

## 2019-08-16 PROCEDURE — 87040 BLOOD CULTURE FOR BACTERIA: CPT

## 2019-08-16 PROCEDURE — 71045 X-RAY EXAM CHEST 1 VIEW: CPT

## 2019-08-16 PROCEDURE — 84295 ASSAY OF SERUM SODIUM: CPT

## 2019-08-16 PROCEDURE — 84100 ASSAY OF PHOSPHORUS: CPT

## 2019-08-16 PROCEDURE — 82330 ASSAY OF CALCIUM: CPT

## 2019-08-16 PROCEDURE — 81001 URINALYSIS AUTO W/SCOPE: CPT

## 2019-08-16 PROCEDURE — 93005 ELECTROCARDIOGRAM TRACING: CPT

## 2019-08-16 PROCEDURE — 87633 RESP VIRUS 12-25 TARGETS: CPT

## 2019-08-16 PROCEDURE — 84132 ASSAY OF SERUM POTASSIUM: CPT

## 2019-08-16 PROCEDURE — 80053 COMPREHEN METABOLIC PANEL: CPT

## 2019-08-16 PROCEDURE — 87798 DETECT AGENT NOS DNA AMP: CPT

## 2019-08-16 PROCEDURE — 87486 CHLMYD PNEUM DNA AMP PROBE: CPT

## 2019-08-16 PROCEDURE — 71250 CT THORAX DX C-: CPT

## 2019-08-16 PROCEDURE — 83735 ASSAY OF MAGNESIUM: CPT

## 2019-08-16 PROCEDURE — 99233 SBSQ HOSP IP/OBS HIGH 50: CPT | Mod: GC

## 2019-08-16 PROCEDURE — 82435 ASSAY OF BLOOD CHLORIDE: CPT

## 2019-08-16 PROCEDURE — 85730 THROMBOPLASTIN TIME PARTIAL: CPT

## 2019-08-16 PROCEDURE — 93010 ELECTROCARDIOGRAM REPORT: CPT

## 2019-08-16 PROCEDURE — 85027 COMPLETE CBC AUTOMATED: CPT

## 2019-08-16 PROCEDURE — 97110 THERAPEUTIC EXERCISES: CPT

## 2019-08-16 PROCEDURE — 97530 THERAPEUTIC ACTIVITIES: CPT

## 2019-08-16 PROCEDURE — 83605 ASSAY OF LACTIC ACID: CPT

## 2019-08-16 PROCEDURE — 84484 ASSAY OF TROPONIN QUANT: CPT

## 2019-08-16 PROCEDURE — 97116 GAIT TRAINING THERAPY: CPT

## 2019-08-16 PROCEDURE — 87581 M.PNEUMON DNA AMP PROBE: CPT

## 2019-08-16 PROCEDURE — C8929: CPT

## 2019-08-16 PROCEDURE — 83880 ASSAY OF NATRIURETIC PEPTIDE: CPT

## 2019-08-16 PROCEDURE — 94640 AIRWAY INHALATION TREATMENT: CPT

## 2019-08-16 PROCEDURE — 85014 HEMATOCRIT: CPT

## 2019-08-16 RX ORDER — AMIODARONE HYDROCHLORIDE 400 MG/1
1 TABLET ORAL
Qty: 90 | Refills: 0
Start: 2019-08-16 | End: 2019-11-13

## 2019-08-16 RX ORDER — APIXABAN 2.5 MG/1
1 TABLET, FILM COATED ORAL
Qty: 180 | Refills: 0
Start: 2019-08-16 | End: 2019-11-13

## 2019-08-16 RX ORDER — FUROSEMIDE 40 MG
1 TABLET ORAL
Qty: 0 | Refills: 0 | DISCHARGE

## 2019-08-16 RX ORDER — APIXABAN 2.5 MG/1
1 TABLET, FILM COATED ORAL
Qty: 0 | Refills: 0 | DISCHARGE

## 2019-08-16 RX ORDER — MEXILETINE HYDROCHLORIDE 150 MG/1
1 CAPSULE ORAL
Qty: 0 | Refills: 0 | DISCHARGE
Start: 2019-08-16

## 2019-08-16 RX ORDER — AMIODARONE HYDROCHLORIDE 400 MG/1
1 TABLET ORAL
Qty: 0 | Refills: 0 | DISCHARGE
Start: 2019-08-16

## 2019-08-16 RX ORDER — MEXILETINE HYDROCHLORIDE 150 MG/1
1 CAPSULE ORAL
Qty: 270 | Refills: 0
Start: 2019-08-16 | End: 2019-11-13

## 2019-08-16 RX ORDER — METOPROLOL TARTRATE 50 MG
1 TABLET ORAL
Qty: 270 | Refills: 0
Start: 2019-08-16 | End: 2019-11-13

## 2019-08-16 RX ADMIN — Medication 3 MILLILITER(S): at 06:42

## 2019-08-16 RX ADMIN — APIXABAN 5 MILLIGRAM(S): 2.5 TABLET, FILM COATED ORAL at 05:45

## 2019-08-16 RX ADMIN — CHLORHEXIDINE GLUCONATE 1 APPLICATION(S): 213 SOLUTION TOPICAL at 05:46

## 2019-08-16 RX ADMIN — MEXILETINE HYDROCHLORIDE 150 MILLIGRAM(S): 150 CAPSULE ORAL at 11:42

## 2019-08-16 RX ADMIN — MEXILETINE HYDROCHLORIDE 150 MILLIGRAM(S): 150 CAPSULE ORAL at 05:45

## 2019-08-16 RX ADMIN — FINASTERIDE 5 MILLIGRAM(S): 5 TABLET, FILM COATED ORAL at 11:43

## 2019-08-16 RX ADMIN — Medication 3 MILLILITER(S): at 11:41

## 2019-08-16 RX ADMIN — Medication 50 MILLIGRAM(S): at 05:46

## 2019-08-16 RX ADMIN — TIOTROPIUM BROMIDE 1 CAPSULE(S): 18 CAPSULE ORAL; RESPIRATORY (INHALATION) at 11:43

## 2019-08-16 RX ADMIN — AMIODARONE HYDROCHLORIDE 200 MILLIGRAM(S): 400 TABLET ORAL at 05:45

## 2019-08-16 RX ADMIN — Medication 50 MILLIGRAM(S): at 11:42

## 2019-08-16 RX ADMIN — Medication 20 MILLIEQUIVALENT(S): at 11:42

## 2019-08-16 RX ADMIN — BUDESONIDE AND FORMOTEROL FUMARATE DIHYDRATE 2 PUFF(S): 160; 4.5 AEROSOL RESPIRATORY (INHALATION) at 06:42

## 2019-08-16 RX ADMIN — PANTOPRAZOLE SODIUM 40 MILLIGRAM(S): 20 TABLET, DELAYED RELEASE ORAL at 05:46

## 2019-08-16 NOTE — PROGRESS NOTE ADULT - ASSESSMENT
70 y/o M w/ pmhx of HTN, HLD, Gout, MORRO (on CPAP, Home O2 2L), afib, recently admitted for VT, s/p failed VT ablation now s/p ICD and sotalol load.  Now transferred from [facility] for syncope.  In [facility] ED ICD interrogated and found to have multiple episodes of VT.  Admitted for ICD firing. S/P AMIO LOAD

## 2019-08-16 NOTE — DISCHARGE NOTE PROVIDER - NSDCCPCAREPLAN_GEN_ALL_CORE_FT
PRINCIPAL DISCHARGE DIAGNOSIS  Diagnosis: Ventricular tachycardia  Assessment and Plan of Treatment: Continue metoprolol 50mg orally three times a day  Continue Mexiletin 150mg orally three times a day  Continue Amiodarone 200mg orally once a day  Follow up with your cardiologist and primary care physician in 1-2 weeks

## 2019-08-16 NOTE — PROGRESS NOTE ADULT - SUBJECTIVE AND OBJECTIVE BOX
CCU2 NOTE    Admission date: 8/9/19  Chief complaint/ Diagnosis: VT   HPI: 68 y/o M w/ pmhx of HTN, HLD, Gout, MORRO (on CPAP, Home O2 2L), afib, recently admitted for VT, s/p failed VT ablation now s/p ICD and sotalol load.  Now transferred from [facility] for syncope.  In [facility] ED ICD interrogated and found to have multiple episodes of VT.  Admitted for ICD firing. S/P AMIO LOAD     Interval history: Uneventful overnight  No further VT episodes    REVIEW OF SYSTEMS  Denies CP, Palpitation, SOB, Dyspnea [ x ] All other systems negative    MEDICATIONS  (STANDING)  ALBUTerol/ipratropium for Nebulization 3 milliLiter(s) Nebulizer every 6 hours  amiodarone    Tablet 200 milliGRAM(s) Oral daily  apixaban 5 milliGRAM(s) Oral two times a day  atorvastatin 40 milliGRAM(s) Oral at bedtime  buDESOnide 160 MICROgram(s)/formoterol 4.5 MICROgram(s) Inhaler 2 Puff(s) Inhalation two times a day  chlorhexidine 2% Cloths 1 Application(s) Topical <User Schedule>  docusate sodium 100 milliGRAM(s) Oral three times a day  finasteride 5 milliGRAM(s) Oral daily  metoprolol tartrate 50 milliGRAM(s) Oral every 8 hours  mexiletine 150 milliGRAM(s) Oral every 8 hours  pantoprazole    Tablet 40 milliGRAM(s) Oral before breakfast  potassium chloride    Tablet ER 20 milliEquivalent(s) Oral daily  senna 2 Tablet(s) Oral at bedtime  tamsulosin 0.4 milliGRAM(s) Oral at bedtime  tiotropium 18 MICROgram(s) Capsule 1 Capsule(s) Inhalation daily    MEDICATIONS  (PRN):  nystatin Powder 1 Application(s) Topical two times a day PRN as needed    Allergy: No Known Allergies    ICU Vital Signs Last 24 Hrs  T(C): 36.6 (Max: 36.6)  HR: 72  (59 - 86)  BP: 98/55(75/55 - 107/56)  RR: 22  (14 - 27)  SpO2: 93%  (89% - 98%) in room air     I&O's Summary  IN: 0 mL / OUT: 1450 mL /-1450      PHYSICAL EXAM  Appearance: Normal, NAD  HEAD:  Normocephalic  EYES:PERRLA, conjunctiva and sclera clear  NECK: Supple, No JVD  CHEST/LUNG: Clear to auscultation bilaterally; No wheezing  HEART: Normal S1, S2. No murmurs, rubs, or gallops  ABDOMEN: + Bowel sounds, Soft, NT, ND   EXTREMITIES:  2+ Peripheral Pulses, No clubbing, cyanosis, or edema  NEUROLOGY: non-focal, aaox3  SKIN: No rashes or lesions    Interpretation of Telemetry: NSR                         11.7   8.3   )-----------( 387                   36.9       133<L>  |  93<L>  |  29<H>  ----------------------------<  90  4.7   |  30  |  1.52<2.26<1.55    Ca    9.0     Phos  3.2     Mg     2.1       TPro  7.5  /  Alb  3.1<L>  /  TBili  0.3  /  DBili  x   /  AST  21  /  ALT  10  /  AlkPhos  95

## 2019-08-16 NOTE — PROGRESS NOTE ADULT - ASSESSMENT
74 year male with nonischemic CM ,CHF, EF 40-45%, COPD, VT persistent AFIB with recurrent VT on sotalol, Qt  467, mild hypokalemia, worsening respiratory status x 3 days iwth CT showing loculated effusion, possible infiltrate, received amiodarone, metoprolol and furosemide, Now with recurrent short bursts of VT, . placed on increased metoprolol , ACE and lasix,  diamox, now with acute renal failure.  recurrent runs of nonsustained VT - mexitil added, improved.

## 2019-08-16 NOTE — PROGRESS NOTE ADULT - REASON FOR ADMISSION
VT
episodes of VT shock ICD

## 2019-08-16 NOTE — DISCHARGE NOTE PROVIDER - PROVIDER TOKENS
PROVIDER:[TOKEN:[[id number]]] PROVIDER:[TOKEN:[[id number]]],PROVIDER:[[id number]]]

## 2019-08-16 NOTE — PROGRESS NOTE ADULT - PROBLEM SELECTOR PROBLEM 1
Ventricular tachycardia

## 2019-08-16 NOTE — DISCHARGE NOTE PROVIDER - HOSPITAL COURSE
69M hx HTN, HLD, Gout, MORRO (on CPAP, Home O2 2L), afib, recently adm for VT, s/p failed VT ablation s/p ICD and sotalol load.  Now tx from [place] for syncope. ICD interrogation showed multiple episodes of VT.  Sotalol d/c and pt s/p Amio load.  Echo shows LVH w/ moderate segmental LVSD W/ ef 45%. Pt remains hemodynamically stable and No further vt episode . Pt will be discharged w/ Eliquis, Amio 200 Po daily, Lopressor 50 TID, and Mexiletine 150 TID. Pt will be discharged to rehab and will follow up w/ his PCP in 1-2 WEEKS 69M hx HTN, HLD, Gout, MORRO (on CPAP, Home O2 2L), afib, recently adm for VT, s/p failed VT ablation s/p ICD and sotalol load.  Now tx from [place] for syncope. ICD interrogation showed multiple episodes of VT.  Sotalol d/c and pt s/p Amio load.  Home dose lasix was d/c'd due to hypotension. Echo shows LVH w/ moderate segmental LVSD W/ ef 45%. Pt remains hemodynamically stable and No further vt episode . Pt appears to be euvolemic Pt will be discharged w/ Eliquis, Amio 200 Po daily, Lopressor 50 TID, and Mexiletine 150 TID. Pt will be discharged to rehab and will follow up w/ his PCP in 1-2 WEEKS

## 2019-08-16 NOTE — PROGRESS NOTE ADULT - PROBLEM SELECTOR PROBLEM 2
Acute on chronic systolic (congestive) heart failure
COPD (chronic obstructive pulmonary disease)
Acute on chronic systolic (congestive) heart failure
COPD (chronic obstructive pulmonary disease)
BIANKA (acute kidney injury)
Acute on chronic systolic (congestive) heart failure
Atrial fibrillation, permanent
COPD (chronic obstructive pulmonary disease)

## 2019-08-16 NOTE — PROGRESS NOTE ADULT - PROBLEM SELECTOR PLAN 5
see above
continue Duoneb Q6  continue Symbicort  continue Spiriva

## 2019-08-16 NOTE — PROGRESS NOTE ADULT - PROBLEM SELECTOR PLAN 2
improved respiratory status post Lasix IV.  continue lasix 40 IV daily, add lisinopril 2.5 mg daily
improved respiratory status. lasix increased and diamox x2 given. lisinopril increased to 5 daily
improved respiratory status post Lasix IV. Bp mildly reduced. ? was patient on ACE/ARB or Entresto in past? once BP stabilised, will attempt Entresto 24/26
- Advanced COPD  - Pulm consult Recommendations: Monitor off Abx, RVP (-)  -Continue Spiriva and Symbicort  -8/10 CT Chest for lung baseline: partially loculated R Pleural effusion, Right middle Lobe and lower lobe lung capacity which may represent PNA
- Advanced COPD  - Pulm consult Recommendations: Monitor off Abx, RVP (-)  -Continue Spiriva and Symbicort  -8/10 CT Chest for lung baseline: partially loculated R Pleural effusion, Right middle Lobe and lower lobe lung capacity which may represent PNA
hypotensive with patient receiving diuresis. Lasix, lisinopril on hold,  please maintain SBP > 100, MAP >70
hypotensive with patient receiving diuresis. Lasix, lisinopril on hold, receiving fluid bolus
improved respiratory status post Lasix IV. Bp mildly reduced. ? was patient on ACE/ARB or Entresto in past? once BP stabilised, will attempt Entresto 24/26
s/p IVF  Lasix on hold  Lisinopril on hold
continue Amiodarone  continue Lopressor  continue Eliquis
decrease Lasix to 40mg PO QD  Give Diamox 250mg IVx1 for contracture alkalosis
- Advanced COPD  - Pulm consult

## 2019-08-16 NOTE — PROGRESS NOTE ADULT - PROVIDER SPECIALTY LIST ADULT
CCU
Cardiology
Electrophysiology
Pulmonology
CCU
Electrophysiology

## 2019-08-16 NOTE — DISCHARGE NOTE PROVIDER - NSDCFUADDINST_GEN_ALL_CORE_FT
Follow up with your primary care Provider in 1 -2 weeks Follow up with Dr. Kallie Vivar in a week to check your fluid status

## 2019-08-16 NOTE — PROGRESS NOTE ADULT - PROBLEM SELECTOR PLAN 1
continue metoprolol 25 TID, continue amiodarone load 400 TID x 12 doses, then 200 mg daily,
continue metoprolol 25 TID, continue amiodarone load 400 TID x 12 doses, then 200 mg daily,
Increase metoprolol to 25 TID, continue amiodarone load 400 TID x 12 doses, then 200 mg daily
- Continue Amio 10 G load, will obtain baseline non-CT of the chest   - Continue Metoprolol, off sotalol  Maintain electrolytes stable
- Continue Amio 10 G load, will obtain baseline non-CT of the chest   - Continue Metoprolol, off sotalol  Maintain electrolytes stable
No further episode overnight  - Cont. Amio and Mexiletine  - Follow up w/ EP  - Possible discharge today to rehab if placement is done  - Ambulate as tolerated
continue amiodarone load 400 TID x 12 doses, then 200 mg daily. On metoprolol 25 BID. IF heart rate or Bp reduced, will decrease to 12,5 BID and eventually change to XL. check baseline TFTs
continue amiodarone, Hold metoprolol until BP stabilized. y,
continue amiodarone, mexitil - Hold metoprolol until BP stabilized.
Start Mexiletine 150mg q8  continue with Amiodarone 200mg QD  continue with lopressor 50mg Q8  f/u with EP
continue Amiodarone 200mg QD now after 10gram load  continue lopressor 25mg Q8
s/p Amiodarone load.  Now on 200mg QD  Will increase Lopressor to 50 mg q8  continue to monitor to consider additional anti arrythmic
- Continue Amio 10 G load, will obtain baseline non-CT of the chest   - Continue Metoprolol, off sotalol  Maintain electrolytes stable

## 2019-08-16 NOTE — PROGRESS NOTE ADULT - PROBLEM SELECTOR PROBLEM 3
Atrial fibrillation, permanent
Acute systolic heart failure
Chronic obstructive pulmonary disease with acute exacerbation

## 2019-08-16 NOTE — PROGRESS NOTE ADULT - PROBLEM SELECTOR PROBLEM 4
Chronic obstructive pulmonary disease with acute exacerbation
Acute systolic heart failure
Chronic obstructive pulmonary disease with acute exacerbation

## 2019-08-16 NOTE — PROGRESS NOTE ADULT - PROBLEM SELECTOR PROBLEM 5
Essential hypertension
Chronic obstructive pulmonary disease with acute exacerbation

## 2019-08-16 NOTE — DISCHARGE NOTE PROVIDER - CARE PROVIDER_API CALL
Maurice Gong)  Geriatric Medicine; Internal Medicine  [address] [place] [address]  Phone: [phone]  Fax: [phone]  Follow Up Time: Maurice Gong)  Geriatric Medicine; Internal Medicine  [address] [facility], [address] [place] [address]  Phone: [phone]  Fax: [phone]  Follow Up Time:     Corina Vivar)  Cardiology  [address] [facility] [address], [address]  [place] [address]  Phone: [phone]  Fax: [phone]  Follow Up Time:

## 2019-08-16 NOTE — DISCHARGE NOTE PROVIDER - CARE PROVIDERS DIRECT ADDRESSES
[email or web address]

## 2019-08-16 NOTE — PROGRESS NOTE ADULT - PROBLEM SELECTOR PLAN 3
continue Eliquis 5 BID, continue metoprolol
continue Eliquis 5 BID
continue Eliquis 5 BID
continue Eliquis 5 BID, continue metoprolol
continue Eliquis 5mg q12  continue with Amiodarone 200mg QD  continue with lopressor 50mg Q8
continue Duoneb Q6  continue Symbicort
continue Lasix  decrease lisinopril to 2.5 mg in setting of increased lopressor dose

## 2019-08-16 NOTE — PROGRESS NOTE ADULT - ATTENDING COMMENTS
Patient is seen and examined with fellow, NP and the CCU house-staff. I agree with the history, physical and the assessment and plan.  no ectopy overnight - better controlled on the mexilitine  c/w amiodaorne and the BB  on eliquis for the afib

## 2019-08-16 NOTE — PROGRESS NOTE ADULT - SUBJECTIVE AND OBJECTIVE BOX
INTERVAL HPI/OVERNIGHT EVENTS: patient resting, VT improved with combination mexitil and amiodarone    acute renal failure post hypotension associated with diuresis and ACE use    MEDICATIONS  (STANDING):  ALBUTerol/ipratropium for Nebulization 3 milliLiter(s) Nebulizer every 6 hours  amiodarone    Tablet 200 milliGRAM(s) Oral daily  apixaban 5 milliGRAM(s) Oral two times a day  atorvastatin 40 milliGRAM(s) Oral at bedtime  buDESOnide 160 MICROgram(s)/formoterol 4.5 MICROgram(s) Inhaler 2 Puff(s) Inhalation two times a day  chlorhexidine 2% Cloths 1 Application(s) Topical <User Schedule>  docusate sodium 100 milliGRAM(s) Oral three times a day  finasteride 5 milliGRAM(s) Oral daily  metoprolol tartrate 50 milliGRAM(s) Oral every 8 hours  mexiletine 150 milliGRAM(s) Oral every 8 hours  pantoprazole    Tablet 40 milliGRAM(s) Oral before breakfast  potassium chloride    Tablet ER 20 milliEquivalent(s) Oral daily  senna 2 Tablet(s) Oral at bedtime  tamsulosin 0.4 milliGRAM(s) Oral at bedtime  tiotropium 18 MICROgram(s) Capsule 1 Capsule(s) Inhalation daily    MEDICATIONS  (PRN):  nystatin Powder 1 Application(s) Topical two times a day PRN as needed      Allergies    No Known Allergies    Intolerances      ROS:   	  	  	    	            Vital Signs Last 24 Hrs  T(C): 36.3 (15 Aug 2019 21:18), Max: 36.4 (15 Aug 2019 06:00)  T(F): 97.4 (15 Aug 2019 21:18), Max: 97.5 (15 Aug 2019 06:00)  HR: 71 (15 Aug 2019 23:00) (60 - 105)  BP: 98/54 (15 Aug 2019 23:00) (75/55 - 115/73)  BP(mean): 73 (15 Aug 2019 23:00) (60 - 88)  RR: 16 (15 Aug 2019 23:00) (14 - 31)  SpO2: 89% (15 Aug 2019 23:00) (88% - 97%)  Daily     Daily Weight in k.3 (15 Aug 2019 06:00)    08-14 @ 07:01  -  08-15 @ 07:00  --------------------------------------------------------  IN: 1740 mL / OUT: 600 mL / NET: 1140 mL    08-15 @ 07:01  16 @ 00:19  --------------------------------------------------------  IN: 0 mL / OUT: 900 mL / NET: -900 mL      Physical Exam:    wdwn male  no JVD  cor irregularly irregular  lung clear  abd soft   ext no edema      LABS:                        11.0   8.2   )-----------( 363      ( 15 Aug 2019 04:19 )             36.4     08-15    134<L>  |  91<L>  |  32<H>  ----------------------------<  102<H>  4.0   |  32<H>  |  2.26<H>    Ca    8.6      15 Aug 2019 04:19  Phos  4.3     08-15  Mg     2.0     08-15    TPro  7.2  /  Alb  2.8<L>  /  TBili  0.3  /  DBili  x   /  AST  9<L>  /  ALT  10  /  AlkPhos  91  08-15          RADIOLOGY & ADDITIONAL TESTS:    TELE:    EKG:

## 2019-08-28 ENCOUNTER — APPOINTMENT (OUTPATIENT)
Dept: INTERNAL MEDICINE | Facility: CLINIC | Age: 69
End: 2019-08-28
Payer: MEDICARE

## 2019-08-28 VITALS
WEIGHT: 284 LBS | HEART RATE: 81 BPM | DIASTOLIC BLOOD PRESSURE: 68 MMHG | HEIGHT: 72 IN | TEMPERATURE: 98.1 F | BODY MASS INDEX: 38.47 KG/M2 | SYSTOLIC BLOOD PRESSURE: 94 MMHG | OXYGEN SATURATION: 78 % | RESPIRATION RATE: 18 BRPM

## 2019-08-28 VITALS — OXYGEN SATURATION: 84 %

## 2019-08-28 DIAGNOSIS — Z86.79 PERSONAL HISTORY OF OTHER DISEASES OF THE CIRCULATORY SYSTEM: ICD-10-CM

## 2019-08-28 PROCEDURE — 99215 OFFICE O/P EST HI 40 MIN: CPT

## 2019-08-28 RX ORDER — FUROSEMIDE 40 MG/1
40 TABLET ORAL
Qty: 90 | Refills: 4 | Status: DISCONTINUED | COMMUNITY
Start: 2018-05-24 | End: 2019-08-27

## 2019-08-28 RX ORDER — POTASSIUM CHLORIDE 1500 MG/1
20 TABLET, EXTENDED RELEASE ORAL
Qty: 90 | Refills: 1 | Status: DISCONTINUED | COMMUNITY
Start: 2018-05-24 | End: 2019-08-28

## 2019-08-28 RX ORDER — AMIODARONE HYDROCHLORIDE 200 MG/1
200 TABLET ORAL DAILY
Refills: 0 | Status: ACTIVE | COMMUNITY

## 2019-08-28 RX ORDER — MEXILETINE HYDROCHLORIDE 150 MG/1
150 CAPSULE ORAL 3 TIMES DAILY
Refills: 0 | Status: ACTIVE | COMMUNITY

## 2019-08-28 RX ORDER — FLUTICASONE FUROATE, UMECLIDINIUM BROMIDE AND VILANTEROL TRIFENATATE 100; 62.5; 25 UG/1; UG/1; UG/1
100-62.5-25 POWDER RESPIRATORY (INHALATION)
Refills: 0 | Status: DISCONTINUED | COMMUNITY
End: 2019-08-27

## 2019-08-28 RX ORDER — ALBUTEROL SULFATE 2.5 MG/3ML
(2.5 MG/3ML) SOLUTION RESPIRATORY (INHALATION)
Qty: 3 | Refills: 0 | Status: ACTIVE | COMMUNITY
Start: 2018-09-11

## 2019-08-28 RX ORDER — DILTIAZEM HYDROCHLORIDE 180 MG/1
180 TABLET, EXTENDED RELEASE ORAL DAILY
Qty: 30 | Refills: 2 | Status: DISCONTINUED | COMMUNITY
Start: 2019-06-26 | End: 2019-08-27

## 2019-08-28 RX ORDER — ALFUZOSIN HYDROCHLORIDE 10 MG/1
10 TABLET, EXTENDED RELEASE ORAL
Qty: 90 | Refills: 0 | Status: DISCONTINUED | COMMUNITY
Start: 2017-02-06 | End: 2019-08-27

## 2019-08-28 NOTE — PLAN
[FreeTextEntry1] : - In view of history of severe COPD on supplemental oxygen, hypoxia and mild hypotension, I recommend  \par    - Consider switch to low dose CCB ( patient was previously on diltiazem)\par   - Repeat CXR\par   - Increase supplemental O2 to 3L/min via nasal cannula\par  - Duonebs Q6hrs\par  - pulmonary evaluation \par -  CBC, BMP, Mag level\par  - Restart low dose furosemide once SBP improves

## 2019-08-28 NOTE — ASSESSMENT
[FreeTextEntry1] : 69 y.o. man with multiple medical comorbidities now with ongoing hypoxia and CALDERON

## 2019-08-28 NOTE — HISTORY OF PRESENT ILLNESS
[Post-hospitalization from ___ Hospital] : Post-hospitalization from [unfilled] Hospital [Admitted on: ___] : The patient was admitted on [unfilled] [Discharged on ___] : discharged on [unfilled] [Discharge Summary] : discharge summary [FreeTextEntry2] : 69 y.o. man with multiple medical comorbidities who presents for follow up. Patient is presently at [facility] re[facility] following admission for syncope. Patient had AICD implantation and recent re-admission for defibrillator firing. Presently, he reports ongoing SOB. Patient presents for follow up for his chronic medical conditions.

## 2019-08-28 NOTE — PHYSICAL EXAM
[Well Nourished] : well nourished [No Acute Distress] : no acute distress [Well Developed] : well developed [Well-Appearing] : well-appearing [No Respiratory Distress] : no respiratory distress  [No Accessory Muscle Use] : no accessory muscle use [Regular Rhythm] : with a regular rhythm [Normal Rate] : normal rate  [No Murmur] : no murmur heard [Normal S1, S2] : normal S1 and S2 [Speech Grossly Normal] : speech grossly normal [Memory Grossly Normal] : memory grossly normal [Alert and Oriented x3] : oriented to person, place, and time [Normal Affect] : the affect was normal [Normal Mood] : the mood was normal [de-identified] : Obese [Normal Insight/Judgement] : insight and judgment were intact [de-identified] : 1+ b/l LE pitting edema [de-identified] : Decrease air entry in b/l lung bases

## 2019-10-01 PROCEDURE — 82803 BLOOD GASES ANY COMBINATION: CPT

## 2019-10-01 PROCEDURE — C1769: CPT

## 2019-10-01 PROCEDURE — 94660 CPAP INITIATION&MGMT: CPT

## 2019-10-01 PROCEDURE — 71046 X-RAY EXAM CHEST 2 VIEWS: CPT

## 2019-10-01 PROCEDURE — C1732: CPT

## 2019-10-01 PROCEDURE — 71045 X-RAY EXAM CHEST 1 VIEW: CPT

## 2019-10-01 PROCEDURE — C1892: CPT

## 2019-10-01 PROCEDURE — 97116 GAIT TRAINING THERAPY: CPT

## 2019-10-01 PROCEDURE — 84484 ASSAY OF TROPONIN QUANT: CPT

## 2019-10-01 PROCEDURE — 83735 ASSAY OF MAGNESIUM: CPT

## 2019-10-01 PROCEDURE — 94640 AIRWAY INHALATION TREATMENT: CPT

## 2019-10-01 PROCEDURE — 85610 PROTHROMBIN TIME: CPT

## 2019-10-01 PROCEDURE — 93454 CORONARY ARTERY ANGIO S&I: CPT

## 2019-10-01 PROCEDURE — 80061 LIPID PANEL: CPT

## 2019-10-01 PROCEDURE — 86900 BLOOD TYPING SEROLOGIC ABO: CPT

## 2019-10-01 PROCEDURE — 82962 GLUCOSE BLOOD TEST: CPT

## 2019-10-01 PROCEDURE — 80053 COMPREHEN METABOLIC PANEL: CPT

## 2019-10-01 PROCEDURE — 85730 THROMBOPLASTIN TIME PARTIAL: CPT

## 2019-10-01 PROCEDURE — C1766: CPT

## 2019-10-01 PROCEDURE — 93623 PRGRMD STIMJ&PACG IV RX NFS: CPT

## 2019-10-01 PROCEDURE — C1759: CPT

## 2019-10-01 PROCEDURE — C1887: CPT

## 2019-10-01 PROCEDURE — 97161 PT EVAL LOW COMPLEX 20 MIN: CPT

## 2019-10-01 PROCEDURE — 80048 BASIC METABOLIC PNL TOTAL CA: CPT

## 2019-10-01 PROCEDURE — 86850 RBC ANTIBODY SCREEN: CPT

## 2019-10-01 PROCEDURE — 33249 INSJ/RPLCMT DEFIB W/LEAD(S): CPT

## 2019-10-01 PROCEDURE — C1722: CPT

## 2019-10-01 PROCEDURE — 82550 ASSAY OF CK (CPK): CPT

## 2019-10-01 PROCEDURE — 84100 ASSAY OF PHOSPHORUS: CPT

## 2019-10-01 PROCEDURE — 84443 ASSAY THYROID STIM HORMONE: CPT

## 2019-10-01 PROCEDURE — 97530 THERAPEUTIC ACTIVITIES: CPT

## 2019-10-01 PROCEDURE — 86901 BLOOD TYPING SEROLOGIC RH(D): CPT

## 2019-10-01 PROCEDURE — 93005 ELECTROCARDIOGRAM TRACING: CPT

## 2019-10-01 PROCEDURE — 82553 CREATINE MB FRACTION: CPT

## 2019-10-01 PROCEDURE — 85027 COMPLETE CBC AUTOMATED: CPT

## 2019-10-01 PROCEDURE — C8929: CPT

## 2019-10-01 PROCEDURE — 86803 HEPATITIS C AB TEST: CPT

## 2019-10-01 PROCEDURE — C1760: CPT

## 2019-10-01 PROCEDURE — 83036 HEMOGLOBIN GLYCOSYLATED A1C: CPT

## 2019-10-01 PROCEDURE — C1893: CPT

## 2019-10-01 PROCEDURE — C1894: CPT

## 2019-10-01 PROCEDURE — C1777: CPT

## 2019-10-01 PROCEDURE — C1730: CPT

## 2019-10-01 PROCEDURE — 93654 COMPRE EP EVAL TX VT: CPT

## 2019-10-01 PROCEDURE — 99152 MOD SED SAME PHYS/QHP 5/>YRS: CPT

## 2019-10-10 ENCOUNTER — APPOINTMENT (OUTPATIENT)
Dept: NUCLEAR MEDICINE | Facility: IMAGING CENTER | Age: 69
End: 2019-10-10

## 2019-11-18 ENCOUNTER — APPOINTMENT (OUTPATIENT)
Dept: INTERNAL MEDICINE | Facility: CLINIC | Age: 69
End: 2019-11-18
Payer: MEDICARE

## 2019-11-18 VITALS
SYSTOLIC BLOOD PRESSURE: 100 MMHG | BODY MASS INDEX: 36.84 KG/M2 | DIASTOLIC BLOOD PRESSURE: 64 MMHG | HEIGHT: 72 IN | OXYGEN SATURATION: 86 % | HEART RATE: 82 BPM | RESPIRATION RATE: 18 BRPM | TEMPERATURE: 97.4 F | WEIGHT: 272 LBS

## 2019-11-18 DIAGNOSIS — R60.0 LOCALIZED EDEMA: ICD-10-CM

## 2019-11-18 DIAGNOSIS — N40.0 BENIGN PROSTATIC HYPERPLASIA WITHOUT LOWER URINARY TRACT SYMPMS: ICD-10-CM

## 2019-11-18 DIAGNOSIS — I49.9 CARDIAC ARRHYTHMIA, UNSPECIFIED: ICD-10-CM

## 2019-11-18 DIAGNOSIS — Z80.0 FAMILY HISTORY OF MALIGNANT NEOPLASM OF DIGESTIVE ORGANS: ICD-10-CM

## 2019-11-18 DIAGNOSIS — R09.02 HYPOXEMIA: ICD-10-CM

## 2019-11-18 DIAGNOSIS — K21.9 GASTRO-ESOPHAGEAL REFLUX DISEASE W/OUT ESOPHAGITIS: ICD-10-CM

## 2019-11-18 DIAGNOSIS — Z87.891 PERSONAL HISTORY OF NICOTINE DEPENDENCE: ICD-10-CM

## 2019-11-18 PROCEDURE — 36415 COLL VENOUS BLD VENIPUNCTURE: CPT

## 2019-11-18 PROCEDURE — G0008: CPT

## 2019-11-18 PROCEDURE — 90662 IIV NO PRSV INCREASED AG IM: CPT

## 2019-11-18 PROCEDURE — 99214 OFFICE O/P EST MOD 30 MIN: CPT | Mod: 25

## 2019-11-18 RX ORDER — METOPROLOL TARTRATE 50 MG/1
50 TABLET, FILM COATED ORAL EVERY 8 HOURS
Refills: 0 | Status: DISCONTINUED | COMMUNITY
End: 2019-11-17

## 2019-11-18 RX ORDER — UMECLIDINIUM 62.5 UG/1
62.5 AEROSOL, POWDER ORAL
Refills: 0 | Status: ACTIVE | COMMUNITY

## 2019-11-18 RX ORDER — TIOTROPIUM BROMIDE 18 UG/1
18 CAPSULE ORAL; RESPIRATORY (INHALATION) DAILY
Refills: 0 | Status: DISCONTINUED | COMMUNITY
End: 2019-11-17

## 2019-11-18 RX ORDER — PRAVASTATIN SODIUM 40 MG/1
40 TABLET ORAL
Qty: 90 | Refills: 0 | Status: DISCONTINUED | COMMUNITY
Start: 2019-01-09 | End: 2019-11-17

## 2019-11-18 RX ORDER — FLUTICASONE PROPIONATE AND SALMETEROL 50; 500 UG/1; UG/1
500-50 POWDER RESPIRATORY (INHALATION) TWICE DAILY
Refills: 0 | Status: DISCONTINUED | COMMUNITY
End: 2019-11-17

## 2019-11-18 RX ORDER — FLUTICASONE FUROATE AND VILANTEROL TRIFENATATE 100; 25 UG/1; UG/1
100-25 POWDER RESPIRATORY (INHALATION)
Refills: 0 | Status: ACTIVE | COMMUNITY

## 2019-11-18 NOTE — HISTORY OF PRESENT ILLNESS
[FreeTextEntry1] : Follow up for chronic medical conditions  [de-identified] : Here for f/u after being in rehab for about 3 months. As per spouse, patient has been doing better since rehab stay. Spouse also concerned about when to give metoprolol; she was told to hold the medication when the systolic measurement is below 90. Patient has no other concerns presently.

## 2019-11-18 NOTE — HEALTH RISK ASSESSMENT
[Any fall with injury in past year] : Patient reported fall with injury in the past year [No] : In the past 12 months have you used drugs other than those required for medical reasons? No [0] : 1) Little interest or pleasure doing things: Not at all (0) [] : No [de-identified] : cardiologist  [Audit-CScore] : 0 [de-identified] : none [de-identified] : none [[id number]] : 0

## 2019-11-18 NOTE — COUNSELING
[Potential consequences of obesity discussed] : Potential consequences of obesity discussed [Benefits of weight loss discussed] : Benefits of weight loss discussed [None] : None [Good understanding] : Patient has a good understanding of lifestyle changes and steps needed to achieve self management goal

## 2019-11-18 NOTE — PLAN
[FreeTextEntry1] :  - Continue with all current treatments. \par  - Labs done in office \par - Further management pending lab results

## 2019-11-18 NOTE — PHYSICAL EXAM
[No Acute Distress] : no acute distress [Well Nourished] : well nourished [Well Developed] : well developed [Well-Appearing] : well-appearing [de-identified] : On supplemental oxygen

## 2019-12-09 LAB
ALBUMIN SERPL ELPH-MCNC: 4 G/DL
ALP BLD-CCNC: 138 U/L
ALT SERPL-CCNC: 13 U/L
ANION GAP SERPL CALC-SCNC: 14 MMOL/L
AST SERPL-CCNC: 14 U/L
BASOPHILS # BLD AUTO: 0.04 K/UL
BASOPHILS NFR BLD AUTO: 0.4 %
BILIRUB SERPL-MCNC: 0.2 MG/DL
BUN SERPL-MCNC: 26 MG/DL
CALCIUM SERPL-MCNC: 9.1 MG/DL
CHLORIDE SERPL-SCNC: 97 MMOL/L
CHOLEST SERPL-MCNC: 132 MG/DL
CHOLEST/HDLC SERPL: 2.4 RATIO
CO2 SERPL-SCNC: 28 MMOL/L
CREAT SERPL-MCNC: 1.37 MG/DL
EOSINOPHIL # BLD AUTO: 0.3 K/UL
EOSINOPHIL NFR BLD AUTO: 3.3 %
ESTIMATED AVERAGE GLUCOSE: 108 MG/DL
GLUCOSE SERPL-MCNC: 96 MG/DL
HBA1C MFR BLD HPLC: 5.4 %
HCT VFR BLD CALC: 41.7 %
HDLC SERPL-MCNC: 56 MG/DL
HGB BLD-MCNC: 12.3 G/DL
IMM GRANULOCYTES NFR BLD AUTO: 0.6 %
LDLC SERPL CALC-MCNC: 55 MG/DL
LYMPHOCYTES # BLD AUTO: 1.69 K/UL
LYMPHOCYTES NFR BLD AUTO: 18.8 %
MAN DIFF?: NORMAL
MCHC RBC-ENTMCNC: 24.9 PG
MCHC RBC-ENTMCNC: 29.5 GM/DL
MCV RBC AUTO: 84.4 FL
MONOCYTES # BLD AUTO: 0.67 K/UL
MONOCYTES NFR BLD AUTO: 7.5 %
NEUTROPHILS # BLD AUTO: 6.24 K/UL
NEUTROPHILS NFR BLD AUTO: 69.4 %
NT-PROBNP SERPL-MCNC: 928 PG/ML
PLATELET # BLD AUTO: 343 K/UL
POTASSIUM SERPL-SCNC: 4.9 MMOL/L
PROT SERPL-MCNC: 7.4 G/DL
RBC # BLD: 4.94 M/UL
RBC # FLD: 14.5 %
SODIUM SERPL-SCNC: 139 MMOL/L
T3FREE SERPL-MCNC: 2.47 PG/ML
T4 FREE SERPL-MCNC: 1.3 NG/DL
TRIGL SERPL-MCNC: 103 MG/DL
TSH SERPL-ACNC: 6.56 UIU/ML
WBC # FLD AUTO: 8.99 K/UL

## 2020-01-01 ENCOUNTER — APPOINTMENT (OUTPATIENT)
Dept: ELECTROPHYSIOLOGY | Facility: CLINIC | Age: 70
End: 2020-01-01
Payer: MEDICARE

## 2020-01-01 ENCOUNTER — RX RENEWAL (OUTPATIENT)
Age: 70
End: 2020-01-01

## 2020-01-01 ENCOUNTER — APPOINTMENT (OUTPATIENT)
Dept: INTERNAL MEDICINE | Facility: CLINIC | Age: 70
End: 2020-01-01
Payer: MEDICARE

## 2020-01-01 ENCOUNTER — APPOINTMENT (OUTPATIENT)
Dept: INTERNAL MEDICINE | Facility: CLINIC | Age: 70
End: 2020-01-01

## 2020-01-01 VITALS
RESPIRATION RATE: 18 BRPM | SYSTOLIC BLOOD PRESSURE: 118 MMHG | HEART RATE: 62 BPM | DIASTOLIC BLOOD PRESSURE: 80 MMHG | TEMPERATURE: 97.8 F | HEIGHT: 72 IN | OXYGEN SATURATION: 91 %

## 2020-01-01 VITALS — OXYGEN SATURATION: 98 % | DIASTOLIC BLOOD PRESSURE: 67 MMHG | SYSTOLIC BLOOD PRESSURE: 95 MMHG | HEART RATE: 63 BPM

## 2020-01-01 DIAGNOSIS — E66.01 MORBID (SEVERE) OBESITY DUE TO EXCESS CALORIES: ICD-10-CM

## 2020-01-01 DIAGNOSIS — E03.9 HYPOTHYROIDISM, UNSPECIFIED: ICD-10-CM

## 2020-01-01 DIAGNOSIS — Z92.29 PERSONAL HISTORY OF OTHER DRUG THERAPY: ICD-10-CM

## 2020-01-01 DIAGNOSIS — E87.6 HYPOKALEMIA: ICD-10-CM

## 2020-01-01 LAB
ALBUMIN SERPL ELPH-MCNC: 4.1 G/DL
ALP BLD-CCNC: 107 U/L
ALT SERPL-CCNC: 12 U/L
ANION GAP SERPL CALC-SCNC: 14 MMOL/L
AST SERPL-CCNC: 20 U/L
BASOPHILS # BLD AUTO: 0.03 K/UL
BASOPHILS NFR BLD AUTO: 0.4 %
BILIRUB SERPL-MCNC: 0.3 MG/DL
BUN SERPL-MCNC: 25 MG/DL
CALCIUM SERPL-MCNC: 9.1 MG/DL
CHLORIDE SERPL-SCNC: 98 MMOL/L
CHOLEST SERPL-MCNC: 130 MG/DL
CO2 SERPL-SCNC: 27 MMOL/L
CREAT SERPL-MCNC: 1.29 MG/DL
EOSINOPHIL # BLD AUTO: 0.23 K/UL
EOSINOPHIL NFR BLD AUTO: 3 %
ESTIMATED AVERAGE GLUCOSE: 111 MG/DL
GLUCOSE SERPL-MCNC: 75 MG/DL
HBA1C MFR BLD HPLC: 5.5 %
HCT VFR BLD CALC: 42.8 %
HDLC SERPL-MCNC: 56 MG/DL
HGB BLD-MCNC: 12.9 G/DL
IMM GRANULOCYTES NFR BLD AUTO: 0.3 %
LDLC SERPL CALC-MCNC: 54 MG/DL
LYMPHOCYTES # BLD AUTO: 1.29 K/UL
LYMPHOCYTES NFR BLD AUTO: 17 %
MAGNESIUM SERPL-MCNC: 2.3 MG/DL
MAN DIFF?: NORMAL
MCHC RBC-ENTMCNC: 26.6 PG
MCHC RBC-ENTMCNC: 30.1 GM/DL
MCV RBC AUTO: 88.2 FL
MONOCYTES # BLD AUTO: 0.73 K/UL
MONOCYTES NFR BLD AUTO: 9.6 %
NEUTROPHILS # BLD AUTO: 5.29 K/UL
NEUTROPHILS NFR BLD AUTO: 69.7 %
NONHDLC SERPL-MCNC: 74 MG/DL
PLATELET # BLD AUTO: 303 K/UL
POTASSIUM SERPL-SCNC: 4.6 MMOL/L
PROT SERPL-MCNC: 6.9 G/DL
RBC # BLD: 4.85 M/UL
RBC # FLD: 15 %
SODIUM SERPL-SCNC: 139 MMOL/L
T3FREE SERPL-MCNC: 2.02 PG/ML
T4 FREE SERPL-MCNC: 1.4 NG/DL
TRIGL SERPL-MCNC: 104 MG/DL
TSH SERPL-ACNC: 3.27 UIU/ML
WBC # FLD AUTO: 7.59 K/UL

## 2020-01-01 PROCEDURE — 93295 DEV INTERROG REMOTE 1/2/MLT: CPT

## 2020-01-01 PROCEDURE — 93296 REM INTERROG EVL PM/IDS: CPT

## 2020-01-01 PROCEDURE — 99214 OFFICE O/P EST MOD 30 MIN: CPT | Mod: 25

## 2020-01-01 PROCEDURE — 99213 OFFICE O/P EST LOW 20 MIN: CPT | Mod: 95

## 2020-01-01 PROCEDURE — 36415 COLL VENOUS BLD VENIPUNCTURE: CPT

## 2020-01-01 RX ORDER — METOPROLOL SUCCINATE 200 MG/1
200 TABLET, EXTENDED RELEASE ORAL
Refills: 0 | Status: DISCONTINUED | COMMUNITY
End: 2020-01-01

## 2020-01-01 RX ORDER — METOPROLOL SUCCINATE 50 MG/1
50 TABLET, EXTENDED RELEASE ORAL
Qty: 90 | Refills: 0 | Status: ACTIVE | COMMUNITY
Start: 2020-01-01

## 2020-01-01 RX ORDER — ECONAZOLE NITRATE 10 MG/G
1 CREAM TOPICAL
Qty: 15 | Refills: 0 | Status: ACTIVE | COMMUNITY
Start: 2019-12-30

## 2020-01-01 RX ORDER — FINASTERIDE 5 MG/1
5 TABLET, FILM COATED ORAL
Qty: 90 | Refills: 1 | Status: ACTIVE | COMMUNITY
Start: 2020-01-01 | End: 1900-01-01

## 2020-01-01 RX ORDER — TAMSULOSIN HYDROCHLORIDE 0.4 MG/1
0.4 CAPSULE ORAL
Qty: 90 | Refills: 3 | Status: ACTIVE | COMMUNITY
Start: 1900-01-01 | End: 1900-01-01

## 2020-01-01 RX ORDER — POTASSIUM CHLORIDE 750 MG/1
10 TABLET, FILM COATED, EXTENDED RELEASE ORAL DAILY
Qty: 90 | Refills: 1 | Status: ACTIVE | COMMUNITY
Start: 1900-01-01 | End: 1900-01-01

## 2020-01-01 RX ORDER — METOPROLOL SUCCINATE 100 MG/1
100 TABLET, EXTENDED RELEASE ORAL
Qty: 180 | Refills: 0 | Status: ACTIVE | COMMUNITY
Start: 2020-01-01

## 2020-01-06 ENCOUNTER — APPOINTMENT (OUTPATIENT)
Dept: INTERNAL MEDICINE | Facility: CLINIC | Age: 70
End: 2020-01-06
Payer: MEDICARE

## 2020-01-06 ENCOUNTER — LABORATORY RESULT (OUTPATIENT)
Age: 70
End: 2020-01-06

## 2020-01-06 VITALS
BODY MASS INDEX: 36.98 KG/M2 | OXYGEN SATURATION: 89 % | SYSTOLIC BLOOD PRESSURE: 94 MMHG | HEIGHT: 72 IN | RESPIRATION RATE: 18 BRPM | DIASTOLIC BLOOD PRESSURE: 66 MMHG | HEART RATE: 64 BPM | WEIGHT: 273 LBS | TEMPERATURE: 97.6 F

## 2020-01-06 DIAGNOSIS — J44.9 CHRONIC OBSTRUCTIVE PULMONARY DISEASE, UNSPECIFIED: ICD-10-CM

## 2020-01-06 PROCEDURE — 99214 OFFICE O/P EST MOD 30 MIN: CPT | Mod: 25

## 2020-01-06 PROCEDURE — 36415 COLL VENOUS BLD VENIPUNCTURE: CPT

## 2020-01-06 RX ORDER — PRAVASTATIN SODIUM 20 MG/1
20 TABLET ORAL
Qty: 90 | Refills: 1 | Status: ACTIVE | COMMUNITY
Start: 1900-01-01 | End: 1900-01-01

## 2020-01-06 NOTE — PHYSICAL EXAM
[Well Nourished] : well nourished [No Acute Distress] : no acute distress [Well Developed] : well developed [Well-Appearing] : well-appearing [No Lymphadenopathy] : no lymphadenopathy [No Respiratory Distress] : no respiratory distress  [Clear to Auscultation] : lungs were clear to auscultation bilaterally [No Accessory Muscle Use] : no accessory muscle use [Regular Rhythm] : with a regular rhythm [Normal Rate] : normal rate  [Normal S1, S2] : normal S1 and S2 [No Murmur] : no murmur heard [Speech Grossly Normal] : speech grossly normal [Memory Grossly Normal] : memory grossly normal [Soft] : abdomen soft [Normal Affect] : the affect was normal [Alert and Oriented x3] : oriented to person, place, and time [Normal Mood] : the mood was normal [Normal Insight/Judgement] : insight and judgment were intact [de-identified] : On supplemental oxygen  [de-identified] : Distended

## 2020-01-06 NOTE — HISTORY OF PRESENT ILLNESS
[FreeTextEntry1] : Follow up for chronic medical conditions  [de-identified] : Patient presents for follow up for his chronic medical conditions. He reports compliance with all prescribed medical therapy and dietary restrictions. No complaints at present.

## 2020-01-06 NOTE — HEALTH RISK ASSESSMENT
[No] : No [Any fall with injury in past year] : Patient reported fall with injury in the past year [0] : 2) Feeling down, depressed, or hopeless: Not at all (0) [] : No [Audit-CScore] : 0 [de-identified] : None [de-identified] : Cardiologist [[id number]] : 0

## 2020-01-15 DIAGNOSIS — R79.89 OTHER SPECIFIED ABNORMAL FINDINGS OF BLOOD CHEMISTRY: ICD-10-CM

## 2020-01-15 LAB
ALBUMIN SERPL ELPH-MCNC: 3.9 G/DL
ALP BLD-CCNC: 130 U/L
ALT SERPL-CCNC: 14 U/L
ANION GAP SERPL CALC-SCNC: 15 MMOL/L
AST SERPL-CCNC: 14 U/L
BASOPHILS # BLD AUTO: 0.04 K/UL
BASOPHILS NFR BLD AUTO: 0.5 %
BILIRUB SERPL-MCNC: 0.3 MG/DL
BUN SERPL-MCNC: 25 MG/DL
CALCIUM SERPL-MCNC: 8.9 MG/DL
CHLORIDE SERPL-SCNC: 96 MMOL/L
CHOLEST SERPL-MCNC: 128 MG/DL
CHOLEST/HDLC SERPL: 2.1 RATIO
CO2 SERPL-SCNC: 27 MMOL/L
CREAT SERPL-MCNC: 1.23 MG/DL
EOSINOPHIL # BLD AUTO: 0.21 K/UL
EOSINOPHIL NFR BLD AUTO: 2.6 %
GLUCOSE SERPL-MCNC: 80 MG/DL
HCT VFR BLD CALC: 42.6 %
HDLC SERPL-MCNC: 61 MG/DL
HGB BLD-MCNC: 12.5 G/DL
IMM GRANULOCYTES NFR BLD AUTO: 0.4 %
LDLC SERPL CALC-MCNC: 45 MG/DL
LYMPHOCYTES # BLD AUTO: 1.7 K/UL
LYMPHOCYTES NFR BLD AUTO: 21.1 %
MAN DIFF?: NORMAL
MCHC RBC-ENTMCNC: 24.7 PG
MCHC RBC-ENTMCNC: 29.3 GM/DL
MCV RBC AUTO: 84 FL
MONOCYTES # BLD AUTO: 0.65 K/UL
MONOCYTES NFR BLD AUTO: 8.1 %
NEUTROPHILS # BLD AUTO: 5.44 K/UL
NEUTROPHILS NFR BLD AUTO: 67.3 %
PLATELET # BLD AUTO: 343 K/UL
POTASSIUM SERPL-SCNC: 4.8 MMOL/L
PROT SERPL-MCNC: 7.4 G/DL
RBC # BLD: 5.07 M/UL
RBC # FLD: 15 %
SODIUM SERPL-SCNC: 138 MMOL/L
TRIGL SERPL-MCNC: 110 MG/DL
TSH SERPL-ACNC: 6.3 UIU/ML
WBC # FLD AUTO: 8.07 K/UL

## 2020-01-20 NOTE — PATIENT PROFILE ADULT - HOW PATIENT ADDRESSED, PROFILE
Feeling well.  Baby is active. Denies any leaking of fluid, vaginal bleeding, regular uterine contractions, or headaches or other concerns.  Discussed low lying placenta and the need for a follow up US in 6-8 weeks.    Discussed GCT at 24 weeks.    Reviewed to call [phone] for contractions, loss of fluid, vaginal bleeding, decreased fetal movement or any other questions or concerns.    RTC in 4 weeks.  Raquel Moon, ERIC, APRN, CNM                Jose Guadalupe

## 2020-01-22 ENCOUNTER — APPOINTMENT (OUTPATIENT)
Dept: ELECTROPHYSIOLOGY | Facility: CLINIC | Age: 70
End: 2020-01-22
Payer: MEDICARE

## 2020-01-22 PROCEDURE — 93296 REM INTERROG EVL PM/IDS: CPT

## 2020-01-22 PROCEDURE — 93295 DEV INTERROG REMOTE 1/2/MLT: CPT

## 2020-01-27 DIAGNOSIS — K59.09 OTHER CONSTIPATION: ICD-10-CM

## 2020-01-27 RX ORDER — SENNOSIDES/DOCUSATE SODIUM 8.6MG-50MG
8.6-5 TABLET ORAL DAILY
Qty: 180 | Refills: 3 | Status: ACTIVE | COMMUNITY
Start: 1900-01-01 | End: 1900-01-01

## 2020-01-29 RX ORDER — MAGNESIUM OXIDE 241.3 MG/1000MG
400 TABLET ORAL DAILY
Qty: 90 | Refills: 3 | Status: ACTIVE | COMMUNITY
Start: 2020-01-29 | End: 1900-01-01

## 2020-01-29 RX ORDER — CYANOCOBALAMIN (VITAMIN B-12) 2000 MCG
2000 TABLET ORAL DAILY
Qty: 90 | Refills: 3 | Status: ACTIVE | COMMUNITY
Start: 1900-01-01 | End: 1900-01-01

## 2020-03-19 ENCOUNTER — APPOINTMENT (OUTPATIENT)
Dept: INTERNAL MEDICINE | Facility: CLINIC | Age: 70
End: 2020-03-19

## 2020-07-02 PROBLEM — Z92.29 HISTORY OF INFLUENZA VACCINATION: Status: RESOLVED | Noted: 2019-11-18 | Resolved: 2020-01-01

## 2020-07-02 NOTE — HISTORY OF PRESENT ILLNESS
[Medical Office: ([facility])___] : at the medical office located in  [Home] : at home, [unfilled] , at the time of the visit. [Spouse] : spouse [Verbal consent obtained from patient] : the patient, [unfilled] [Family Member] : family member [FreeTextEntry1] : Follow up for chronic medical conditions  [de-identified] : Patient presents for follow up for his chronic medical conditions. He reports compliance with all prescribed medical therapy and dietary restrictions. No complaints at present.

## 2020-07-02 NOTE — HEALTH RISK ASSESSMENT
[No] : In the past 12 months have you used drugs other than those required for medical reasons? No [Any fall with injury in past year] : Patient reported fall with injury in the past year [Assistive Device] : Patient uses an assistive device [0] : 2) Feeling down, depressed, or hopeless: Not at all (0) [] : No [de-identified] : Cardiologist [de-identified] : Regular diet [de-identified] : None [Audit-CScore] : 0 [de-identified] : Galileo [[id number]] : 0

## 2020-11-19 PROBLEM — E66.01 CLASS 2 SEVERE OBESITY DUE TO EXCESS CALORIES WITH SERIOUS COMORBIDITY AND BODY MASS INDEX (BMI) OF 39.0 TO 39.9 IN ADULT: Status: ACTIVE | Noted: 2018-09-26

## 2020-11-19 PROBLEM — E03.9 ADULT HYPOTHYROIDISM: Status: ACTIVE | Noted: 2020-01-15

## 2020-11-19 PROBLEM — E87.6 HYPOKALEMIA: Status: ACTIVE | Noted: 2019-11-18

## 2020-11-19 NOTE — HEALTH RISK ASSESSMENT
[No] : In the past 12 months have you used drugs other than those required for medical reasons? No [No falls in past year] : Patient reported no falls in the past year [Assistive Device] : Patient uses an assistive device [0] : 2) Feeling down, depressed, or hopeless: Not at all (0) [] : No [de-identified] : Cardiologist  [de-identified] : None [de-identified] : Regular [de-identified] : [employer] [[id number]] : 0

## 2020-11-19 NOTE — PHYSICAL EXAM
[No Acute Distress] : no acute distress [Well Nourished] : well nourished [Well Developed] : well developed [Well-Appearing] : well-appearing [Normal Sclera/Conjunctiva] : normal sclera/conjunctiva [No Respiratory Distress] : no respiratory distress  [No Accessory Muscle Use] : no accessory muscle use [Clear to Auscultation] : lungs were clear to auscultation bilaterally [Normal Rate] : normal rate  [Normal S1, S2] : normal S1 and S2 [No Murmur] : no murmur heard [Soft] : abdomen soft [Speech Grossly Normal] : speech grossly normal [Memory Grossly Normal] : memory grossly normal [Normal Affect] : the affect was normal [Alert and Oriented x3] : oriented to person, place, and time [Normal Mood] : the mood was normal [Normal Insight/Judgement] : insight and judgment were intact [de-identified] : Obese [de-identified] : Patient wearing protective face mask  [de-identified] : Irregular rhythm

## 2020-11-19 NOTE — HISTORY OF PRESENT ILLNESS
[Spouse] : spouse [FreeTextEntry1] : Follow up for chronic medical conditions  [de-identified] : Patient presents for follow up for his chronic medical conditions. He reports compliance with all prescribed medical therapy and dietary restrictions. No complaints at present.

## 2021-01-01 ENCOUNTER — APPOINTMENT (OUTPATIENT)
Dept: INTERNAL MEDICINE | Facility: CLINIC | Age: 71
End: 2021-01-01

## 2021-01-01 ENCOUNTER — APPOINTMENT (OUTPATIENT)
Dept: INTERNAL MEDICINE | Facility: CLINIC | Age: 71
End: 2021-01-01
Payer: MEDICARE

## 2021-01-01 ENCOUNTER — APPOINTMENT (OUTPATIENT)
Dept: ELECTROPHYSIOLOGY | Facility: CLINIC | Age: 71
End: 2021-01-01
Payer: MEDICARE

## 2021-01-01 ENCOUNTER — NON-APPOINTMENT (OUTPATIENT)
Age: 71
End: 2021-01-01

## 2021-01-01 VITALS — OXYGEN SATURATION: 93 % | HEART RATE: 68 BPM | DIASTOLIC BLOOD PRESSURE: 63 MMHG | SYSTOLIC BLOOD PRESSURE: 100 MMHG

## 2021-01-01 DIAGNOSIS — Z00.00 ENCOUNTER FOR GENERAL ADULT MEDICAL EXAMINATION W/OUT ABNORMAL FINDINGS: ICD-10-CM

## 2021-01-01 DIAGNOSIS — E78.5 HYPERLIPIDEMIA, UNSPECIFIED: ICD-10-CM

## 2021-01-01 DIAGNOSIS — I10 ESSENTIAL (PRIMARY) HYPERTENSION: ICD-10-CM

## 2021-01-01 DIAGNOSIS — I48.20 CHRONIC ATRIAL FIBRILLATION, UNSP: ICD-10-CM

## 2021-01-01 PROCEDURE — 93295 DEV INTERROG REMOTE 1/2/MLT: CPT

## 2021-01-01 PROCEDURE — ZZZZZ: CPT

## 2021-01-01 PROCEDURE — 93296 REM INTERROG EVL PM/IDS: CPT

## 2021-01-01 PROCEDURE — 99495 TRANSJ CARE MGMT MOD F2F 14D: CPT | Mod: 95

## 2021-01-01 RX ORDER — POTASSIUM CHLORIDE 750 MG/1
10 TABLET, EXTENDED RELEASE ORAL
Qty: 90 | Refills: 1 | Status: ACTIVE | COMMUNITY
Start: 2020-01-12 | End: 1900-01-01

## 2021-01-01 RX ORDER — FOLIC ACID 1 MG/1
1 TABLET ORAL DAILY
Qty: 90 | Refills: 3 | Status: ACTIVE | COMMUNITY
Start: 1900-01-01 | End: 1900-01-01

## 2021-01-01 RX ORDER — ESOMEPRAZOLE MAGNESIUM 40 MG/1
40 CAPSULE, DELAYED RELEASE ORAL
Qty: 90 | Refills: 1 | Status: ACTIVE | COMMUNITY
Start: 2018-01-31 | End: 1900-01-01

## 2021-01-01 RX ORDER — FUROSEMIDE 40 MG/1
40 TABLET ORAL
Qty: 90 | Refills: 1 | Status: ACTIVE | COMMUNITY
Start: 1900-01-01 | End: 1900-01-01

## 2021-01-01 RX ORDER — APIXABAN 5 MG/1
5 TABLET, FILM COATED ORAL
Qty: 60 | Refills: 2 | Status: ACTIVE | COMMUNITY
Start: 2019-06-26 | End: 1900-01-01

## 2021-01-01 RX ORDER — LEVOTHYROXINE SODIUM 0.03 MG/1
25 TABLET ORAL
Qty: 90 | Refills: 1 | Status: ACTIVE | COMMUNITY
Start: 2020-01-15 | End: 1900-01-01

## 2021-02-02 PROBLEM — E78.5 HLD (HYPERLIPIDEMIA): Status: ACTIVE | Noted: 2019-04-23

## 2021-02-02 PROBLEM — I48.20 CHRONIC ATRIAL FIBRILLATION: Status: ACTIVE | Noted: 2019-06-26

## 2021-02-02 NOTE — HISTORY OF PRESENT ILLNESS
[Post-hospitalization from ___ Hospital] : Post-hospitalization from [unfilled] Hospital [Admitted on: ___] : The patient was admitted on [unfilled] [Discharged on ___] : discharged on [unfilled] [Patient Contacted By: ____] : and contacted by [unfilled] [Home] : at home, [unfilled] , at the time of the visit. [Medical Office: ([facility])___] : at the medical office located in  [Spouse] : spouse [Verbal consent obtained from patient] : the patient, [unfilled] [FreeTextEntry2] : Patient was admitted at [facility] for evaluation of syncope. Patient was given naracan by EMS and patient regained consciousness en route to the ER. Patient was discharge after too days with no significant findings on work up. Presently, he reports feeling well.

## 2021-02-02 NOTE — HEALTH RISK ASSESSMENT
[Intercurrent ED visits] : went to ED [Intercurrent hospitalizations] : was admitted to the hospital

## 2021-02-02 NOTE — PHYSICAL EXAM
[No Acute Distress] : no acute distress [Well Nourished] : well nourished [Well Developed] : well developed [Well-Appearing] : well-appearing [Normal Sclera/Conjunctiva] : normal sclera/conjunctiva [EOMI] : extraocular movements intact [No Respiratory Distress] : no respiratory distress  [de-identified] : Nasal cannula in place

## 2021-02-02 NOTE — CURRENT MEDS
[Takes medication as prescribed] : takes [None] : Patient does not have any barriers to medication adherence [Yes] : Reviewed medication list for presence of high-risk medications. [Blood Thinners] : blood thinners

## 2021-04-21 ENCOUNTER — APPOINTMENT (OUTPATIENT)
Dept: ELECTROPHYSIOLOGY | Facility: CLINIC | Age: 71
End: 2021-04-21

## 2021-08-19 ENCOUNTER — FORM ENCOUNTER (OUTPATIENT)
Age: 71
End: 2021-08-19

## 2021-10-06 PROBLEM — I10 ESSENTIAL HYPERTENSION: Status: ACTIVE | Noted: 2017-12-09

## 2021-10-17 ENCOUNTER — FORM ENCOUNTER (OUTPATIENT)
Age: 71
End: 2021-10-17

## 2021-11-05 ENCOUNTER — APPOINTMENT (OUTPATIENT)
Dept: ELECTROPHYSIOLOGY | Facility: CLINIC | Age: 71
End: 2021-11-05

## 2025-01-23 NOTE — DISCHARGE NOTE NURSING/CASE MANAGEMENT/SOCIAL WORK - NSDCDPATPORTLINK_GEN_ALL_CORE
No You can access the [facility] Portal, offered by [facility], by registering with the following website: [email or web address]